# Patient Record
Sex: FEMALE | Race: WHITE | NOT HISPANIC OR LATINO | Employment: PART TIME | URBAN - METROPOLITAN AREA
[De-identification: names, ages, dates, MRNs, and addresses within clinical notes are randomized per-mention and may not be internally consistent; named-entity substitution may affect disease eponyms.]

---

## 2022-12-29 ENCOUNTER — TRANSFERRED RECORDS (OUTPATIENT)
Dept: MULTI SPECIALTY CLINIC | Facility: CLINIC | Age: 32
End: 2022-12-29

## 2022-12-29 LAB
HEP C HIM: NORMAL
HIV 1&2 EXT: NORMAL

## 2023-03-26 ENCOUNTER — TRANSFERRED RECORDS (OUTPATIENT)
Dept: MULTI SPECIALTY CLINIC | Facility: CLINIC | Age: 33
End: 2023-03-26

## 2023-03-26 LAB — PAP SMEAR - HIM PATIENT REPORTED: NORMAL

## 2023-12-21 ENCOUNTER — MEDICAL CORRESPONDENCE (OUTPATIENT)
Dept: HEALTH INFORMATION MANAGEMENT | Facility: CLINIC | Age: 33
End: 2023-12-21
Payer: COMMERCIAL

## 2023-12-31 ENCOUNTER — HEALTH MAINTENANCE LETTER (OUTPATIENT)
Age: 33
End: 2023-12-31

## 2024-01-22 ENCOUNTER — PATIENT OUTREACH (OUTPATIENT)
Dept: ONCOLOGY | Facility: CLINIC | Age: 34
End: 2024-01-22

## 2024-01-22 ENCOUNTER — OFFICE VISIT (OUTPATIENT)
Dept: FAMILY MEDICINE | Facility: CLINIC | Age: 34
End: 2024-01-22
Payer: COMMERCIAL

## 2024-01-22 VITALS
DIASTOLIC BLOOD PRESSURE: 75 MMHG | TEMPERATURE: 97.9 F | RESPIRATION RATE: 16 BRPM | WEIGHT: 237.25 LBS | OXYGEN SATURATION: 97 % | SYSTOLIC BLOOD PRESSURE: 113 MMHG | HEART RATE: 81 BPM | BODY MASS INDEX: 37.24 KG/M2 | HEIGHT: 67 IN

## 2024-01-22 DIAGNOSIS — K21.9 GASTROESOPHAGEAL REFLUX DISEASE, UNSPECIFIED WHETHER ESOPHAGITIS PRESENT: ICD-10-CM

## 2024-01-22 DIAGNOSIS — L65.9 HAIR LOSS: ICD-10-CM

## 2024-01-22 DIAGNOSIS — Z87.59 HISTORY OF PRE-ECLAMPSIA: ICD-10-CM

## 2024-01-22 DIAGNOSIS — Z86.32 HISTORY OF GESTATIONAL DIABETES MELLITUS (GDM): ICD-10-CM

## 2024-01-22 DIAGNOSIS — E66.01 CLASS 2 SEVERE OBESITY DUE TO EXCESS CALORIES WITH SERIOUS COMORBIDITY AND BODY MASS INDEX (BMI) OF 36.0 TO 36.9 IN ADULT (H): ICD-10-CM

## 2024-01-22 DIAGNOSIS — E66.812 CLASS 2 SEVERE OBESITY DUE TO EXCESS CALORIES WITH SERIOUS COMORBIDITY AND BODY MASS INDEX (BMI) OF 36.0 TO 36.9 IN ADULT (H): ICD-10-CM

## 2024-01-22 DIAGNOSIS — Z13.220 SCREENING FOR LIPID DISORDERS: ICD-10-CM

## 2024-01-22 DIAGNOSIS — N83.8 DIMINISHED OVARIAN RESERVE: ICD-10-CM

## 2024-01-22 DIAGNOSIS — Z00.00 ROUTINE GENERAL MEDICAL EXAMINATION AT A HEALTH CARE FACILITY: Primary | ICD-10-CM

## 2024-01-22 DIAGNOSIS — Z80.3 FAMILY HISTORY OF MALIGNANT NEOPLASM OF BREAST: ICD-10-CM

## 2024-01-22 DIAGNOSIS — F33.0 MAJOR DEPRESSIVE DISORDER, RECURRENT EPISODE, MILD (H): ICD-10-CM

## 2024-01-22 LAB
CHOLEST SERPL-MCNC: 212 MG/DL
ERYTHROCYTE [DISTWIDTH] IN BLOOD BY AUTOMATED COUNT: 13.9 % (ref 10–15)
FASTING STATUS PATIENT QL REPORTED: NO
HCT VFR BLD AUTO: 40.3 % (ref 35–47)
HDLC SERPL-MCNC: 44 MG/DL
HGB BLD-MCNC: 13.3 G/DL (ref 11.7–15.7)
HOLD SPECIMEN: NORMAL
LDLC SERPL CALC-MCNC: 117 MG/DL
MCH RBC QN AUTO: 28.9 PG (ref 26.5–33)
MCHC RBC AUTO-ENTMCNC: 33 G/DL (ref 31.5–36.5)
MCV RBC AUTO: 88 FL (ref 78–100)
NONHDLC SERPL-MCNC: 168 MG/DL
PLATELET # BLD AUTO: 255 10E3/UL (ref 150–450)
RBC # BLD AUTO: 4.6 10E6/UL (ref 3.8–5.2)
TRIGL SERPL-MCNC: 256 MG/DL
TSH SERPL DL<=0.005 MIU/L-ACNC: 1.81 UIU/ML (ref 0.3–4.2)
WBC # BLD AUTO: 6.3 10E3/UL (ref 4–11)

## 2024-01-22 PROCEDURE — 84443 ASSAY THYROID STIM HORMONE: CPT | Performed by: FAMILY MEDICINE

## 2024-01-22 PROCEDURE — 80061 LIPID PANEL: CPT | Performed by: FAMILY MEDICINE

## 2024-01-22 PROCEDURE — 85027 COMPLETE CBC AUTOMATED: CPT | Performed by: FAMILY MEDICINE

## 2024-01-22 PROCEDURE — 36415 COLL VENOUS BLD VENIPUNCTURE: CPT | Performed by: FAMILY MEDICINE

## 2024-01-22 PROCEDURE — 99385 PREV VISIT NEW AGE 18-39: CPT | Performed by: FAMILY MEDICINE

## 2024-01-22 PROCEDURE — 99213 OFFICE O/P EST LOW 20 MIN: CPT | Mod: 25 | Performed by: FAMILY MEDICINE

## 2024-01-22 RX ORDER — FAMOTIDINE 20 MG/1
20 TABLET, FILM COATED ORAL 2 TIMES DAILY PRN
Qty: 60 TABLET | Refills: 1 | Status: SHIPPED | OUTPATIENT
Start: 2024-01-22 | End: 2024-04-09

## 2024-01-22 RX ORDER — FLUOXETINE 40 MG/1
80 CAPSULE ORAL DAILY
Qty: 180 CAPSULE | Refills: 3 | Status: SHIPPED | OUTPATIENT
Start: 2024-01-22 | End: 2024-05-16

## 2024-01-22 ASSESSMENT — ENCOUNTER SYMPTOMS
HEMATURIA: 0
HEMATOCHEZIA: 0
SHORTNESS OF BREATH: 0
NERVOUS/ANXIOUS: 0
ABDOMINAL PAIN: 0
COUGH: 0
CONSTIPATION: 0
ARTHRALGIAS: 0
EYE PAIN: 0
PALPITATIONS: 0
SORE THROAT: 0
FEVER: 0
DIARRHEA: 0
DIZZINESS: 0
NAUSEA: 0
DYSURIA: 0
PARESTHESIAS: 0
CHILLS: 0
WEAKNESS: 0
MYALGIAS: 0
HEADACHES: 0
FREQUENCY: 0
HEARTBURN: 1
JOINT SWELLING: 0
BREAST MASS: 0

## 2024-01-22 NOTE — PROGRESS NOTES
Writer received referral to Cancer Risk Management/Genetic Counseling.    Referred for: 32yo female with maternal aunt with breast cancer and paternal grandfather with colon cancer, need for cancer syndrome testing?      Referral reviewed for appropriate plan, and sent to New Patient Scheduling ([phone]) for completion.    Veronica De Luna, RN, BSN  Oncology New Patient Nurse Navigator   [facility]  [phone]

## 2024-01-22 NOTE — Clinical Note
Please abstract the following data from this visit with this patient into the appropriate field in Epic:  Tests that can be patient reported without a hard copy:    Other Tests found in the patient's chart through Chart Review/Care Everywhere:  HIV negative 12/29/2022 [facility] HCV negatie 12/29/2022 [facility]   Note to Abstraction: If this section is blank, no results were found via Chart Review/Care Everywhere.

## 2024-03-26 ENCOUNTER — TELEPHONE (OUTPATIENT)
Dept: FAMILY MEDICINE | Facility: CLINIC | Age: 34
End: 2024-03-26
Payer: COMMERCIAL

## 2024-03-27 ENCOUNTER — MYC MEDICAL ADVICE (OUTPATIENT)
Dept: FAMILY MEDICINE | Facility: CLINIC | Age: 34
End: 2024-03-27
Payer: COMMERCIAL

## 2024-04-09 ENCOUNTER — OFFICE VISIT (OUTPATIENT)
Dept: FAMILY MEDICINE | Facility: CLINIC | Age: 34
End: 2024-04-09
Payer: COMMERCIAL

## 2024-04-09 VITALS
BODY MASS INDEX: 37.83 KG/M2 | TEMPERATURE: 98 F | DIASTOLIC BLOOD PRESSURE: 80 MMHG | HEART RATE: 85 BPM | WEIGHT: 241 LBS | OXYGEN SATURATION: 97 % | SYSTOLIC BLOOD PRESSURE: 116 MMHG | RESPIRATION RATE: 14 BRPM | HEIGHT: 67 IN

## 2024-04-09 DIAGNOSIS — L98.9 SKIN LESION: ICD-10-CM

## 2024-04-09 DIAGNOSIS — K21.9 GASTROESOPHAGEAL REFLUX DISEASE, UNSPECIFIED WHETHER ESOPHAGITIS PRESENT: ICD-10-CM

## 2024-04-09 DIAGNOSIS — F33.1 MAJOR DEPRESSIVE DISORDER, RECURRENT EPISODE, MODERATE (H): Primary | ICD-10-CM

## 2024-04-09 PROCEDURE — 99213 OFFICE O/P EST LOW 20 MIN: CPT | Performed by: FAMILY MEDICINE

## 2024-04-09 RX ORDER — FAMOTIDINE 20 MG/1
20 TABLET, FILM COATED ORAL 2 TIMES DAILY PRN
Qty: 60 TABLET | Refills: 1 | Status: SHIPPED | OUTPATIENT
Start: 2024-04-09

## 2024-04-09 RX ORDER — ARIPIPRAZOLE 5 MG/1
TABLET ORAL
Qty: 49 TABLET | Refills: 0 | Status: SHIPPED | OUTPATIENT
Start: 2024-04-09 | End: 2024-05-13

## 2024-04-09 ASSESSMENT — ANXIETY QUESTIONNAIRES
7. FEELING AFRAID AS IF SOMETHING AWFUL MIGHT HAPPEN: NOT AT ALL
4. TROUBLE RELAXING: SEVERAL DAYS
1. FEELING NERVOUS, ANXIOUS, OR ON EDGE: SEVERAL DAYS
3. WORRYING TOO MUCH ABOUT DIFFERENT THINGS: SEVERAL DAYS
GAD7 TOTAL SCORE: 7
2. NOT BEING ABLE TO STOP OR CONTROL WORRYING: SEVERAL DAYS
7. FEELING AFRAID AS IF SOMETHING AWFUL MIGHT HAPPEN: NOT AT ALL
8. IF YOU CHECKED OFF ANY PROBLEMS, HOW DIFFICULT HAVE THESE MADE IT FOR YOU TO DO YOUR WORK, TAKE CARE OF THINGS AT HOME, OR GET ALONG WITH OTHER PEOPLE?: SOMEWHAT DIFFICULT
IF YOU CHECKED OFF ANY PROBLEMS ON THIS QUESTIONNAIRE, HOW DIFFICULT HAVE THESE PROBLEMS MADE IT FOR YOU TO DO YOUR WORK, TAKE CARE OF THINGS AT HOME, OR GET ALONG WITH OTHER PEOPLE: SOMEWHAT DIFFICULT
GAD7 TOTAL SCORE: 7
GAD7 TOTAL SCORE: 7
6. BECOMING EASILY ANNOYED OR IRRITABLE: MORE THAN HALF THE DAYS
5. BEING SO RESTLESS THAT IT IS HARD TO SIT STILL: SEVERAL DAYS

## 2024-04-09 ASSESSMENT — PATIENT HEALTH QUESTIONNAIRE - PHQ9
SUM OF ALL RESPONSES TO PHQ QUESTIONS 1-9: 10
SUM OF ALL RESPONSES TO PHQ QUESTIONS 1-9: 10
10. IF YOU CHECKED OFF ANY PROBLEMS, HOW DIFFICULT HAVE THESE PROBLEMS MADE IT FOR YOU TO DO YOUR WORK, TAKE CARE OF THINGS AT HOME, OR GET ALONG WITH OTHER PEOPLE: SOMEWHAT DIFFICULT

## 2024-04-09 NOTE — PROGRESS NOTES
Assessment & Plan     Major depressive disorder, recurrent episode, moderate (H)  She is feeling symptoms are not well controlled on fluoxetine 40mg daily. No SI. She is in therapy. We discussed options including trying a different SNRI (she has been on many SSRIs) or another class of medications entirely or augmenting with aripiprazole or bupropion (not favored due to anxiety). We decided to proceed with aripiprazole. Will also refer to psychiatry. She will send a Check-Cap message in a few weeks to check in on her symptoms and determine if we need to increase dose.  - ARIPiprazole (ABILIFY) 5 MG tablet; Take 1 tablet (5 mg) by mouth daily for 7 days, THEN 2 tablets (10 mg) daily for 21 days.  - Adult mental health referral    Skin lesion  May be acquired nevus - it is bothersome to her in its location.  - Adult Dermatology  Referral; Future    Gastroesophageal reflux disease, unspecified whether esophagitis present  She did not pick this up before but would like to try.  - famotidine (PEPCID) 20 MG tablet; Take 1 tablet (20 mg) by mouth 2 times daily as needed (heartburn)                  Tila Murdock is a 33 year old, presenting for the following health issues:  Depression and Skin Tags        4/9/2024     2:41 PM   Additional Questions   Roomed by maikel lopez     History of Present Illness       Mental Health Follow-up:  Patient presents to follow-up on Depression & Anxiety.Patient's depression since last visit has been:  Worse  The patient is not having other symptoms associated with depression.  Patient's anxiety since last visit has been:  Medium  The patient is not having other symptoms associated with anxiety.  Any significant life events: grief or loss  Patient is not feeling anxious or having panic attacks.  Patient has no concerns about alcohol or drug use.      Has been taking medications for depression for 11y  Paxil - was amazing, then tried again and not so great  Escitalopram - was a bit  of a blur, was on this for the longest  Zoloft - got a lot of weight gain; short period of time before starting to try to get pregnant  Now on prozac - started on prozac before trying to get pregnant; has been on it for over two years  Doesn't feel like it is keeping her even anymore, feels like she is taking a big downturn towards depressive mode  Very tired all the time - not conducive to being a mom  Has therapy  Every once in a while gets anxiety - applied to new job which threw into tailspin; not a lot of anxiety about being a mom    PATIENT HEALTH QUESTIONNAIRE-9 (PHQ - 9)    Over the last 2 weeks, how often have you been bothered by any of the following problems?    1. Little interest or pleasure in doing things -  Several days   2. Feeling down, depressed, or hopeless -  Several days   3. Trouble falling or staying asleep, or sleeping too much - More than half the days   4. Feeling tired or having little energy -  More than half the days   5. Poor appetite or overeating -  More than half the days   6. Feeling bad about yourself - or that you are a failure or have let yourself or your family down -  Several days   7. Trouble concentrating on things, such as reading the newspaper or watching television - Several days   8. Moving or speaking so slowly that other people could have noticed? Or the opposite - being so fidgety or restless that you have been moving around a lot more than usual Not at all   9. Thoughts that you would be better off dead or of hurting  yourself in some way Not at all   Total Score: 10     If you checked off any problems, how difficult have these problems made it for you to do your work, take care of things at home, or get along with other people?      Developed by Billy Leger, Linette Bush, Albert Muñoz and colleagues, with an educational cuauhtemoc from [employer] No permission required to reproduce, translate, display or distribute. permission required to reproduce,  "translate, display or distribute.    GAD7 score: 7                Review of Systems  Constitutional, HEENT, cardiovascular, pulmonary, gi and gu systems are negative, except as otherwise noted.      Objective    /80   Pulse 85   Temp 98  F (36.7  C) (Oral)   Resp 14   Ht 1.7 m (5' 6.93\")   Wt 109.3 kg (241 lb)   LMP 04/01/2024 (Exact Date)   SpO2 97%   BMI 37.83 kg/m    Body mass index is 37.83 kg/m .  Physical Exam   General: well-appearing, no acute distress  HENT: normocephalic, atraumatic  Eyes: no conjunctival injection, no scleral icterus, EOMI  Neck: normal ROM, supple  Cardiovascular: regular rate  Pulmonary: normal effort  Abdomen: non-distended  Musculoskeletal: grossly normal ROM, no deformity  Extremities: no lower extremity edema  Psych: mood/affect normal   Skin: 2mm white smooth papule near lateral right eye            Signed Electronically by: Yesy Mays MD    "

## 2024-04-30 ENCOUNTER — PRE VISIT (OUTPATIENT)
Dept: PSYCHIATRY | Facility: CLINIC | Age: 34
End: 2024-04-30
Payer: COMMERCIAL

## 2024-04-30 NOTE — TELEPHONE ENCOUNTER
PSYCHIATRY CLINIC PHONE INTAKE     SERVICES REQUESTED / INTERESTED IN          Other:  Consult    Presenting Problem and Brief History                              What would you like to be seen for? (brief description):  Pt was diagnosed 10 years ago. She currently takes prozac 80mg. She was prescribed Abilify but she hesitant to start on it before talking to a psychiatrist. Her doctor submitted a consult because the prozac isn't working anymore and the pt would like to connect with a psychiatrist to discuss alternative medications. Hard to stay asleep. Over the past 10 years, she's been put on other medications. She was placed on prozac because it was safe while she was pregnant. She had her baby in June of 2023.     Have you received a mental health diagnosis? Yes   Which one (s): Depression and anxiety  Is there any history of developmental delay?  No   Are you currently seeing a mental health provider?  Yes            Who / month last seen:  Therapist - Nelly Romero at [facility]. Psychiatrist was in [place]  Do you have mental health records elsewhere?  Yes  Will you sign a release so we can obtain them?  Yes    Have you ever been hospitalized for psychiatric reasons?  No  Describe:  NA    Do you have current thoughts of self-harm?  No    Do you currently have thoughts of harming others?  No    Do you have any safety concerns? No   If yes to these, offer to reach out to a  for follow up.      Substance Use History     Do you have any history of alcohol  or other substance use?  Yes Describe:  Alcohol, abuse. Stopped drinking before she got pregnant.   Have you ever received treatment for this?  No    Describe:  No     Social History     Who is the patient's a guardian?  No    Name / number: NA  Have you had an ACT team in last 12 months?  No  Describe: NA   OK to leave a detailed voicemail?  Yes    Would you be interested in learning more about research opportunities for which you or your  child may qualify? We can connect you with a team member for more information.   unknown   If yes, send an inbasket message to Giselle Sarabia    Medical/ Surgical History                                   Patient Active Problem List   Diagnosis    History of gestational diabetes mellitus (GDM)    History of pre-eclampsia    Diminished ovarian reserve    Family history of malignant neoplasm of breast    Gastroesophageal reflux disease, unspecified whether esophagitis present    Major depressive disorder, recurrent episode, moderate (H)    Class 2 severe obesity due to excess calories with serious comorbidity in adult (H)          Medications             Have you taken >3 psychiatric medications in your past?  No  Do you currently take 5 or more medications, including prescriptions, supplements, and other over the counter products?  No    If YES to at least one of these questions:   As part of your evaluation in our clinic, we have specially trained pharmacists as part of your care team. Your provider would like for you to meet with one of our pharmacists to review your current and past medications, ensure your med list is up to date, and queue up any questions or concerns you have about medications. They will review all of your medications, not just for mental health, to help ensure you know what you re taking and that everything is working together.     Please schedule patient in [facility] PSY[facility]RY (Larissa Ogden or Yamile Desai) for 60m MTM in any green space as virtual (video), telephone, or in person (designated in person days per Epic templates).  -Appt notes can say  Psych eval on xx/xx   -Route telephone encounter to the pharmacist who will be seeing the patient.  If patient has questions about insurance coverage or billing, please still schedule the visit and refer them to call the [facility] coordinators at [phone].    Current Outpatient Medications   Medication Sig Dispense Refill    ARIPiprazole  (ABILIFY) 5 MG tablet Take 1 tablet (5 mg) by mouth daily for 7 days, THEN 2 tablets (10 mg) daily for 21 days. 49 tablet 0    famotidine (PEPCID) 20 MG tablet Take 1 tablet (20 mg) by mouth 2 times daily as needed (heartburn) 60 tablet 1    FLUoxetine (PROZAC) 40 MG capsule Take 2 capsules (80 mg) by mouth daily 180 capsule 3         DISPOSITION      4/30/24 Intake complete. Scheduled for MTM on 5/13/24 at 9am. Scheduled for [facility] Med Consult with  on 5/29/24 at 2:30pm    Dana Bush, Lead Complex

## 2024-05-13 ENCOUNTER — VIRTUAL VISIT (OUTPATIENT)
Dept: PHARMACY | Facility: CLINIC | Age: 34
End: 2024-05-13
Payer: COMMERCIAL

## 2024-05-13 DIAGNOSIS — F41.1 GAD (GENERALIZED ANXIETY DISORDER): ICD-10-CM

## 2024-05-13 DIAGNOSIS — F33.0 MAJOR DEPRESSIVE DISORDER, RECURRENT EPISODE, MILD (H): ICD-10-CM

## 2024-05-13 DIAGNOSIS — F33.1 MODERATE EPISODE OF RECURRENT MAJOR DEPRESSIVE DISORDER (H): Primary | ICD-10-CM

## 2024-05-13 PROCEDURE — 99207 PR NO CHARGE LOS: CPT | Mod: 95 | Performed by: PHARMACIST

## 2024-05-13 RX ORDER — CALCIUM CARBONATE 500 MG/1
1 TABLET, CHEWABLE ORAL DAILY PRN
COMMUNITY

## 2024-05-13 RX ORDER — TRIAMCINOLONE ACETONIDE 1 MG/G
CREAM TOPICAL DAILY PRN
COMMUNITY
Start: 2024-04-17

## 2024-05-13 NOTE — Clinical Note
Just an fyi that we elected to start cross titrating fluoxetine to duloxetine in advance of your visit on 5/29 due patient really needing some improvement. See note for details and let me know what questions you have. Yamile

## 2024-05-13 NOTE — PATIENT INSTRUCTIONS
"Recommendations from today's MTM visit:                                                    Today we reviewed what your medicines are for, how to know if they are working, that your medicines are safe and how to make your medicine regimen as easy as possible.      -Consider switching fluoxetine to duloxetine--I will talk to your doctor    Follow-up: MTM as needed  Psychiatry intake 5/29    It was great speaking with you today.  I value your experience and would be very thankful for your time in providing feedback in our clinic survey. In the next few days, you may receive an email or text message from [employer] with a link to a survey related to your  clinical pharmacist.\"     To schedule another MTM appointment, please call the clinic directly or you may call the MTM scheduling line at [phone] or toll-free at [phone].     My Clinical Pharmacist's contact information:                                                      Please feel free to contact me with any questions or concerns you have.      Yamile Desai, PharmD  Medication Therapy Management Pharmacist  [facility]      "

## 2024-05-13 NOTE — Clinical Note
Hello, I met with this patient today to review medications prior to her upcoming psychiatry intake.  She has not yet started the aripiprazole from your visit and I wonder what you think about switching fluoxetine to duloxetine instead.  She wanted to get something started before the psychiatry visit if possible. Please let me know your thoughts and I can follow-up with her. Thank you, Yamile Desai, PharmD Medication Therapy Management Pharmacist [facility]

## 2024-05-13 NOTE — PROGRESS NOTES
Medication Therapy Management (MTM) Encounter    ASSESSMENT:                            Medication Adherence/Access: No issues identified    Depression/anxiety:   She has been on multiple SSRIs and current med is not providing much benefit, so considered switching to a different class of medications. Would prefer to avoid medications that may contribute to weight gain or cause more sedation. Discussed switching to SNRI and reviewed potential for causing increased sweating as she is already experiencing this, though she may not have this side effect.  Reducing alcohol intake may also help with depression management and may help improve quality of sleep, though this was not discussed in detail today.    PLAN:                            -Consider switching fluoxetine to duloxetine    Follow-up: MTM as needed  Psychiatry intake 5/29    SUBJECTIVE/OBJECTIVE:                          Collette Boss is a 33 year old female called for an initial visit. She was referred to me from psychiatry intake.      Reason for visit: med review; [facility] intake.  10 months post partum    Allergies/ADRs: Reviewed in chart  Past Medical History: Reviewed in chart  Tobacco: She reports that she has never smoked. She has never been exposed to tobacco smoke. She has never used smokeless tobacco.  Alcohol: 1-2 drinks of wine/beer per night  Caffeine: 4-5 cups of coffee per day  Marijuana: none    Medication Adherence/Access: sets an alarm, which works pretty well    Has appointment to explore perimenopause    Depression/anxiety:   -fluoxetine 80mg daily    PCP recently prescribed aripiprazole to add onto fluoxetine, though patient has been unsure and hasn't yet started this med.    Was diagnosed with depression and anxiety about 10 years ago.  Baby was born 6/2023  Baby is sleeping through the night, but Collette is still not sleeping well. Tired a lot, doesn't feel like herself, mood changes, low motivation to do much. Feels that she is giving all her  "energy to making it through the day and feeling \"really depleted.\" Anxiety has been less of a factor recently.   Falls asleep ok, but having trouble staying asleep. She may wake up due to being hot/sweating, then feels wide awake. Not having nightmares. She may wake around for a while, go get a drink or snack then try going back to bed, which is sometimes helpful.     She had a year of fertility treatments, then sertraline was switched to fluoxetine when she became pregnant. She doesn't feel like fluoxetine is working well. Describes a difficult time  things during the past two years throughout fertility treatments, stressful and traumatic pregnancy, moving from [place] when baby was 3 months old (10/2023), and ongoing post partum mood changes.    Paroxetine was stopped after doing better during a 4 month period of more difficult symptoms. Was off of medication for about a year before restarting meds around 2014/2015.    Past Medication Trials    Medications Max dose Dates/Duration Discontinuation Reason   paroxetine \"Low dose\" 2013x 4 mo Felt better after 4 months         escitalopram 40mg? [id number] Lost efficacy   sertraline 50mg? [id number] Weight gain   Fluoxetine  80mg 2022-current         ----------------    I spent 40 minutes with this patient today. A copy of the visit note was provided to the patient's provider(s).    A summary of these recommendations was sent via clinic portal.    Yamile Desai, PharmD  Medication Therapy Management Pharmacist  [facility]      Telemedicine Visit Details  Type of service:  Telephone visit  Start Time:  9:00am  End Time:  9:40am     Medication Therapy Recommendations  Major depressive disorder, recurrent episode, moderate (H)    Current Medication: FLUoxetine (PROZAC) 40 MG capsule   Rationale: More effective medication available - Ineffective medication - Effectiveness   Recommendation: Change Medication - consider " switching to duloxetine   Status: Contact Provider - Awaiting Response

## 2024-05-16 RX ORDER — DULOXETIN HYDROCHLORIDE 30 MG/1
30 CAPSULE, DELAYED RELEASE ORAL DAILY
Qty: 30 CAPSULE | Refills: 1 | Status: SHIPPED | OUTPATIENT
Start: 2024-05-16 | End: 2024-06-07

## 2024-05-16 RX ORDER — FLUOXETINE 40 MG/1
40 CAPSULE ORAL DAILY
COMMUNITY
End: 2024-06-07

## 2024-05-16 NOTE — PROGRESS NOTES
Discussed with PCP, Dr. Mays, who agreed with cross titrating fluoxetine to duloxetine.   Zuora message sent to patient and Rxs sent.    Plan:  reduce fluoxetine from 80mg to 40mg x 1 week, then reduce to 20mg daily and start duloxetine 30mg daily. She will then see psych a week later, who could stop fluoxetine and increase duloxetine if appropriate.     Yamile Desai, PharmD  Medication Therapy Management Pharmacist  [facility]

## 2024-05-24 DIAGNOSIS — R23.2 HOT FLASHES: Primary | ICD-10-CM

## 2024-05-26 ENCOUNTER — LAB (OUTPATIENT)
Dept: LAB | Facility: CLINIC | Age: 34
End: 2024-05-26
Payer: COMMERCIAL

## 2024-05-26 DIAGNOSIS — R23.2 HOT FLASHES: ICD-10-CM

## 2024-05-26 LAB
ESTRADIOL SERPL-MCNC: 69 PG/ML
FSH SERPL IRP2-ACNC: 4.7 MIU/ML
HCG INTACT+B SERPL-ACNC: <1 MIU/ML
LH SERPL-ACNC: 4.9 MIU/ML
PROLACTIN SERPL 3RD IS-MCNC: 9 NG/ML (ref 5–23)
TSH SERPL DL<=0.005 MIU/L-ACNC: 2 UIU/ML (ref 0.3–4.2)

## 2024-05-26 PROCEDURE — 84146 ASSAY OF PROLACTIN: CPT

## 2024-05-26 PROCEDURE — 36415 COLL VENOUS BLD VENIPUNCTURE: CPT

## 2024-05-26 PROCEDURE — 82670 ASSAY OF TOTAL ESTRADIOL: CPT

## 2024-05-26 PROCEDURE — 84443 ASSAY THYROID STIM HORMONE: CPT

## 2024-05-26 PROCEDURE — 84702 CHORIONIC GONADOTROPIN TEST: CPT

## 2024-05-26 PROCEDURE — 83002 ASSAY OF GONADOTROPIN (LH): CPT

## 2024-05-26 PROCEDURE — 83001 ASSAY OF GONADOTROPIN (FSH): CPT

## 2024-05-28 NOTE — PROGRESS NOTES
Virtual Visit Details    Type of service:  Video Visit     Originating Location (pt. Location): Home    Distant Location (provider location):  Off-site  Platform used for Video Visit: Anuj    Consent was obtained from the patient to use an AI documentation tool in the creation of this note.         [facility]  NEW PATIENT DIAGNOSTIC ASSESSMENT     Collette Boss is a  34 year old currently 11 months  postpartum, referred by Yesy Loera-Kati for evaluation of mental health..    Partner/Support: -Conrado, (supportive)  Feeding Method: Stopped at 6 months. Currently eating formula and mixed solids.      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      Today, Collette reports sadness, low motivation, low energy, and hopelessness. In addition, she reports the following vasomotor symptoms that have been occurring for the past two weeks-hot flushes and night sweats. She has tried some medications in the past and would like a more thoughtful approach on the next steps. As a result, she held off starting Cymbalta. Due to her symptoms, having a medication with some noradrenaline action like Cymbalta or Wellbutrin might be helpful. We can also consider trying Paxil again due to its benefit for Collette in the past and its use for vasomotor symptoms. No SI, HI, or acute safety mental health concern.      Safety  "Risk-Collette did not appear to be an imminent safety risk to self or others.    Psychotropic Drug Interactions:  [PSYCHCLINICDDI]  ADDITIVE SEROTONERGIC: Prozac, Duloxetine  Management: limit med redundancy    [place] was not checked today: not using controlled substances       Plan     1) Medications:   - Continue Prozac taper  -Yet to start Cymbalta 30mg daily    2) Psychotherapy: Continue individual therapy    3) Next due:  Labs- Routine monitoring is not indicated for current psychotropic medication regimen   EKG- Routine monitoring is not indicated for current psychotropic medication regimen   Rating scales-  PHQ-9    4) Referrals: none    5) Other: none    6) Follow-up: Return to clinic in 2 weeks       Chief Concern                                                                                                    \" I would like some help with my medication regimen \"      Pertinent Background                                                   [most recent eval 05/29/24]     Collette first experienced mental health symptoms of anxiety as an undergraduate student. Had a lot of test anxiety with IBS. Started to experience depression 10 years ago. In that period, she lived abroad and drank heavily. She used Paxil which was helpful for some time. When [place] to [place], was placed on Lexapro which was helpful but got into a terrible  work situation and started drinking heavily again.She was placed on a different medication she doesn't remember.       Pertinent items include: SIB, hx of depression, alcohol use as a way to cope        Subjective     -Collette reports that she's always felt depressed with periods of relief with some past medication trials. Describes her symptoms as sadness, low motivation, hopelessness, low energy. Feels burnt out and exhausted. Like she's struggling to keep up. Her work is going down to ome day/week and she's meant to look for a new job but has been unmotivated.    -In the past two " months has been  experiencing hot flushes, extreme fatigue, night sweats, irregular periods and thinks it might be perimenopausal symptoms. Her cousin has Hashimoto's thyroid disease so she's interested in an extensive workup.    -Reports her pregnancy was so traumatic that she did not have time to process it. She is being hit with some of that recent trauma and the infertility journey that was really traumatic too. She has found motherhood to be extremely hard. She loves her daughter dearly, but she did not anticipate the loss of herself as much as has been.      Sleep: Montserrat has been sleeping through the night but Collette has trouble initiating and sustaining sleep since Montserrat was born. Thinks stress gets in the way of falling asleep and sometimes when she wakes up, she's sweaty and needs time to get back to sleep. Has been taking Unisom for sleep.    Structure: Her  works in [place] for 3days/week and she has to solo parent which has felt horrible. The first step was getting Montserrat into  which made things better. Jonathan feels like she's able to breathe and can do things she enjoys.    Bonding/Attachment/Parenting: Reports that she has found motherhood to be harder than anticipated. Loves her daughter so much and has found it hard.    Child development/Milestones: On track and ahead with her milestones.      Recent Substance Use  Alcohol: Nightly-1 wine or beer    Reproductive Plans: Not on contraception. Isn't planning to have another child.     Substance Use History                                                               Past Use- History of heavy drinking as a means of escape from depressive symptoms. This happened on and off. Thinks 10 years ago, she had a program. During her pregnancy, she was sober. She does not report any marijuana, ecstasy, or cigarette smoking.   Treatment [#, most recent]- Received treatment in therapy    Medical Consequences [withdrawal, sz etc]- None   HIV/Hepatitis- None     Legal Consequences-  None          Psychiatric History   Suicidal ideation- None   Suicide Attempt:   #- 0   Most Recent- N/A  SIB- Cutting in 6th grade- 1or 2 times   Flavia- None    Psychosis- None    Violence/Aggression-   Trauma History- Mother threatened to leave when she was younger because she felt she couldn't handle Collette. Collette had an unhappy period of time from second grade-4th grade when she was acting out. Parents  some years ago.  Psych Hosp- None   ECT- None   Outpatient Programs [ DBT, Day Treatment, Eating Disorder Tx etc]- Individual therapy for alcohol absue      Mood & Anxiety Disorder (PMAD) History   Hx of  mood or anxiety disorder: Worsened depression while pregnant. Dose of antidepressant was increased to 80mg  Hx of  psychosis: N.A  Hx premenstrual mood/anxiety problems: Experienced dips in mood leading up to her period.   Hx mood symptoms while taking hormonal birth control: On birth control in college. Doesn't remember  Hx of infertility: 5 rounds of IVF, IUI  Hx of traumatic birth: Had a traumatic pregnancy and is now just processing everything.  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 Supported by her income and 's        Children- Roxanne ChildressMontserrat)       Living situation- Lives with her , daughter and pets.      Legal- None  Early history/Education- Born and raised in [place]. An only child. Struggled to have friends because of her upbringing. Did well in school and highest education level is a masters.  Social support-   for about 11 years. Recently moved from [place] . Moved because of 's job.  In-laws that live here. She finds them supportive. Most of her friends are still in [place].  Cultural influences/Impact- None       Feels safe at home- Yes  Family History-  Depression and AUD: Mother      Psychiatric Medication Trials       Drug /  Start Date Dose (mg) Helpful Taken during a  period? Adverse Effects   DC Reason / Date   Prozac        Paxil        Lexapro  Y but wore off      Cymbalta                    Medical / Surgical History                                                                                                                     Patient Active Problem List   Diagnosis    History of gestational diabetes mellitus (GDM)    History of pre-eclampsia    Diminished ovarian reserve    Family history of malignant neoplasm of breast    Gastroesophageal reflux disease, unspecified whether esophagitis present    Major depressive disorder, recurrent episode, moderate (H)    Class 2 severe obesity due to excess calories with serious comorbidity in adult (H)       Past Surgical History:   Procedure Laterality Date     SECTION      OTHER SURGICAL HISTORY      Uterine polyp removal, hysteroscopy        Medical Review of Systems                                                                                       2,10   none in addition to that documented above  Allergy                                Patient has no known allergies.  Current Medications                                                                                                         Current Outpatient Medications   Medication Sig Dispense Refill    calcium carbonate (TUMS) 500 MG chewable tablet Take 1 chew tab by mouth daily as needed for heartburn      famotidine (PEPCID) 20 MG tablet Take 1 tablet (20 mg) by mouth 2 times daily as needed (heartburn) 60 tablet 1    FLUoxetine (PROZAC) 40 MG capsule Take 40 mg by  mouth daily X 1 week, then 20mg daily x 1 week, then stop      triamcinolone (KENALOG) 0.1 % external cream Apply topically daily as needed for irritation      DULoxetine (CYMBALTA) 30 MG capsule Take 1 capsule (30 mg) by mouth daily (Patient not taking: Reported on 5/29/2024) 30 capsule 1     Vitals                                                                                             LMP 04/01/2024 (Exact Date)    Mental Status Exam                                                                            9, 14 cog gs   Alertness: alert  and oriented  Appearance: well groomed  Behavior/Demeanor: cooperative, with good  eye contact   Speech: regular rate and rhythm  Language:  Fluent in English  Psychomotor: normal or unremarkable  Mood: depressed  Affect: restricted; was congruent to mood; was congruent to content  Thought Process/Associations:  linear and logical  Thought Content:  Reports none;  Denies suicidal ideation and violent ideation  Perception:  Reports none;  Denies auditory hallucinations and visual hallucinations  Insight: good  Judgment: good  Cognition: (6) does  appear grossly intact; formal cognitive testing was not done  Gait and Station:  N/A ([facility]     Labs and Data         4/9/2024     2:39 PM 5/29/2024     8:17 AM   PHQ   PHQ-9 Total Score 10 10   Q9: Thoughts of better off dead/self-harm past 2 weeks Not at all Not at all       Answers submitted by the patient for this visit:  Patient Health Questionnaire (Submitted on 5/29/2024)  If you checked off any problems, how difficult have these problems made it for you to do your work, take care of things at home, or get along with other people?: Somewhat difficult  PHQ9 TOTAL SCORE: 10        5/29/2024     8:26 AM   PROMIS-10 Total Score w/o Sub Scores   PROMIS TOTAL - SUBSCORES 20         5/29/2024     8:26 AM   CAGE-AID Total Score   Total Score 2   Total Score MyChart 2 (A total score of 2 or greater is considered clinically  significant)         4/9/2024     2:39 PM 5/29/2024     8:17 AM   PHQ-9 SCORE   PHQ-9 Total Score MyChart 10 (Moderate depression) 10 (Moderate depression)   PHQ-9 Total Score 10 10         4/9/2024     2:41 PM   ALLI-7 SCORE   Total Score 7 (mild anxiety)   Total Score 7       Liver/Kidney Function, TSH Metabolic Blood counts   No lab results found.  Recent Labs   Lab Test 05/26/24  1027   TSH 2.00    Recent Labs   Lab Test 01/22/24  0818   CHOL 212*   TRIG 256*   *   HDL 44*     No lab results found.  No lab results found. Recent Labs   Lab Test 01/22/24  0818   WBC 6.3   HGB 13.3   HCT 40.3   MCV 88              ECG N/A QTc = N/Ams      PROVIDER: Tolulope Oluwadamilola Odebunmi, MD    Billing statement based on time: On the date of service, I spent a total of 91 minutes reviewing the EMR, meeting with the patient, coordinating care, and documenting in the EMR.        Psychiatry Individual Psychotherapy Note   Psychotherapy start time - na PM  Psychotherapy end time - na PM  Date treatment plan last reviewed with patient - 05/29/24  Subjective: This supportive psychotherapy session addressed issues related to goals of therapy and current psychosocial stressors. Patient's reaction: Action in the context of mental status appropriate for ambulatory setting.    Interactive complexity indicated? No  Plan: RTC in timeframe noted above  Psychotherapy services during this visit included myself and the patient.   Treatment Plan      SYMPTOMS; PROBLEMS   MEASURABLE GOALS;    FUNCTIONAL IMPROVEMENT / GAINS INTERVENTIONS DISCHARGE CRITERIA   Depression: depressed mood, anhedonia, and low energy   reduce depressive symptoms Supportive / psychodynamic marked symptom improvement

## 2024-05-29 ENCOUNTER — VIRTUAL VISIT (OUTPATIENT)
Dept: PSYCHIATRY | Facility: CLINIC | Age: 34
End: 2024-05-29
Attending: STUDENT IN AN ORGANIZED HEALTH CARE EDUCATION/TRAINING PROGRAM
Payer: COMMERCIAL

## 2024-05-29 DIAGNOSIS — R23.2 HOT FLASHES: Primary | ICD-10-CM

## 2024-05-29 DIAGNOSIS — F33.1 MAJOR DEPRESSIVE DISORDER, RECURRENT EPISODE, MODERATE (H): ICD-10-CM

## 2024-05-29 PROCEDURE — 99417 PROLNG OP E/M EACH 15 MIN: CPT | Mod: 95 | Performed by: STUDENT IN AN ORGANIZED HEALTH CARE EDUCATION/TRAINING PROGRAM

## 2024-05-29 PROCEDURE — 99205 OFFICE O/P NEW HI 60 MIN: CPT | Mod: 95 | Performed by: STUDENT IN AN ORGANIZED HEALTH CARE EDUCATION/TRAINING PROGRAM

## 2024-05-29 ASSESSMENT — PAIN SCALES - GENERAL: PAINLEVEL: NO PAIN (0)

## 2024-05-29 NOTE — NURSING NOTE
Is the patient currently in the state of [place]? YES    Visit mode:VIDEO    If the visit is dropped, the patient can be reconnected by: VIDEO VISIT: Text to cell phone:   Telephone Information:   Mobile [phone]       Will anyone else be joining the visit? NO  (If patient encounters technical issues they should call [phone]    How would you like to obtain your AVS? MyChart    Are changes needed to the allergy or medication list? No-pt just hasn't started the Duloxetine yet.    Are refills needed on medications prescribed by this physician? NO    Reason for visit: Consult    Monik FRIEDMAN

## 2024-05-29 NOTE — PATIENT INSTRUCTIONS
Thank you for coming to the [facility] Women's Wellbeing Program.     Treatment Plan    Medications:  - Continue Prozac taper  -Yet to start Cymbalta 30mg daily    Therapy:  Continue individual therapy    RTC:   in 2 weeks      Lab Testing:  If you had lab testing today and your results are reassuring or normal they will be mailed to you or sent through [employer] within 7 days. If the lab tests need quick action we will call you with the results. The phone number we will call with results is # [phone]. If this is not the best number please call our clinic and change the number.     Medication Refills:  If you need any refills please call your pharmacy and they will contact us. Our fax number for refills is [phone].   Three business days of notice are needed for general medication refill requests.   Five business days of notice are needed for controlled substance refill requests.   If you need to change to a different pharmacy, please contact the new pharmacy directly. The new pharmacy will help you get your medications transferred.     Contact Us:  Please call [phone] during business hours (8-5:00 M-F).   If you have medication related questions after clinic hours, or on the weekend, please call [phone].     Financial Assistance [phone]   Medical Records [phone]       **For crisis resources, please see the information at the end of this document**     To find additional local resources, refer to Postpartum Support International (PSI). Available at: http://www.postpartum.net/get-help/locations/united-states/  -[facility]'s Mental [facility] at: www.womensmentalhealth.org is a good resource for information about psychiatric medication in pregnancy/lactation  -Mother To Baby A service of the nonprofit Organization of [employer]at[employer] (SANDY), provides evidence based information online and in printable handouts to provide patients and providers regarding  "medications and other exposures during pregnancy and lactation Available at: https://mothertobaby.org/fact-sheets-parent/.  -The 4th Trimester Project - Expert written resources and information for mothers and families. Available at: https://Jiankongbao/  -Consider using \"The Pregnancy and Postpartum Anxiety Workbook,\" by Catarina Benz PhD and Stef Spence PsyD.    Postpartum Planning Tools:  Maternal Wellbeing Plan from [facility]: https://www.health.[facility].[place]/people/womeninfants/pmad/pmadsfs.html    4th Trimester Postpartum plan: [email or web address]://[email or web address]/mypostpartumplan    Tips for partners:  http://www.postpartum.net/family/tips-for-postpartum-dads-and-partners/        MENTAL HEALTH CRISIS RESOURCES:  For a emergency help, please call 911 or go to the nearest Emergency Department.     Emergency Walk-In Options:   EmPATH Unit @ [facility]): [phone] - Specialized mental health emergency area designed to be calming  [facility] ([place]): [phone]  [facility] ([place]): [phone]  [facility]): [phone]    [facility] Crisis Information:   [place]: [phone]  [place]: [phone]  Vin (JT) - Adult: [phone]     Child: [phone]  Toni - Adult: [phone]     Child: [phone]  [place]: [phone]  List of all [facility] resources:   https://[place].gov/dhs/people-we-serve/adults/health-care/mental-health/resources/crisis-contacts.jsp    National Crisis Information:   Crisis Text Line: Text  [place]  to 719071  Suicide & Crisis Lifeline: 988  National Suicide Prevention Lifeline: 6-889-634-TALK (1-569.169.8857)       For online chat options, visit https://suicidepreventionlifeline.org/chat/  Poison Control Center: 1-960.111.8291  Trans Lifeline: 2-202-411-1891 - Hotline for transgender people of all ages  The Sherwin Project: 9-995-212-4385 - Hotline for LGBT youth     For Non-Emergency Support:   Fast " Tracker: Mental Health & Substance Use Disorder Resources -   https://www.[employer].org/

## 2024-05-30 ENCOUNTER — OFFICE VISIT (OUTPATIENT)
Dept: OBGYN | Facility: CLINIC | Age: 34
End: 2024-05-30
Attending: OBSTETRICS & GYNECOLOGY
Payer: COMMERCIAL

## 2024-05-30 VITALS
BODY MASS INDEX: 37.04 KG/M2 | DIASTOLIC BLOOD PRESSURE: 82 MMHG | SYSTOLIC BLOOD PRESSURE: 119 MMHG | WEIGHT: 236 LBS | HEART RATE: 80 BPM | HEIGHT: 67 IN

## 2024-05-30 DIAGNOSIS — N93.9 ABNORMAL UTERINE BLEEDING (AUB): Primary | ICD-10-CM

## 2024-05-30 DIAGNOSIS — N95.1 PERIMENOPAUSE: ICD-10-CM

## 2024-05-30 PROCEDURE — 99213 OFFICE O/P EST LOW 20 MIN: CPT | Performed by: OBSTETRICS & GYNECOLOGY

## 2024-05-30 PROCEDURE — 99203 OFFICE O/P NEW LOW 30 MIN: CPT | Performed by: OBSTETRICS & GYNECOLOGY

## 2024-05-30 RX ORDER — GABAPENTIN 100 MG/1
100-300 CAPSULE ORAL
Qty: 90 CAPSULE | Refills: 1 | Status: SHIPPED | OUTPATIENT
Start: 2024-05-30

## 2024-05-30 RX ORDER — DESOGESTREL AND ETHINYL ESTRADIOL 0.15-0.03
1 KIT ORAL DAILY
Qty: 90 TABLET | Refills: 3 | Status: SHIPPED | OUTPATIENT
Start: 2024-05-30 | End: 2024-07-10

## 2024-05-30 ASSESSMENT — ANXIETY QUESTIONNAIRES
GAD7 TOTAL SCORE: 4
5. BEING SO RESTLESS THAT IT IS HARD TO SIT STILL: NOT AT ALL
8. IF YOU CHECKED OFF ANY PROBLEMS, HOW DIFFICULT HAVE THESE MADE IT FOR YOU TO DO YOUR WORK, TAKE CARE OF THINGS AT HOME, OR GET ALONG WITH OTHER PEOPLE?: SOMEWHAT DIFFICULT
IF YOU CHECKED OFF ANY PROBLEMS ON THIS QUESTIONNAIRE, HOW DIFFICULT HAVE THESE PROBLEMS MADE IT FOR YOU TO DO YOUR WORK, TAKE CARE OF THINGS AT HOME, OR GET ALONG WITH OTHER PEOPLE: SOMEWHAT DIFFICULT
2. NOT BEING ABLE TO STOP OR CONTROL WORRYING: SEVERAL DAYS
7. FEELING AFRAID AS IF SOMETHING AWFUL MIGHT HAPPEN: NOT AT ALL
3. WORRYING TOO MUCH ABOUT DIFFERENT THINGS: SEVERAL DAYS
1. FEELING NERVOUS, ANXIOUS, OR ON EDGE: SEVERAL DAYS
6. BECOMING EASILY ANNOYED OR IRRITABLE: NOT AT ALL
4. TROUBLE RELAXING: SEVERAL DAYS
7. FEELING AFRAID AS IF SOMETHING AWFUL MIGHT HAPPEN: NOT AT ALL
GAD7 TOTAL SCORE: 4

## 2024-05-30 ASSESSMENT — PAIN SCALES - GENERAL: PAINLEVEL: NO PAIN (0)

## 2024-05-30 NOTE — PATIENT INSTRUCTIONS
Start with birth control pill daily- no need to have off week    Try neurontin (gabapentin) at night for sleep. Can take up to 3 tablets at night for sleep    Magnesium glycinate- take as directed on bottle at bedtime. Ok to take with gabapentin    [place] [facility]- Phone: [phone]    Consider acupuncture- kaela acupuncture        Thank you for trusting us with your care!     If you need to contact us for questions about:  Symptoms, Scheduling & Medical Questions; Non-urgent (2-3 day response) Kyte message, Urgent (needing response today) [phone] (if after 3:30pm next day response)   Prescriptions: Please call your Pharmacy   Billing: [employer] [phone] or  Physicians:[phone]

## 2024-05-30 NOTE — LETTER
5/30/2024       RE: Collette Boss  [address]  [place] [address]     Dear Colleague,    Thank you for referring your patient, Collette Boss, to the [facility] [employer] at [facility]. Please see a copy of my visit note below.    CC/HPI:   Collette Boss is a 34 year old female  here to establish care and discuss concerns of possible hormonal abnormalities. She has been struggling with hot flashes, severe fatigue, some night sweats as well as decreased libido and ongoing depression. She was recently seen by her PCP who ordered labs with a normal FSH, estradiol, TSH, prolactin, CBC. She is understandably frustrated by lack of clues into her symptoms.     She is about 1 year postpartum. 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 She had initial triplet pregnancy which was reduced to madera, had rescue cerclage and preeclampsia. Had CS at 36 wks. She  for a few months after delivery. Her baby is sleeping through the night, yet the amount of fatigue she has is debilitating. She underwent a sleep study with no evidence of ANNE MARIE. She does have night sweats, but overall they are not as prominent as her hot flashes. She has been on prozac for several years and previously did not have any night sweats.     She also has very low/absent libido for many years. She feels like even before her infertility low libido was a problem. She denies pain with intercourse.       She wonders if symptoms are due to possible thyroid disease or menopause related. She had very low AMH and carries a fragile x premutation.     HISTORIES:  Patient Active Problem List   Diagnosis    History of gestational diabetes mellitus (GDM)    History of pre-eclampsia    Diminished ovarian reserve    Family history of malignant neoplasm of breast    Gastroesophageal reflux disease, unspecified whether  esophagitis present    Major depressive disorder, recurrent episode, moderate (H)    Class 2 severe obesity due to excess calories with serious comorbidity in adult (H)     Past Medical History:   Diagnosis Date    History of gestational diabetes mellitus (GDM) 2024    History of pre-eclampsia 2024    MDD (major depressive disorder)      Past Surgical History:   Procedure Laterality Date     SECTION      OTHER SURGICAL HISTORY      Uterine polyp removal, hysteroscopy     Current Outpatient Medications   Medication Sig Dispense Refill    calcium carbonate (TUMS) 500 MG chewable tablet Take 1 chew tab by mouth daily as needed for heartburn      desogestrel-ethinyl estradiol (APRI) 0.15-30 MG-MCG tablet Take 1 tablet by mouth daily 90 tablet 3    famotidine (PEPCID) 20 MG tablet Take 1 tablet (20 mg) by mouth 2 times daily as needed (heartburn) 60 tablet 1    FLUoxetine (PROZAC) 40 MG capsule Take 40 mg by mouth daily X 1 week, then 20mg daily x 1 week, then stop      gabapentin (NEURONTIN) 100 MG capsule Take 1-3 capsules (100-300 mg) by mouth nightly as needed for other (sleep) 90 capsule 1    triamcinolone (KENALOG) 0.1 % external cream Apply topically daily as needed for irritation      DULoxetine (CYMBALTA) 30 MG capsule Take 1 capsule (30 mg) by mouth daily (Patient not taking: Reported on 2024) 30 capsule 1     No Known Allergies  Social History     Socioeconomic History    Marital status:      Spouse name: Not on file    Number of children: Not on file    Years of education: Not on file    Highest education level: Not on file   Occupational History    Not on file   Tobacco Use    Smoking status: Never     Passive exposure: Never    Smokeless tobacco: Never   Vaping Use    Vaping status: Never Used   Substance and Sexual Activity    Alcohol use: Yes     Comment: 1-2 beers per night    Drug use: Never    Sexual activity: Not Currently     Partners: Male     Birth  control/protection: Condom     Comment: not postpartum   Other Topics Concern    Not on file   Social History Narrative    Not on file     Social Determinants of Health     Financial Resource Strain: Low Risk  (1/22/2024)    Financial Resource Strain     Within the past 12 months, have you or your family members you live with been unable to get utilities (heat, electricity) when it was really needed?: No   Food Insecurity: Low Risk  (1/22/2024)    Food Insecurity     Within the past 12 months, did you worry that your food would run out before you got money to buy more?: No     Within the past 12 months, did the food you bought just not last and you didn t have money to get more?: No   Transportation Needs: Low Risk  (1/22/2024)    Transportation Needs     Within the past 12 months, has lack of transportation kept you from medical appointments, getting your medicines, non-medical meetings or appointments, work, or from getting things that you need?: No   Physical Activity: Not on file   Stress: Not on file   Social Connections: Not on file   Interpersonal Safety: Low Risk  (1/22/2024)    Interpersonal Safety     Do you feel physically and emotionally safe where you currently live?: Yes     Within the past 12 months, have you been hit, slapped, kicked or otherwise physically hurt by someone?: No     Within the past 12 months, have you been humiliated or emotionally abused in other ways by your partner or ex-partner?: No   Housing Stability: Low Risk  (1/22/2024)    Housing Stability     Do you have housing? : Yes     Are you worried about losing your housing?: No     Family History   Problem Relation Age of Onset    Depression Mother     Other - See Comments Mother         Problems after surgery    Prostate Problems Father     No Known Problems Maternal Grandmother     Coronary Artery Disease Early Onset Maternal Grandfather 40 - 49    No Known Problems Paternal Grandmother     Colon Cancer Paternal Grandfather 60 -  "70    Breast Cancer Maternal Aunt 50 - 60    No Known Problems Daughter     Cerebrovascular Disease No family hx of     Prostate Cancer No family hx of     Ovarian Cancer No family hx of     Diabetes No family hx of     Hypertension No family hx of     Hyperlipidemia No family hx of           Gyn Hx:   Patient's last menstrual period was 05/30/2024 (exact date).      Review Of Systems:  CONSTITUTIONAL:fatigue and sweats  EYES: NEGATIVE for vision changes   RESP: NEGATIVE for significant cough or SOB  CV: NEGATIVE for chest pain, palpitations   GI: NEGATIVE for nausea, abdominal pain, heartburn, or change in bowel habits  MUSCULOSKELETAL: NEGATIVE for significant arthralgias or myalgia  NEURO: NEGATIVE for weakness, dizziness or paresthesias or headache  ENDOCRINE: Hot flashes, night sweats    EXAM:  /82   Pulse 80   Ht 1.7 m (5' 6.93\")   Wt 107 kg (236 lb)   LMP 05/30/2024 (Exact Date)   BMI 37.04 kg/m    Body mass index is 37.04 kg/m .      ASSESSMENT/PLAN  34 year old here with concerns about possible perimenopause, fatigue and low libido    Reviewed previous results with Collette. Discussed perimenopause as likely contributing to her symptoms, she would be at increased risk for premature ovarian insufficiency with fragile x premutation. Reviewed limited correlation with low AMH and menopause timing, though it would fit picture with significantly low levels. Discussed symptomatic management of her symptoms as starting place to see if improves. Also discussed low libido and multifactorial aspects to libido. Reviewed relationship of selective serotonin reuptake inhibitor with decreased libido- is considering switch to wellbutrin which has not been shown to negatively impact libido. Mood and fertility treatments are often also factors with low libido- though this has been present prior to fertility treatments.     - OCP rx  - Gabapentin at night sheyla sleep.   - Info given on [facility] for " management of low libido.   - Discussed complementary management of hot flashes/night sweats/sleep including use of acupuncture and magnesium glycinate.   - Follow up in 3 months       Rizwana Srivastava MD      Answers submitted by the patient for this visit:  ALLI-7 (Submitted on 5/30/2024)  ALLI 7 TOTAL SCORE: 4

## 2024-06-03 DIAGNOSIS — Z83.49 FAMILY HISTORY OF HASHIMOTO THYROIDITIS: Primary | ICD-10-CM

## 2024-06-03 DIAGNOSIS — L68.0 HIRSUTISM: ICD-10-CM

## 2024-06-04 NOTE — PROGRESS NOTES
CC/HPI:   Collette Boss is a 34 year old female  here to establish care and discuss concerns of possible hormonal abnormalities. She has been struggling with hot flashes, severe fatigue, some night sweats as well as decreased libido and ongoing depression. She was recently seen by her PCP who ordered labs with a normal FSH, estradiol, TSH, prolactin, CBC. She is understandably frustrated by lack of clues into her symptoms.     She is about 1 year postpartum. 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 She had initial triplet pregnancy which was reduced to madera, had rescue cerclage and preeclampsia. Had CS at 36 wks. She  for a few months after delivery. Her baby is sleeping through the night, yet the amount of fatigue she has is debilitating. She underwent a sleep study with no evidence of ANNE MARIE. She does have night sweats, but overall they are not as prominent as her hot flashes. She has been on prozac for several years and previously did not have any night sweats.     She also has very low/absent libido for many years. She feels like even before her infertility low libido was a problem. She denies pain with intercourse.       She wonders if symptoms are due to possible thyroid disease or menopause related. She had very low AMH and carries a fragile x premutation.     HISTORIES:  Patient Active Problem List   Diagnosis    History of gestational diabetes mellitus (GDM)    History of pre-eclampsia    Diminished ovarian reserve    Family history of malignant neoplasm of breast    Gastroesophageal reflux disease, unspecified whether esophagitis present    Major depressive disorder, recurrent episode, moderate (H)    Class 2 severe obesity due to excess calories with serious comorbidity in adult (H)     Past Medical History:   Diagnosis Date    History of gestational diabetes mellitus (GDM) 01/22/2024    History of pre-eclampsia 01/22/2024     MDD (major depressive disorder)      Past Surgical History:   Procedure Laterality Date     SECTION      OTHER SURGICAL HISTORY      Uterine polyp removal, hysteroscopy     Current Outpatient Medications   Medication Sig Dispense Refill    calcium carbonate (TUMS) 500 MG chewable tablet Take 1 chew tab by mouth daily as needed for heartburn      desogestrel-ethinyl estradiol (APRI) 0.15-30 MG-MCG tablet Take 1 tablet by mouth daily 90 tablet 3    famotidine (PEPCID) 20 MG tablet Take 1 tablet (20 mg) by mouth 2 times daily as needed (heartburn) 60 tablet 1    FLUoxetine (PROZAC) 40 MG capsule Take 40 mg by mouth daily X 1 week, then 20mg daily x 1 week, then stop      gabapentin (NEURONTIN) 100 MG capsule Take 1-3 capsules (100-300 mg) by mouth nightly as needed for other (sleep) 90 capsule 1    triamcinolone (KENALOG) 0.1 % external cream Apply topically daily as needed for irritation      DULoxetine (CYMBALTA) 30 MG capsule Take 1 capsule (30 mg) by mouth daily (Patient not taking: Reported on 2024) 30 capsule 1     No Known Allergies  Social History     Socioeconomic History    Marital status:      Spouse name: Not on file    Number of children: Not on file    Years of education: Not on file    Highest education level: Not on file   Occupational History    Not on file   Tobacco Use    Smoking status: Never     Passive exposure: Never    Smokeless tobacco: Never   Vaping Use    Vaping status: Never Used   Substance and Sexual Activity    Alcohol use: Yes     Comment: 1-2 beers per night    Drug use: Never    Sexual activity: Not Currently     Partners: Male     Birth control/protection: Condom     Comment: not postpartum   Other Topics Concern    Not on file   Social History Narrative    Not on file     Social Determinants of Health     Financial Resource Strain: Low Risk  (2024)    Financial Resource Strain     Within the past 12 months, have you or your family members you live with  been unable to get utilities (heat, electricity) when it was really needed?: No   Food Insecurity: Low Risk  (1/22/2024)    Food Insecurity     Within the past 12 months, did you worry that your food would run out before you got money to buy more?: No     Within the past 12 months, did the food you bought just not last and you didn t have money to get more?: No   Transportation Needs: Low Risk  (1/22/2024)    Transportation Needs     Within the past 12 months, has lack of transportation kept you from medical appointments, getting your medicines, non-medical meetings or appointments, work, or from getting things that you need?: No   Physical Activity: Not on file   Stress: Not on file   Social Connections: Not on file   Interpersonal Safety: Low Risk  (1/22/2024)    Interpersonal Safety     Do you feel physically and emotionally safe where you currently live?: Yes     Within the past 12 months, have you been hit, slapped, kicked or otherwise physically hurt by someone?: No     Within the past 12 months, have you been humiliated or emotionally abused in other ways by your partner or ex-partner?: No   Housing Stability: Low Risk  (1/22/2024)    Housing Stability     Do you have housing? : Yes     Are you worried about losing your housing?: No     Family History   Problem Relation Age of Onset    Depression Mother     Other - See Comments Mother         Problems after surgery    Prostate Problems Father     No Known Problems Maternal Grandmother     Coronary Artery Disease Early Onset Maternal Grandfather 40 - 49    No Known Problems Paternal Grandmother     Colon Cancer Paternal Grandfather 60 - 70    Breast Cancer Maternal Aunt 50 - 60    No Known Problems Daughter     Cerebrovascular Disease No family hx of     Prostate Cancer No family hx of     Ovarian Cancer No family hx of     Diabetes No family hx of     Hypertension No family hx of     Hyperlipidemia No family hx of           Gyn Hx:   Patient's last menstrual  "period was 05/30/2024 (exact date).      Review Of Systems:  CONSTITUTIONAL:fatigue and sweats  EYES: NEGATIVE for vision changes   RESP: NEGATIVE for significant cough or SOB  CV: NEGATIVE for chest pain, palpitations   GI: NEGATIVE for nausea, abdominal pain, heartburn, or change in bowel habits  MUSCULOSKELETAL: NEGATIVE for significant arthralgias or myalgia  NEURO: NEGATIVE for weakness, dizziness or paresthesias or headache  ENDOCRINE: Hot flashes, night sweats    EXAM:  /82   Pulse 80   Ht 1.7 m (5' 6.93\")   Wt 107 kg (236 lb)   LMP 05/30/2024 (Exact Date)   BMI 37.04 kg/m    Body mass index is 37.04 kg/m .      ASSESSMENT/PLAN  34 year old here with concerns about possible perimenopause, fatigue and low libido    Reviewed previous results with Collette. Discussed perimenopause as likely contributing to her symptoms, she would be at increased risk for premature ovarian insufficiency with fragile x premutation. Reviewed limited correlation with low AMH and menopause timing, though it would fit picture with significantly low levels. Discussed symptomatic management of her symptoms as starting place to see if improves. Also discussed low libido and multifactorial aspects to libido. Reviewed relationship of selective serotonin reuptake inhibitor with decreased libido- is considering switch to wellbutrin which has not been shown to negatively impact libido. Mood and fertility treatments are often also factors with low libido- though this has been present prior to fertility treatments.     - OCP rx  - Gabapentin at night sheyla sleep.   - Info given on [facility] for management of low libido.   - Discussed complementary management of hot flashes/night sweats/sleep including use of acupuncture and magnesium glycinate.   - Follow up in 3 months       Rizwana Srivastava MD      Answers submitted by the patient for this visit:  ALLI-7 (Submitted on 5/30/2024)  ALLI 7 TOTAL SCORE: 4  "

## 2024-06-06 NOTE — PROGRESS NOTES
Virtual Visit Details    Type of service:  Video Visit     Originating Location (pt. Location): Home    Distant Location (provider location):  On-site  Platform used for Video Visit: [facility]  MEDICAL PROGRESS NOTE     Collette Boss is a  34 year old currently in late postpartum, referred by Yesy Loera-Kati for evaluation of mental health..    Partner/Support: -Conrado, (supportive)  Feeding Method: Stopped breastfeeding at 6 months. Currently using formula and mixed solids.      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    Today, Collette reports feeling more hopeful since her OBGYN appointment. Yet to start the Prozac taper and out of all the options dicussed today (Cymbalta, Effexor, Wellbutrin) prefers Wellbutrin. Wellbutrin's norepinephrine and dopamine activity will be helpful for the subtype of symptoms she's experiencing. We discussed some side effects to be aware of and the daytime recommended timing. No SI, HI, or acute safety mental health concern.      Safety Risk-Collette did not appear to be an imminent safety risk to self or others.    Psychotropic Drug Interactions:  [PSYCHCLINICDDI]  ADDITIVE SEROTONERGIC: Prozac, Duloxetine  FLUoxetine may enhance the neuroexcitatory and/or seizure-potentiating effect of BuPROPion. BuPROPion may increase the serum concentration of FLUoxetine.  CNS Depressants(Gabapentin) may enhance the adverse/toxic effect of Selective Serotonin  Reuptake Inhibitors. Specifically, the risk of psychomotor impairment may be enhanced.     Management: limit med redundancy    [place] was not [place] today: not using controlled substances       Plan     1) Medications:   - Start Prozac taper: take 40mg x 14days, then 20mg for 14 days, and stop  - Start Wellbutrin XR 150mg daily    Prescribed by OB  -Continue Gabapentin 100-300mg at bedtime PRN    2) Psychotherapy: Continue individual therapy    3) Next due:  Labs- Routine monitoring is not indicated for current psychotropic medication regimen   EKG- Routine monitoring is not indicated for current psychotropic medication regimen   Rating scales-  PHQ-9    4) Referrals: none    5) Other: none    6) Follow-up: Return to clinic in 4 weeks         Pertinent Background                                                   [most recent eval 05/29/24]     Collette first experienced mental health symptoms of anxiety as an undergraduate student. Had a lot of test anxiety with IBS. Started to experience depression 10 years ago. In that period, she lived abroad and drank heavily. She used Paxil which was helpful for some time. When she [place] to the [place], was placed on Lexapro which was helpful but got into a terrible  work situation and started drinking heavily again.She was placed on a different medication she doesn't remember.       Pertinent items include: SIB, hx of depression, alcohol use as a way to cope        Interim History     Since last visit:  -Saw her OBGYN provider to discuss menopause and feels more hopeful about things. Was prescribed Gabapentin to help at night with sleep.  -Spent the visit discussing medication options and choosing between SSRIs vs SNRIs.     Sleep: Montserrat has been sleeping through the night but Collette has trouble initiating and sustaining sleep since Montserrat was born. Thinks stress gets in the way of falling asleep and sometimes when she wakes up, she's sweaty and needs time to get back to sleep. Has  been taking Unisom for sleep.    Structure: Her  works in [place] for 3days/week and she has to solo parent which has felt horrible. The first step was getting Montserrat into  which made things better. Jonathan feels like she's able to breathe and can do things she enjoys.    Bonding/Attachment/Parenting: Reports that she has found motherhood to be harder than anticipated. Loves her daughter so much and has found it hard.    Child development/Milestones: On track and ahead with her milestones.      Recent Substance Use  Alcohol: Nightly-1 wine or beer    Reproductive Plans: Not on contraception. Isn't planning to have another child.           Important Summary Points & Treatment Events   24: Established care  24: Started Prozac taper. Start Wellbutrin. Continue PRN Gabapentin started by OB.       Mood & Anxiety Disorder (PMAD) History   Hx of  mood or anxiety disorder: Worsened depression while pregnant. Dose of antidepressant was increased to 80mg  Hx of  psychosis: N.A  Hx premenstrual mood/anxiety problems: Experienced dips in mood leading up to her period.   Hx mood symptoms while taking hormonal birth control: On birth control in college. Doesn't remember  Hx of infertility: 5 rounds of IVF, IUI  Hx of traumatic birth: Had a traumatic pregnancy and is now just processing everything.  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 Supported by her income and 's        Children- Roxanne (Montserrat)       Living situation- Lives with her , daughter and pets.       Legal- None  Early history/Education- Born and raised in [place]. An only child. Struggled to have friends because of her upbringing. Did well in school and highest education level is a masters.  Social support-  for about 11 years. Recently moved from [place] . Moved because of 's job.  In-laws that live here. She finds them supportive. Most of her friends are still in [place].  Cultural influences/Impact- None       Feels safe at home- Yes  Family History-  Depression and AUD: Mother      Psychiatric Medication Trials       Drug /  Start Date Dose (mg) Helpful Taken during a  period? Adverse Effects   DC Reason / Date   Prozac        Paxil        Lexapro  Y but wore off      Cymbalta                    Medical / Surgical History                                                                                                                     Patient Active Problem List   Diagnosis    History of gestational diabetes mellitus (GDM)    History of pre-eclampsia    Diminished ovarian reserve    Family history of malignant neoplasm of breast    Gastroesophageal reflux disease, unspecified whether esophagitis present    Major depressive disorder, recurrent episode, moderate (H)    Class 2 severe obesity due to excess calories with serious comorbidity in adult (H)       Past Surgical History:   Procedure Laterality Date     SECTION      OTHER SURGICAL HISTORY      Uterine polyp removal, hysteroscopy        Medical Review of Systems                                                                                       2,10   none in addition to that documented above  Allergy                                Patient has no known allergies.  Current Medications                                                                                                         Current Outpatient Medications   Medication Sig Dispense Refill    buPROPion (WELLBUTRIN XL) 150 MG 24 hr tablet Take 1 tablet  (150 mg) by mouth every morning 30 tablet 0    calcium carbonate (TUMS) 500 MG chewable tablet Take 1 chew tab by mouth daily as needed for heartburn      famotidine (PEPCID) 20 MG tablet Take 1 tablet (20 mg) by mouth 2 times daily as needed (heartburn) 60 tablet 1    FLUoxetine (PROZAC) 20 MG capsule Take 1 capsule (20 mg) by mouth daily 14 capsule 0    FLUoxetine (PROZAC) 40 MG capsule Take 1 capsule (40 mg) by mouth daily for 14 days 14 capsule 0    gabapentin (NEURONTIN) 100 MG capsule Take 1-3 capsules (100-300 mg) by mouth nightly as needed for other (sleep) 90 capsule 1    triamcinolone (KENALOG) 0.1 % external cream Apply topically daily as needed for irritation      desogestrel-ethinyl estradiol (APRI) 0.15-30 MG-MCG tablet Take 1 tablet by mouth daily 90 tablet 3     Vitals                                                                                             LMP 05/30/2024 (Exact Date)    Mental Status Exam                                                                            9, 14 cog gs   Alertness: alert  and oriented  Appearance: well groomed  Behavior/Demeanor: cooperative, with good  eye contact   Speech: regular rate and rhythm  Language:  Fluent in English  Psychomotor: normal or unremarkable  Mood: depressed  Affect: restricted; was congruent to mood; was congruent to content  Thought Process/Associations:  linear and logical  Thought Content:  Reports none;  Denies suicidal ideation and violent ideation  Perception:  Reports none;  Denies auditory hallucinations and visual hallucinations  Insight: good  Judgment: good  Cognition: (6) does  appear grossly intact; formal cognitive testing was not done  Gait and Station:  N/A ([facility])     Labs and Data         4/9/2024     2:39 PM 5/29/2024     8:17 AM   PHQ   PHQ-9 Total Score 10 10   Q9: Thoughts of better off dead/self-harm past 2 weeks Not at all Not at all             5/29/2024     8:26 AM   PROMIS-10 Total Score w/o Sub Scores    PROMIS TOTAL - SUBSCORES 20         5/29/2024     8:26 AM   CAGE-AID Total Score   Total Score 2   Total Score MyChart 2 (A total score of 2 or greater is considered clinically significant)         4/9/2024     2:39 PM 5/29/2024     8:17 AM   PHQ-9 SCORE   PHQ-9 Total Score MyChart 10 (Moderate depression) 10 (Moderate depression)   PHQ-9 Total Score 10 10         4/9/2024     2:41 PM 5/30/2024     1:27 PM   ALLI-7 SCORE   Total Score 7 (mild anxiety) 4 (minimal anxiety)   Total Score 7 4       Liver/Kidney Function, TSH Metabolic Blood counts   No lab results found.  Recent Labs   Lab Test 05/26/24  1027   TSH 2.00    Recent Labs   Lab Test 01/22/24  0818   CHOL 212*   TRIG 256*   *   HDL 44*     No lab results found.  No lab results found. Recent Labs   Lab Test 01/22/24  0818   WBC 6.3   HGB 13.3   HCT 40.3   MCV 88              ECG N/A QTc = N/Ams      PROVIDER: Tolulope Oluwadamilola Odebunmi, MD        Psychiatry Individual Psychotherapy Note   Psychotherapy start time - na AM  Psychotherapy end time - na AM  Date treatment plan last reviewed with patient - 05/29/24  Subjective: This supportive psychotherapy session addressed issues related to goals of therapy and current psychosocial stressors. Patient's reaction: Action in the context of mental status appropriate for ambulatory setting.    Interactive complexity indicated? No  Plan: RTC in timeframe noted above  Psychotherapy services during this visit included myself and the patient.   Treatment Plan      SYMPTOMS; PROBLEMS   MEASURABLE GOALS;    FUNCTIONAL IMPROVEMENT / GAINS INTERVENTIONS DISCHARGE CRITERIA   Depression: depressed mood, anhedonia, and low energy   reduce depressive symptoms Supportive / psychodynamic marked symptom improvement

## 2024-06-07 ENCOUNTER — LAB (OUTPATIENT)
Dept: LAB | Facility: CLINIC | Age: 34
End: 2024-06-07
Payer: COMMERCIAL

## 2024-06-07 ENCOUNTER — VIRTUAL VISIT (OUTPATIENT)
Dept: PSYCHIATRY | Facility: CLINIC | Age: 34
End: 2024-06-07
Attending: STUDENT IN AN ORGANIZED HEALTH CARE EDUCATION/TRAINING PROGRAM
Payer: COMMERCIAL

## 2024-06-07 DIAGNOSIS — F33.1 MAJOR DEPRESSIVE DISORDER, RECURRENT EPISODE, MODERATE (H): Primary | ICD-10-CM

## 2024-06-07 DIAGNOSIS — L68.0 HIRSUTISM: ICD-10-CM

## 2024-06-07 DIAGNOSIS — Z83.49 FAMILY HISTORY OF HASHIMOTO THYROIDITIS: ICD-10-CM

## 2024-06-07 PROCEDURE — 99214 OFFICE O/P EST MOD 30 MIN: CPT | Mod: 95 | Performed by: STUDENT IN AN ORGANIZED HEALTH CARE EDUCATION/TRAINING PROGRAM

## 2024-06-07 PROCEDURE — 84403 ASSAY OF TOTAL TESTOSTERONE: CPT

## 2024-06-07 PROCEDURE — 86376 MICROSOMAL ANTIBODY EACH: CPT

## 2024-06-07 PROCEDURE — 36415 COLL VENOUS BLD VENIPUNCTURE: CPT

## 2024-06-07 RX ORDER — BUPROPION HYDROCHLORIDE 150 MG/1
150 TABLET ORAL EVERY MORNING
Qty: 30 TABLET | Refills: 0 | Status: SHIPPED | OUTPATIENT
Start: 2024-06-07 | End: 2024-07-02

## 2024-06-07 RX ORDER — FLUOXETINE 40 MG/1
40 CAPSULE ORAL DAILY
Qty: 14 CAPSULE | Refills: 0 | Status: SHIPPED | OUTPATIENT
Start: 2024-06-07 | End: 2024-06-12

## 2024-06-07 ASSESSMENT — PAIN SCALES - GENERAL: PAINLEVEL: NO PAIN (0)

## 2024-06-07 NOTE — NURSING NOTE
Is the patient currently in the state of [place]? YES    Visit mode:VIDEO    If the visit is dropped, the patient can be reconnected by: VIDEO VISIT: Text to cell phone:   Telephone Information:   Mobile [phone]       Will anyone else be joining the visit? NO  (If patient encounters technical issues they should call [phone]    How would you like to obtain your AVS? MyChart    Are changes needed to the allergy or medication list? No    Are refills needed on medications prescribed by this physician? NO    Reason for visit: RECHECK    Hollie FRIEDMAN

## 2024-06-07 NOTE — PATIENT INSTRUCTIONS
Thank you for coming to the [facility].     Treatment Plan    Medications:  - Start Prozac taper: take 40mg x 14days, then 20mg for 14 days, and stop  - Start Wellbutrin XR 150mg daily    Therapy:  Continue individual therapy    RTC:  In 4 weeks      Lab Testing:  If you had lab testing today and your results are reassuring or normal they will be mailed to you or sent through pr2go.com within 7 days. If the lab tests need quick action we will call you with the results. The phone number we will call with results is # [phone]. If this is not the best number please call our clinic and change the number.     Medication Refills:  If you need any refills please call your pharmacy and they will contact us. Our fax number for refills is [phone].   Three business days of notice are needed for general medication refill requests.   Five business days of notice are needed for controlled substance refill requests.   If you need to change to a different pharmacy, please contact the new pharmacy directly. The new pharmacy will help you get your medications transferred.     Contact Us:  Please call [phone] during business hours (8-5:00 M-F).   If you have medication related questions after clinic hours, or on the weekend, please call [phone].     Financial Assistance [phone]   Medical Records [phone]       **For crisis resources, please see the information at the end of this document**     To find additional local resources, refer to Postpartum Support International (PSI). Available at: http://www.postpartum.net/get-help/locations/united-[facility]/  -[facility] at: www.womensmentalhealth.org is a good resource for information about psychiatric medication in pregnancy/lactation  -Mother To Baby A service of the nonprofit Organization of [employer] (SANDY), provides evidence based information online and in printable handouts  "to provide patients and providers regarding medications and other exposures during pregnancy and lactation Available at: https://mothertobaby.org/fact-sheets-parent/.  -The 4th Trimester Project - Expert written resources and information for mothers and families. Available at: https://Rollbar/  -Consider using \"The Pregnancy and Postpartum Anxiety Workbook,\" by Catarina Benz PhD and Stef Spence PsyD.    Postpartum Planning Tools:  Maternal Wellbeing Plan from [facility]: https://www.health.[facility].[place]/people/womeninfants/pmad/pmadsfs.html    4th Trimester Postpartum plan: https://Rollbar/mypostpartumplan    Tips for partners:  http://www.postpartum.net/family/tips-for-postpartum-dads-and-partners/        MENTAL HEALTH CRISIS RESOURCES:  For a emergency help, please call 911 or go to the nearest Emergency Department.     Emergency Walk-In Options:   EmPATH Unit @ [facility]): [phone] - Specialized mental health emergency area designed to be [facility]  [facility] ([place]): [phone]  [facility] Acute Psychiatry Services ([place]): [phone]  [facility]): [phone]    [facility] Crisis Information:   [place]: [phone]  [place]: [phone]  [place]) - Adult: [phone]     Child: [phone]  [place] - Adult: [phone]     Child: [phone]  [place]: [phone]  List of all [facility] resources:   https://[place].gov/dhs/people-we-serve/adults/health-care/mental-health/resources/crisis-contacts.jsp    National Crisis Information:   Crisis Text Line: Text  [place]  to 553080  Suicide & Crisis Lifeline: 988  National Suicide Prevention Lifeline: 6-202-552-TALK (1-356.317.5213)       For online chat options, visit https://suicidepreventionlifeline.org/chat/  Poison Control Center: 1-306.443.9486  Trans Lifeline: 1-316-524-2794 - Hotline for transgender people of all ages  The [employer] Project: 1-143.710.2519 - Hotline for LGBT " youth     For Non-Emergency Support:   Fast Tracker: Mental Health & Substance Use Disorder Resources -   https://www.[place].org/

## 2024-06-11 ENCOUNTER — MYC MEDICAL ADVICE (OUTPATIENT)
Dept: PSYCHIATRY | Facility: CLINIC | Age: 34
End: 2024-06-11
Payer: COMMERCIAL

## 2024-06-11 LAB — THYROPEROXIDASE AB SERPL-ACNC: <10 IU/ML

## 2024-06-11 NOTE — TELEPHONE ENCOUNTER
Odebunmi, Tolulope Oluwadamilola, MD  You5 minutes ago (12:20 PM)     TO  Balaji Ferris,  Thank you for passing this on. Please could you ask Collette to discontinue the Wellbutrin and increase Prozac back to 60mg? I would like to see her before our next appointment in July. I'll ask the team to help us with scheduling.    Best,  Higinio

## 2024-06-11 NOTE — PROGRESS NOTES
Virtual Visit Details    Type of service:  Video Visit     Originating Location (pt. Location): Other Car    Distant Location (provider location):  Off-site  Platform used for Video Visit: [facility]  MEDICAL PROGRESS NOTE     Collette Boss is a  34 year old currently in late postpartum, referred by Yesy Simeon for evaluation of mental health..    Partner/Support: -Conrado, (supportive)  Feeding Method: Stopped breastfeeding at 6 months. Currently using formula and mixed solids.      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    Today, Collette reports very low mood, dizziness, and headaches two days after she started the Prozac taper to 40 mg. She thinks this might have been due to the stressors of having to parent alone that week/weekend. Prozac has a long half-life so withdrawal effects are more unlikely. She hadn't yet started Wellbutrin when she started to experience these symptoms. Collette is feeling more stable currently after seeing her therapist and will have her  as a support this week. She would liek to give Wellbutrin a fair try. We discussed keeping the Prozac taper at 60mg and restarting Wellbutrin tomorrow AM.We discussed keeping the   No SI, HI, or acute safety mental health concern.      Safety Risk-Collette did not appear to be an imminent safety risk to self or others.    Psychotropic Drug Interactions:  [PSYCHCLINICDDI]  ADDITIVE SEROTONERGIC: Prozac,  Duloxetine  FLUoxetine may enhance the neuroexcitatory and/or seizure-potentiating effect of BuPROPion. BuPROPion may increase the serum concentration of FLUoxetine.  CNS Depressants(Gabapentin) may enhance the adverse/toxic effect of Selective Serotonin Reuptake Inhibitors. Specifically, the risk of psychomotor impairment may be enhanced.     Management: limit med redundancy    [place]MP was not checked today: not using controlled substances       Plan     1) Medications:   - Continue Prozac 60mg daily  - Continue Wellbutrin XR 150mg daily    Prescribed by OB  -Continue Gabapentin 100-300mg at bedtime PRN    2) Psychotherapy: Continue individual therapy    3) Next due:  Labs- Routine monitoring is not indicated for current psychotropic medication regimen   EKG- Routine monitoring is not indicated for current psychotropic medication regimen   Rating scales-  PHQ-9    4) Referrals: none    5) Other: none    6) Follow-up: Return to clinic in 4 weeks         Pertinent Background                                                   [most recent eval 05/29/24]     Collette first experienced mental health symptoms of anxiety as an undergraduate student. Had a lot of test anxiety with IBS. Started to experience depression 10 years ago. In that period, she lived abroad and drank heavily. She used Paxil which was helpful for some time. When she [place] to [place], was placed on Lexapro which was helpful but got into a terrible  work situation and started drinking heavily again.She was placed on a different medication she doesn't remember.       Pertinent items include: SIB, hx of depression, alcohol use as a way to cope        Interim History     Since last visit:  -Reports that she experienced low mood, headache, and dizziness two days after she started the Prozac taper. She spent the last week solo parenting and last weekend during the taper was parenting alone. Expresses that Sunday was the lowest she had been. Immediately she  woke up, she was tired and the day felt really dark. Did not have any suicidal or homicidal or infanticde thoughts She hadn't yet started Wellbutrin when these symptoms occurred.     Sleep: Montserrat has been sleeping through the night but Collette has trouble initiating and sustaining sleep since Montserrat was born. Thinks stress gets in the way of falling asleep and sometimes when she wakes up, she's sweaty and needs time to get back to sleep. Has been taking Unisom for sleep.    Structure: Her  works in [place] for 3days/week and she has to solo parent which has felt horrible. The first step was getting Montserrat into  which made things better. Collette feels like she's able to breathe and can do things she enjoys.    Bonding/Attachment/Parenting: Reports that she has found motherhood to be harder than anticipated. Loves her daughter so much and has found it hard.    Child development/Milestones: On track and ahead with her milestones.      Recent Substance Use  Alcohol: Nightly-1 wine or beer    Reproductive Plans: Not on contraception. Isn't planning to have another child.           Important Summary Points & Treatment Events   24: Established care  24: Started Prozac taper. Start Wellbutrin. Continue PRN Gabapentin started by OB.  24: Pause Prozac taper. Continue dose as 60mg.       Mood & Anxiety Disorder (PMAD) History   Hx of  mood or anxiety disorder: Worsened depression while pregnant. Dose of antidepressant was increased to 80mg  Hx of  psychosis: N.A  Hx premenstrual mood/anxiety problems: Experienced dips in mood leading up to her period.   Hx mood symptoms while taking hormonal birth control: On birth control in college. Doesn't remember  Hx of infertility: 5 rounds of IVF, IUI  Hx of traumatic birth: Had a traumatic pregnancy and is now just processing everything.  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 Supported by her income and 's        Children- Roxanne Sutton)       Living situation- Lives with her , daughter and pets.      Legal- None  Early history/Education- Born and raised in [place]. An only child. Struggled to have friends because of her upbringing. Did well in school and highest education level is a masters.  Social support-  for about 11 years. Recently moved from [place] . Moved because of 's job.  In-laws that live here. She finds them supportive. Most of her friends are still in [place].  Cultural influences/Impact- None       Feels safe at home- Yes  Family History-  Depression and AUD: Mother      Psychiatric Medication Trials       Drug /  Start Date Dose (mg) Helpful Taken during a  period? Adverse Effects   DC Reason / Date   Prozac        Paxil        Lexapro  Y but wore off      Cymbalta                    Medical / Surgical History                                                                                                                     Patient Active Problem List   Diagnosis    History of gestational diabetes mellitus (GDM)    History of pre-eclampsia    Diminished ovarian reserve    Family history of malignant neoplasm of breast    Gastroesophageal reflux disease, unspecified whether esophagitis present    Major depressive disorder, recurrent episode, moderate (H)    Class 2 severe obesity due to excess calories with serious comorbidity in adult (H)       Past Surgical History:   Procedure Laterality Date     SECTION      OTHER SURGICAL HISTORY      Uterine polyp removal,  hysteroscopy        Medical Review of Systems                                                                                       2,10   none in addition to that documented above  Allergy                                Patient has no known allergies.  Current Medications                                                                                                         Current Outpatient Medications   Medication Sig Dispense Refill    buPROPion (WELLBUTRIN XL) 150 MG 24 hr tablet Take 1 tablet (150 mg) by mouth every morning 30 tablet 0    calcium carbonate (TUMS) 500 MG chewable tablet Take 1 chew tab by mouth daily as needed for heartburn      famotidine (PEPCID) 20 MG tablet Take 1 tablet (20 mg) by mouth 2 times daily as needed (heartburn) 60 tablet 1    FLUoxetine (PROZAC) 20 MG capsule Take 1 capsule (20 mg) by mouth daily 14 capsule 0    FLUoxetine (PROZAC) 40 MG capsule Take 1 capsule (40 mg) by mouth daily for 14 days 14 capsule 0    gabapentin (NEURONTIN) 100 MG capsule Take 1-3 capsules (100-300 mg) by mouth nightly as needed for other (sleep) 90 capsule 1    triamcinolone (KENALOG) 0.1 % external cream Apply topically daily as needed for irritation      desogestrel-ethinyl estradiol (APRI) 0.15-30 MG-MCG tablet Take 1 tablet by mouth daily 90 tablet 3     Vitals                                                                                             LMP 05/30/2024 (Exact Date)    Mental Status Exam                                                                            9, 14 cog gs   Alertness: alert  and oriented  Appearance: well groomed  Behavior/Demeanor: cooperative, with good  eye contact   Speech: regular rate and rhythm  Language:  Fluent in English  Psychomotor: normal or unremarkable  Mood: depressed  Affect: restricted; was congruent to mood; was congruent to content  Thought Process/Associations:  linear and logical  Thought Content:  Reports none;  Denies suicidal ideation and  violent ideation  Perception:  Reports none;  Denies auditory hallucinations and visual hallucinations  Insight: good  Judgment: good  Cognition: (6) does  appear grossly intact; formal cognitive testing was not done  Gait and Station:  N/A (Telehealth)     Labs and Data         4/9/2024     2:39 PM 5/29/2024     8:17 AM 6/12/2024     2:25 PM   PHQ   PHQ-9 Total Score 10 10 12   Q9: Thoughts of better off dead/self-harm past 2 weeks Not at all Not at all Not at all             5/29/2024     8:26 AM   PROMIS-10 Total Score w/o Sub Scores   PROMIS TOTAL - SUBSCORES 20         5/29/2024     8:26 AM   CAGE-AID Total Score   Total Score 2   Total Score MyChart 2 (A total score of 2 or greater is considered clinically significant)         4/9/2024     2:39 PM 5/29/2024     8:17 AM 6/12/2024     2:25 PM   PHQ-9 SCORE   PHQ-9 Total Score MyChart 10 (Moderate depression) 10 (Moderate depression)    PHQ-9 Total Score 10 10 12         4/9/2024     2:41 PM 5/30/2024     1:27 PM   ALLI-7 SCORE   Total Score 7 (mild anxiety) 4 (minimal anxiety)   Total Score 7 4       Liver/Kidney Function, TSH Metabolic Blood counts   No lab results found.  Recent Labs   Lab Test 05/26/24  1027   TSH 2.00    Recent Labs   Lab Test 01/22/24  0818   CHOL 212*   TRIG 256*   *   HDL 44*     No lab results found.  No lab results found. Recent Labs   Lab Test 01/22/24  0818   WBC 6.3   HGB 13.3   HCT 40.3   MCV 88              ECG N/A QTc = N/Ams      PROVIDER: Tolulope Oluwadamilola Odebunmi, MD    Level of Medical Decision Making:   - At least 1 chronic problem that is not stable  - Engaged in prescription drug management during visit (discussed any medication benefits, side effects, alternatives, etc.)           The longitudinal plan of care for MDD was addressed during this visit. Due to the added complexity in care, I will continue to support Collette in the subsequent management of this condition(s) and with the ongoing continuity of  care of this condition(s).        Psychiatry Individual Psychotherapy Note   Psychotherapy start time - na PM  Psychotherapy end time - na PM  Date treatment plan last reviewed with patient - 05/29/24  Subjective: This supportive psychotherapy session addressed issues related to goals of therapy and current psychosocial stressors. Patient's reaction: Action in the context of mental status appropriate for ambulatory setting.    Interactive complexity indicated? No  Plan: RTC in timeframe noted above  Psychotherapy services during this visit included myself and the patient.   Treatment Plan      SYMPTOMS; PROBLEMS   MEASURABLE GOALS;    FUNCTIONAL IMPROVEMENT / GAINS INTERVENTIONS DISCHARGE CRITERIA   Depression: depressed mood, anhedonia, and low energy   reduce depressive symptoms Supportive / psychodynamic marked symptom improvement

## 2024-06-12 ENCOUNTER — VIRTUAL VISIT (OUTPATIENT)
Dept: PSYCHIATRY | Facility: CLINIC | Age: 34
End: 2024-06-12
Attending: STUDENT IN AN ORGANIZED HEALTH CARE EDUCATION/TRAINING PROGRAM
Payer: COMMERCIAL

## 2024-06-12 DIAGNOSIS — F33.1 MAJOR DEPRESSIVE DISORDER, RECURRENT EPISODE, MODERATE (H): Primary | ICD-10-CM

## 2024-06-12 LAB — TESTOST SERPL-MCNC: 17 NG/DL (ref 8–60)

## 2024-06-12 PROCEDURE — 99214 OFFICE O/P EST MOD 30 MIN: CPT | Mod: 95 | Performed by: STUDENT IN AN ORGANIZED HEALTH CARE EDUCATION/TRAINING PROGRAM

## 2024-06-12 PROCEDURE — G2211 COMPLEX E/M VISIT ADD ON: HCPCS | Mod: 95 | Performed by: STUDENT IN AN ORGANIZED HEALTH CARE EDUCATION/TRAINING PROGRAM

## 2024-06-12 RX ORDER — FLUOXETINE 40 MG/1
40 CAPSULE ORAL DAILY
Qty: 30 CAPSULE | Refills: 1 | Status: SHIPPED | OUTPATIENT
Start: 2024-06-12 | End: 2024-07-23

## 2024-06-12 ASSESSMENT — PAIN SCALES - GENERAL: PAINLEVEL: NO PAIN (0)

## 2024-06-12 ASSESSMENT — PATIENT HEALTH QUESTIONNAIRE - PHQ9: SUM OF ALL RESPONSES TO PHQ QUESTIONS 1-9: 12

## 2024-06-12 NOTE — ADDENDUM NOTE
Addended by: CARLIE JENSEN on: 6/12/2024 03:11 PM     Modules accepted: Orders    
Attending Attestation (For Attendings USE Only)...

## 2024-06-12 NOTE — PATIENT INSTRUCTIONS
Thank you for coming to the [facility] Women's Wellbeing Program.     Treatment Plan    Medications:  - Continue Prozac 60mg daily  - Continue Wellbutrin XR 150mg daily    Therapy:  Continue individual therapy    RTC:  In 4 weeks      Lab Testing:  If you had lab testing today and your results are reassuring or normal they will be mailed to you or sent through [employer] within 7 days. If the lab tests need quick action we will call you with the results. The phone number we will call with results is # [phone]. If this is not the best number please call our clinic and change the number.     Medication Refills:  If you need any refills please call your pharmacy and they will contact us. Our fax number for refills is [phone].   Three business days of notice are needed for general medication refill requests.   Five business days of notice are needed for controlled substance refill requests.   If you need to change to a different pharmacy, please contact the new pharmacy directly. The new pharmacy will help you get your medications transferred.     Contact Us:  Please call [phone] during business hours (8-5:00 M-F).   If you have medication related questions after clinic hours, or on the weekend, please call [phone].     Financial Assistance [phone]   Medical Records [phone]       **For crisis resources, please see the information at the end of this document**     To find additional local resources, refer to Postpartum Support International (PSI). Available at: http://www.postpartum.net/get-help/locations/united-states/  -[facility] for Women's Mental Health at: www.womensmentalhealth.org is a good resource for information about psychiatric medication in pregnancy/lactation  -Mother To Baby A service of the nonprofit Organization of [employer] (SANDY), provides evidence based information online and in printable handouts to provide patients and providers regarding  "medications and other exposures during pregnancy and lactation Available at: https://mothertobaby.org/fact-sheets-parent/.  -The 4th Trimester Project - Expert written resources and information for mothers and families. Available at: https://Resilience/  -Consider using \"The Pregnancy and Postpartum Anxiety Workbook,\" by Catarina Benz PhD and Stef Spence PsyD.    Postpartum Planning Tools:  Maternal Wellbeing Plan from [facility]: https://www.health.[facility].[place].us/people/womeninfants/pmad/pmadsfs.html    4th Trimester Postpartum plan: https://Resilience/mypostpartumplan    Tips for partners:  http://www.postpartum.net/family/tips-for-postpartum-dads-and-partners/        MENTAL HEALTH CRISIS RESOURCES:  For a emergency help, please call 911 or go to the nearest Emergency Department.     Emergency Walk-In Options:   EmPATH Unit @ [facility]): [phone] - Specialized mental health emergency area designed to be calming  [facility] ([place]): [phone]  [facility] ([place]): [phone]  [facility]): [phone]    [facility] Crisis Information:   [place]: [phone]  [place]: [phone]  [place] (JT) - Adult: [phone]     Child: [phone]  [place] - Adult: [phone]     Child: [phone]  [place]: [phone]  List of all [facility] resources:   https://[place].gov/dhs/people-we-serve/adults/health-care/mental-health/resources/crisis-contacts.jsp    National Crisis Information:   Crisis Text Line: Text  [place]  to [address]  Suicide & Crisis Lifeline: 988  National Suicide Prevention Lifeline: 3-012-091-TALK (1-578.166.4732)       For online chat options, visit https://suicidepreventionlifeline.org/chat/  Poison Control Center: 1-778.341.4925  Trans Lifeline: 5-674-028-2835 - Hotline for transgender people of all ages  The [employer]win Project: 2-033-292-8656 - Hotline for LGBT youth     For Non-Emergency Support:   Fast " Tracker: Mental Health & Substance Use Disorder Resources -   https://www.[employer].org/

## 2024-06-12 NOTE — NURSING NOTE
Is the patient currently in the state of [place]? YES    Visit mode:VIDEO    If the visit is dropped, the patient can be reconnected by: VIDEO VISIT: Text to cell phone:   Telephone Information:   Mobile [phone]       Will anyone else be joining the visit? NO  (If patient encounters technical issues they should call [phone])    How would you like to obtain your AVS? MyChart    Are changes needed to the allergy or medication list? No    Are refills needed on medications prescribed by this physician? NO    Reason for visit: RECHECK    Hollie FRIEDMAN

## 2024-06-27 ENCOUNTER — OFFICE VISIT (OUTPATIENT)
Dept: OPTOMETRY | Facility: CLINIC | Age: 34
End: 2024-06-27
Payer: COMMERCIAL

## 2024-06-27 DIAGNOSIS — H52.13 MYOPIA OF BOTH EYES WITH ASTIGMATISM: Primary | ICD-10-CM

## 2024-06-27 DIAGNOSIS — H52.203 MYOPIA OF BOTH EYES WITH ASTIGMATISM: Primary | ICD-10-CM

## 2024-06-27 PROCEDURE — 92004 COMPRE OPH EXAM NEW PT 1/>: CPT | Performed by: OPTOMETRIST

## 2024-06-27 PROCEDURE — 92015 DETERMINE REFRACTIVE STATE: CPT | Performed by: OPTOMETRIST

## 2024-06-27 ASSESSMENT — REFRACTION_WEARINGRX
OS_CYLINDER: +2.00
OD_CYLINDER: +2.00
OD_SPHERE: -4.75
OS_SPHERE: -5.75
OS_AXIS: 176
OD_AXIS: 013
SPECS_TYPE: SVL

## 2024-06-27 ASSESSMENT — REFRACTION_MANIFEST
OS_CYLINDER: +2.75
OS_SPHERE: -6.00
OD_AXIS: 175
OD_CYLINDER: +2.25
OS_AXIS: 170
OD_SPHERE: -6.25
OD_CYLINDER: +2.00
METHOD_AUTOREFRACTION: 1
OD_AXIS: 002
OS_AXIS: 168
OD_SPHERE: -4.75
OS_SPHERE: -5.00
OS_CYLINDER: +2.00

## 2024-06-27 ASSESSMENT — CONF VISUAL FIELD
OS_SUPERIOR_NASAL_RESTRICTION: 0
OS_NORMAL: 1
OD_SUPERIOR_NASAL_RESTRICTION: 0
OD_INFERIOR_NASAL_RESTRICTION: 0
OS_INFERIOR_NASAL_RESTRICTION: 0
OD_SUPERIOR_TEMPORAL_RESTRICTION: 0
OD_INFERIOR_TEMPORAL_RESTRICTION: 0
METHOD: COUNTING FINGERS
OS_SUPERIOR_TEMPORAL_RESTRICTION: 0
OD_NORMAL: 1
OS_INFERIOR_TEMPORAL_RESTRICTION: 0

## 2024-06-27 ASSESSMENT — KERATOMETRY
OS_AXISANGLE_DEGREES: 4
OD_K2POWER_DIOPTERS: 46.37
OD_AXISANGLE2_DEGREES: 101
OD_AXISANGLE_DEGREES: 11
OS_K1POWER_DIOPTERS: 45.37
OD_K1POWER_DIOPTERS: 45.25
OS_K2POWER_DIOPTERS: 46.87
OS_AXISANGLE2_DEGREES: 94

## 2024-06-27 ASSESSMENT — SLIT LAMP EXAM - LIDS
COMMENTS: NORMAL
COMMENTS: NORMAL

## 2024-06-27 ASSESSMENT — VISUAL ACUITY
CORRECTION_TYPE: GLASSES
METHOD: SNELLEN - LINEAR
OD_CC: 20/20
OD_CC: 20/20
OS_CC: 20/20
OS_CC: 20/20

## 2024-06-27 ASSESSMENT — REFRACTION: OD_SPHERE: FINAL

## 2024-06-27 ASSESSMENT — TONOMETRY
OS_IOP_MMHG: 20
IOP_METHOD: APPLANATION
OD_IOP_MMHG: 22

## 2024-06-27 ASSESSMENT — EXTERNAL EXAM - LEFT EYE: OS_EXAM: NORMAL

## 2024-06-27 ASSESSMENT — CUP TO DISC RATIO
OD_RATIO: 0.2
OS_RATIO: 0.2

## 2024-06-27 ASSESSMENT — EXTERNAL EXAM - RIGHT EYE: OD_EXAM: NORMAL

## 2024-06-27 NOTE — LETTER
6/27/2024      Collette Boss  [address]  [place] [address]      Dear Colleague,    Thank you for referring your patient, Collette Boss, to the [facility]. Please see a copy of my visit note below.    Chief Complaint   Patient presents with     Annual Eye Exam        Last Eye Exam: 1 year ago   Dilated Previously: Yes, side effects of dilation explained today    What are you currently using to see?  Glasses and rarely contacts - does not want a contact lens eval today       Distance Vision Acuity: Noticed gradual change in both eyes with glasses     Near Vision Acuity: Satisfied with vision while reading and using computer with glasses    Eye Comfort: good  Do you use eye drops? : No      Katharine Pressleyor - Optometric Assistant       Fohx glaucoma both parents     Medical, surgical and family histories reviewed and updated 6/27/2024.       OBJECTIVE: See Ophthalmology exam    ASSESSMENT:    ICD-10-CM    1. Myopia of both eyes with astigmatism  H52.13     H52.203       Borderline IOP with significant fohx glaucoma normal ON    PLAN:   Updated prescription     Yesenia Nevarez OD     Again, thank you for allowing me to participate in the care of your patient.        Sincerely,        Yesenia Nevarez, OD

## 2024-06-27 NOTE — PROGRESS NOTES
Chief Complaint   Patient presents with    Annual Eye Exam        Last Eye Exam: 1 year ago   Dilated Previously: Yes, side effects of dilation explained today    What are you currently using to see?  Glasses and rarely contacts - does not want a contact lens eval today       Distance Vision Acuity: Noticed gradual change in both eyes with glasses     Near Vision Acuity: Satisfied with vision while reading and using computer with glasses    Eye Comfort: good  Do you use eye drops? : No      Katharine Nicolas - Optometric Assistant       Fohx glaucoma both parents     Medical, surgical and family histories reviewed and updated 6/27/2024.       OBJECTIVE: See Ophthalmology exam    ASSESSMENT:    ICD-10-CM    1. Myopia of both eyes with astigmatism  H52.13     H52.203       Borderline IOP with significant fohx glaucoma normal ON    PLAN:   Updated prescription     Yesenia Nevarez OD

## 2024-07-01 ENCOUNTER — OFFICE VISIT (OUTPATIENT)
Dept: FAMILY MEDICINE | Facility: CLINIC | Age: 34
End: 2024-07-01
Attending: FAMILY MEDICINE
Payer: COMMERCIAL

## 2024-07-01 VITALS
BODY MASS INDEX: 36.96 KG/M2 | OXYGEN SATURATION: 99 % | HEART RATE: 84 BPM | RESPIRATION RATE: 16 BRPM | WEIGHT: 235.5 LBS | SYSTOLIC BLOOD PRESSURE: 122 MMHG | HEIGHT: 67 IN | TEMPERATURE: 98.2 F | DIASTOLIC BLOOD PRESSURE: 77 MMHG

## 2024-07-01 DIAGNOSIS — R23.2 HOT FLASHES: ICD-10-CM

## 2024-07-01 DIAGNOSIS — E78.2 MIXED HYPERLIPIDEMIA: ICD-10-CM

## 2024-07-01 DIAGNOSIS — F33.1 MAJOR DEPRESSIVE DISORDER, RECURRENT EPISODE, MODERATE (H): Primary | ICD-10-CM

## 2024-07-01 DIAGNOSIS — Z86.32 HISTORY OF GESTATIONAL DIABETES MELLITUS (GDM): ICD-10-CM

## 2024-07-01 PROCEDURE — 99213 OFFICE O/P EST LOW 20 MIN: CPT | Performed by: FAMILY MEDICINE

## 2024-07-01 ASSESSMENT — PATIENT HEALTH QUESTIONNAIRE - PHQ9
SUM OF ALL RESPONSES TO PHQ QUESTIONS 1-9: 12
10. IF YOU CHECKED OFF ANY PROBLEMS, HOW DIFFICULT HAVE THESE PROBLEMS MADE IT FOR YOU TO DO YOUR WORK, TAKE CARE OF THINGS AT HOME, OR GET ALONG WITH OTHER PEOPLE: SOMEWHAT DIFFICULT
SUM OF ALL RESPONSES TO PHQ QUESTIONS 1-9: 12

## 2024-07-01 NOTE — PROGRESS NOTES
Assessment & Plan     Major depressive disorder, recurrent episode, moderate (H)  Postpartum depression/MDD. Established with psychiatry, continue medication changes per psychiatry. Continue therapy.    Hot flashes  She feels these are improved with gabapentin and does plan to start OCPs. She is following with OB.    History of gestational diabetes mellitus (GDM)  Due for diabetes testing - she would like to do this at the time of her appointment in January. Relieved to hear she does not have to do the GTT!    Mixed hyperlipidemia  Discussed lipid results - mild mixed dyslipidemia. We discussed that at her annual in January, we should check lipids while fasting. No need for medication at this time. Encouraged lifestyle changes as able - she is very busy with 8mo at home and her  works in [place] 50% of the time so she is often solo parenting.                Tila Murdock is a 34 year old, presenting for the following health issues:  Follow Up (Depression medication change , Tsh lab test,lipid and Perimenopausal)      7/1/2024    11:57 AM   Additional Questions   Roomed by MIKEY Recinos   Accompanied by self     HPI       Depression - on wellbutrin, thinks it is helping, she is following up with psychiatry as scheduled    Lots of stressors at home - 8mo,  is working 50% in [place], she has to solo parent, they are trying to change job situation, daughter now in     Perimenopause - gabapentin for sleep and night sweats, did 4 visits for accupuncture. Meds sleep to help, acupuncture calmed her at the very least.  Suggested birth control as well, but emotionally difficult to start given history of infertility    Wondering about lipid results              Review of Systems  Constitutional, HEENT, cardiovascular, pulmonary, gi and gu systems are negative, except as otherwise noted.      Objective    /77 (BP Location: Left arm, Patient Position: Sitting, Cuff Size: Adult Large)   Pulse 84  "  Temp 98.2  F (36.8  C) (Oral)   Resp 16   Ht 1.7 m (5' 6.93\")   Wt 106.8 kg (235 lb 8 oz)   LMP 06/26/2024 (Exact Date)   SpO2 99%   BMI 36.96 kg/m    Body mass index is 36.96 kg/m .  Physical Exam   General: well-appearing, no acute distress  HENT: normocephalic, atraumatic  Eyes: no conjunctival injection, no scleral icterus, EOMI  Neck: normal ROM, supple  Cardiovascular: regular rate  Pulmonary: normal effort  Abdomen: non-distended  Musculoskeletal: grossly normal ROM, no deformity  Extremities: no lower extremity edema  Psych: mood/affect normal             Signed Electronically by: Yesy Mays MD    "

## 2024-07-02 DIAGNOSIS — F33.1 MAJOR DEPRESSIVE DISORDER, RECURRENT EPISODE, MODERATE (H): ICD-10-CM

## 2024-07-02 PROBLEM — E78.2 MIXED HYPERLIPIDEMIA: Status: ACTIVE | Noted: 2024-07-02

## 2024-07-02 RX ORDER — BUPROPION HYDROCHLORIDE 150 MG/1
150 TABLET ORAL EVERY MORNING
Qty: 30 TABLET | Refills: 0 | Status: SHIPPED | OUTPATIENT
Start: 2024-07-02 | End: 2024-07-10

## 2024-07-02 NOTE — TELEPHONE ENCOUNTER
Last Seen: 6/12/2024  RTC: 4 weeks  Cancel: None  No-Show: None  Next Appt: 7/26/2024    Incoming Refill From Pharmacy via RightFax    Medication Requested: buPROPion (WELLBUTRIN XL) 150 MG 24 hr tablet   Directions: Take 1 tablet (150 mg) by mouth every morning - Oral   Qty: 30  Last Refill: 6/7/2024    Medication Refill pended Per Refill Protocol

## 2024-07-10 ENCOUNTER — VIRTUAL VISIT (OUTPATIENT)
Dept: PSYCHIATRY | Facility: CLINIC | Age: 34
End: 2024-07-10
Attending: STUDENT IN AN ORGANIZED HEALTH CARE EDUCATION/TRAINING PROGRAM
Payer: COMMERCIAL

## 2024-07-10 DIAGNOSIS — F33.1 MAJOR DEPRESSIVE DISORDER, RECURRENT EPISODE, MODERATE (H): ICD-10-CM

## 2024-07-10 PROCEDURE — G2211 COMPLEX E/M VISIT ADD ON: HCPCS | Mod: 95 | Performed by: STUDENT IN AN ORGANIZED HEALTH CARE EDUCATION/TRAINING PROGRAM

## 2024-07-10 PROCEDURE — 99214 OFFICE O/P EST MOD 30 MIN: CPT | Mod: 95 | Performed by: STUDENT IN AN ORGANIZED HEALTH CARE EDUCATION/TRAINING PROGRAM

## 2024-07-10 PROCEDURE — 90833 PSYTX W PT W E/M 30 MIN: CPT | Mod: 95 | Performed by: STUDENT IN AN ORGANIZED HEALTH CARE EDUCATION/TRAINING PROGRAM

## 2024-07-10 RX ORDER — BUPROPION HYDROCHLORIDE 150 MG/1
300 TABLET ORAL EVERY MORNING
Qty: 60 TABLET | Refills: 1 | Status: SHIPPED | OUTPATIENT
Start: 2024-07-10 | End: 2024-07-26

## 2024-07-10 ASSESSMENT — PAIN SCALES - GENERAL: PAINLEVEL: NO PAIN (0)

## 2024-07-10 ASSESSMENT — PATIENT HEALTH QUESTIONNAIRE - PHQ9: SUM OF ALL RESPONSES TO PHQ QUESTIONS 1-9: 6

## 2024-07-10 NOTE — PATIENT INSTRUCTIONS
Treatment Plan    Medications:  -Reduce Prozac to 40mg for the next two weeks. Will reassess next step of taper at the next appointment.  - Increase Wellbutrin XR to 300mg daily            **For crisis resources, please see the information at the end of this document**   Patient Education    Thank you for coming to the [facility].     Lab Testing:  If you had lab testing today and your results are reassuring or normal they will be mailed to you or sent through [employer] within 7 days. If the lab tests need quick action we will call you with the results. The phone number we will call with results is # [phone]. If this is not the best number please call our clinic and change the number.     Medication Refills:  If you need any refills please call your pharmacy and they will contact us. Our fax number for refills is [phone].   Three business days of notice are needed for general medication refill requests.   Five business days of notice are needed for controlled substance refill requests.   If you need to change to a different pharmacy, please contact the new pharmacy directly. The new pharmacy will help you get your medications transferred.     Contact Us:  Please call [phone] during business hours (8-5:00 M-F).   If you have medication related questions after clinic hours, or on the weekend, please call [phone].     Financial Assistance [phone]   Medical Records [phone]       To find additional local resources, refer to Postpartum Support International (PSI). Available at: http://www.postpartum.net/get-help/locations/united-[facility]/    -[facility]'s Mental Health at: www.womensmentalhealth.org is a good resource for information about psychiatric medication in pregnancy/lactation    -Mother To Baby A service of the nonprofit [employer] of [employer] (SANDY), provides evidence based information online and in printable handouts  "to provide patients and providers regarding medications and other exposures during pregnancy and lactation Available at: https://mothertobaby.org/fact-sheets-parent/.    -The 4th Trimester Project - Expert written resources and information for mothers and families. Available at: https://VISUALPLANT/    -Consider using \"The Pregnancy and Postpartum Anxiety Workbook,\" by Catarina Benz PhD and Stef Spence PsyD.    Postpartum Planning Tools:  Maternal Wellbeing Plan from [facility]: https://www.health.[facility].[place]/people/womeninfants/pmad/pmadsfs.html    4th Trimester Postpartum plan: https://VISUALPLANT/mypostpartumplan    Tips for partners:  http://www.postpartum.net/family/tips-for-postpartum-dads-and-partners/        MENTAL HEALTH CRISIS RESOURCES:  For a emergency help, please call 911 or go to the nearest Emergency Department.     Emergency Walk-In Options:   EmPATH Unit @ [facility]): [phone] - Specialized mental health emergency area designed to be [facility]  [facility] ([place]): [phone]  [facility] ([place]): [phone]  [facility]): [phone]    [facility] Crisis Information:   [place]: [phone]  [place]: [phone]  [place] ([place]) - Adult: [phone]     Child: [phone]  [place] - Adult: [phone]     Child: [phone]  [place]: [phone]  List of all [facility] resources:   https://[place].gov/dhs/people-we-serve/adults/health-care/mental-health/resources/crisis-contacts.jsp    National Crisis Information:   Crisis Text Line: Text  [place]  to 778520  Suicide & Crisis Lifeline: 988  National Suicide Prevention Lifeline: 9-729-244-TALK (1-506.809.2697)       For online chat options, visit https://suicidepreventionlifeline.org/chat/  Poison Control Center: 1-648.690.1666  Trans Lifeline: 6-783-540-8777 - Hotline for transgender people of all ages  The [employer] Project: 1-933.623.4012 - Hotline for LGBT " youth     For Non-Emergency Support:   Fast Tracker: Mental Health & Substance Use Disorder Resources -   https://www.FORVMn.org/

## 2024-07-10 NOTE — PROGRESS NOTES
Virtual Visit Details    Type of service:  Video Visit     Originating Location (pt. Location): Other Home    Distant Location (provider location):  On-site  Platform used for Video Visit: [facility]  MEDICAL PROGRESS NOTE     Collette Boss is a  34 year old currently in late postpartum, referred by Yesy Simeon for evaluation of mental health..    Partner/Support: -Conrado, (supportive)  Feeding Method: Stopped breastfeeding at 6 months. Currently using formula and mixed solids.      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    Today, Collette reports improvement in her energy level since starting Wellbutrin but she still feels exhausted, drained, and overwhelmed with caring for her daughter and day to day activities. In addition to navigating motherhood, she's still processing all that happened with her infertility and pregnancy experience. Feels ready to continue Prozac/Wellbutrin cross taper so we discussed reducing the Prozac dose to 40mg and increasing Wellbutrin to 300mg.  We will have close monitoring for No SI, HI, or acute safety mental health concern.      Safety Risk-Collette did not appear to be an imminent safety risk to self or others.    Psychotropic Drug Interactions:  [PSYCHCLINICDDI]  ADDITIVE SEROTONERGIC: Prozac, Duloxetine  FLUoxetine may enhance the neuroexcitatory and/or seizure-potentiating effect of BuPROPion. BuPROPion may increase the serum  concentration of FLUoxetine.  CNS Depressants(Gabapentin) may enhance the adverse/toxic effect of Selective Serotonin Reuptake Inhibitors. Specifically, the risk of psychomotor impairment may be enhanced.     Management: limit med redundancy    [place] was not checked today: not using controlled substances       Plan     1) Medications:   - Reduce Prozac to 40mg for the next two weeks. Will reassess next step of taper at the next appointment.  - Increase Wellbutrin XR to 300mg daily    Prescribed by OB  -Continue Gabapentin 100-300mg at bedtime PRN    2) Psychotherapy: Continue individual therapy    3) Next due:  Labs- Routine monitoring is not indicated for current psychotropic medication regimen   EKG- Routine monitoring is not indicated for current psychotropic medication regimen   Rating scales-  PHQ-9    4) Referrals: none    5) Other: none    6) Follow-up: Return to clinic in 2 weeks         Pertinent Background                                                   [most recent eval 05/29/24]     Collette first experienced mental health symptoms of anxiety as an undergraduate student. Had a lot of test anxiety with IBS. Started to experience depression 10 years ago. In that period, she lived abroad and drank heavily. She used Paxil which was helpful for some time. When she [place] to the [place], was placed on Lexapro which was helpful but got into a terrible  work situation and started drinking heavily again.She was placed on a different medication she doesn't remember.       Pertinent items include: SIB, hx of depression, alcohol use as a way to cope        Interim History     Since last visit:  - Reports that energy feels better with starting Wellbutrin but still experiencing a lot of depression. Reports that life feels very stressful and has been struggling with motherhood and parenting. Her  has been able to stay home to support her.  Her job is one-day/week and not stressful.  -Her day to day feels the  hardest in the mornings and between the hours of 4-7pm. She reports that she constantly feels depleted, exhausted, and burnt out. Having to care for a child that needs attention makes things really hard. It feels really hard for her to get replete.   -Has an upcoming trip away from home to help her  -Feels like her struggle with motherhood is multi-layered with her fertility journey, her experience through pregnancy, moving, solo parenting, and other major changes in her life.  -Doesn't feel ready to start the oral contraception prescribed by OB yet.    Sleep: Gabapentin has been helpful for sleep and night sweats.    Structure: Her  works in [place] for 3days/week and she has to solo parent which has felt horrible. The first step was getting Montserrat into  which made things better. Collette feels like she's able to breathe and can do things she enjoys.    Bonding/Attachment/Parenting: Reports that she has found motherhood to be harder than anticipated. Loves her daughter so much and has found it hard.    Child development/Milestones: On track and ahead with her milestones.      Recent Substance Use  Alcohol: Nightly-1 wine or beer    Reproductive Plans: Not on contraception. Isn't planning to have another child.    Important Summary Points & Treatment Events   24: Established care  24: Started Prozac taper. Start Wellbutrin. Continue PRN Gabapentin started by OB.  24: Pause Prozac taper. Continue dose as 60mg.  7/10/24: Continue Prozac taper. Reduce dose to 40mg and increase Wellbutrin to 300mg.       Mood & Anxiety Disorder (PMAD) History   Hx of  mood or anxiety disorder: Worsened depression while pregnant. Dose of antidepressant was increased to 80mg  Hx of  psychosis: N.A  Hx premenstrual mood/anxiety problems: Experienced dips in mood leading up to her period.   Hx mood symptoms while taking hormonal birth control: On birth control in college. Doesn't remember  Hx  of infertility: 5 rounds of IVF, IUI  Hx of traumatic birth: Had a traumatic pregnancy and is now just processing everything.  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 Supported by her income and 's        Children- Roxanne Sutton)       Living situation- Lives with her , daughter and pets.      Legal- None  Early history/Education- Born and raised in [place]. An only child. Struggled to have friends because of her upbringing. Did well in school and highest education level is a masters.  Social support-  for about 11 years. Recently moved from [place] . Moved because of 's job.  In-laws that live here. She finds them supportive. Most of her friends are still in [place].  Cultural influences/Impact- None       Feels safe at home- Yes  Family History-  Depression and AUD: Mother      Psychiatric Medication Trials       Drug /  Start Date Dose (mg) Helpful Taken during a  period? Adverse Effects   DC Reason / Date   Prozac        Paxil        Lexapro  Y but wore off      Cymbalta                    Medical / Surgical History                                                                                                                     Patient Active Problem List   Diagnosis    History of gestational diabetes mellitus (GDM)    History of pre-eclampsia    Diminished ovarian reserve    Family history of malignant neoplasm of breast    Gastroesophageal reflux disease, unspecified whether esophagitis present    Major depressive disorder, recurrent episode, moderate (H)     Class 2 severe obesity due to excess calories with serious comorbidity in adult (H)    Mixed hyperlipidemia       Past Surgical History:   Procedure Laterality Date     SECTION      OTHER SURGICAL HISTORY      Uterine polyp removal, hysteroscopy        Medical Review of Systems                                                                                       2,10   none in addition to that documented above  Allergy                                Patient has no known allergies.  Current Medications                                                                                                         Current Outpatient Medications   Medication Sig Dispense Refill    buPROPion (WELLBUTRIN XL) 150 MG 24 hr tablet Take 2 tablets (300 mg) by mouth every morning 60 tablet 1    calcium carbonate (TUMS) 500 MG chewable tablet Take 1 chew tab by mouth daily as needed for heartburn      famotidine (PEPCID) 20 MG tablet Take 1 tablet (20 mg) by mouth 2 times daily as needed (heartburn) 60 tablet 1    FLUoxetine (PROZAC) 40 MG capsule Take 1 capsule (40 mg) by mouth daily With 20mg for a total daily dose of 60mg. 30 capsule 1    gabapentin (NEURONTIN) 100 MG capsule Take 1-3 capsules (100-300 mg) by mouth nightly as needed for other (sleep) 90 capsule 1    triamcinolone (KENALOG) 0.1 % external cream Apply topically daily as needed for irritation       Vitals                                                                                             LMP 2024 (Exact Date)    Mental Status Exam                                                                            9, 14 cog gs   Alertness: alert  and oriented  Appearance: well groomed  Behavior/Demeanor: cooperative, with good  eye contact   Speech: regular rate and rhythm  Language:  Fluent in English  Psychomotor: normal or unremarkable  Mood: depressed  Affect: restricted; was congruent to mood; was congruent to content  Thought  Process/Associations:  linear and logical  Thought Content:  Reports none;  Denies suicidal ideation and violent ideation  Perception:  Reports none;  Denies auditory hallucinations and visual hallucinations  Insight: good  Judgment: good  Cognition: (6) does  appear grossly intact; formal cognitive testing was not done  Gait and Station:  N/A (Telehealth)     Labs and Data         6/12/2024     2:25 PM 7/1/2024    11:51 AM 7/10/2024     2:29 PM   PHQ   PHQ-9 Total Score 12 12 6   Q9: Thoughts of better off dead/self-harm past 2 weeks Not at all Not at all Not at all             5/29/2024     8:26 AM   PROMIS-10 Total Score w/o Sub Scores   PROMIS TOTAL - SUBSCORES 20         5/29/2024     8:26 AM   CAGE-AID Total Score   Total Score 2   Total Score MyChart 2 (A total score of 2 or greater is considered clinically significant)         6/12/2024     2:25 PM 7/1/2024    11:51 AM 7/10/2024     2:29 PM   PHQ-9 SCORE   PHQ-9 Total Score MyChart  12 (Moderate depression)    PHQ-9 Total Score 12 12 6         4/9/2024     2:41 PM 5/30/2024     1:27 PM   ALLI-7 SCORE   Total Score 7 (mild anxiety) 4 (minimal anxiety)   Total Score 7 4       Liver/Kidney Function, TSH Metabolic Blood counts   No lab results found.  Recent Labs   Lab Test 05/26/24  1027   TSH 2.00    Recent Labs   Lab Test 01/22/24  0818   CHOL 212*   TRIG 256*   *   HDL 44*     No lab results found.  No lab results found. Recent Labs   Lab Test 01/22/24  0818   WBC 6.3   HGB 13.3   HCT 40.3   MCV 88              ECG N/A QTc = N/Ams      PROVIDER: Tolulope Oluwadamilola Odebunmi, MD    Level of Medical Decision Making:   - At least 1 chronic problem that is not stable  - Engaged in prescription drug management during visit (discussed any medication benefits, side effects, alternatives, etc.)           The longitudinal plan of care for MDD was addressed during this visit. Due to the added complexity in care, I will continue to support Collette in the  subsequent management of this condition(s) and with the ongoing continuity of care of this condition(s).        Psychiatry Individual Psychotherapy Note   Psychotherapy start time - 2:40 PM  Psychotherapy end time - 3:00 PM  Date treatment plan last reviewed with patient - 05/29/24  Subjective: This supportive psychotherapy session addressed issues related to goals of therapy and current psychosocial stressors. Patient's reaction: Action in the context of mental status appropriate for ambulatory setting.    Interactive complexity indicated? No  Plan: RTC in timeframe noted above  Psychotherapy services during this visit included myself and the patient.   Treatment Plan      SYMPTOMS; PROBLEMS   MEASURABLE GOALS;    FUNCTIONAL IMPROVEMENT / GAINS INTERVENTIONS DISCHARGE CRITERIA   Depression: depressed mood, anhedonia, and low energy   reduce depressive symptoms Supportive / psychodynamic marked symptom improvement

## 2024-07-10 NOTE — NURSING NOTE
Current patient location: [address] [place] [address]    Is the patient currently in the state of [place]? YES    Visit mode:VIDEO    If the visit is dropped, the patient can be reconnected by: VIDEO VISIT: Text to cell phone:   Telephone Information:   Mobile [phone]       Will anyone else be joining the visit? NO  (If patient encounters technical issues they should call [phone]    How would you like to obtain your AVS? MyChart    Are changes needed to the allergy or medication list? No    Are refills needed on medications prescribed by this physician? NO    Reason for visit: MANINDER FRIEDMAN

## 2024-07-23 DIAGNOSIS — F33.1 MAJOR DEPRESSIVE DISORDER, RECURRENT EPISODE, MODERATE (H): ICD-10-CM

## 2024-07-23 RX ORDER — FLUOXETINE 40 MG/1
40 CAPSULE ORAL DAILY
Qty: 30 CAPSULE | Refills: 0 | Status: SHIPPED | OUTPATIENT
Start: 2024-07-23 | End: 2024-07-26

## 2024-07-23 NOTE — TELEPHONE ENCOUNTER
Last Seen: 7/10/2024  RTC: 2 weeks  Cancel: None  No-Show: None  Next Appt: 7/26/2024    Incoming Refill From Pharmacy via RightFax    Medication Requested: FLUoxetine (PROZAC) 40 MG capsule   Directions: Take 1 capsule (40 mg) by mouth daily   Qty: 30  Last Refill: 6/20/2024    Medication Refill pended Per Refill Protocol

## 2024-07-25 NOTE — PROGRESS NOTES
Virtual Visit Details    Type of service:  Phone Visit     Originating Location (pt. Location): Other Car    Distant Location (provider location):  Off-site  Platform [facility] for [facility] Visit: [facility]       [facility]  MEDICAL PROGRESS NOTE     Collette Boss is a  34 year old currently in late postpartum, referred by Yesy Simeon for evaluation of mental health..    Partner/Support: -Conrado, (supportive)  Feeding Method: Stopped breastfeeding at 6 months. Currently using formula and mixed solids.      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    Today, Collette reports improvement in her mood and energy level. Feels hesitant about continuing Prozac taper because she's starting to feel hopeful and can feel the lift in her mood. We opted to keep her medication regimen at the current dose.Will revisit continuing Prozac taper in 6 weeks. No SI, HI, or acute mental safety concerns.    Safety Risk-Collette did not appear to be an imminent safety risk to self or others.    Psychotropic Drug Interactions:  [PSYCHCLINICDDI]  ADDITIVE SEROTONERGIC: Prozac, Duloxetine  FLUoxetine may enhance the neuroexcitatory and/or seizure-potentiating effect of BuPROPion. BuPROPion may increase the serum concentration of FLUoxetine.  CNS Depressants(Gabapentin) may enhance the adverse/toxic effect of Selective Serotonin Reuptake Inhibitors. Specifically, the risk of psychomotor impairment may be enhanced.      Management: limit med redundancy    MNPMP was not checked today: not using controlled substances       Plan     1) Medications:   - Continue Prozac 40mg daily  - Continue Wellbutrin XR 300mg daily    Prescribed by OB  -Continue Gabapentin 100-300mg at bedtime PRN    2) Psychotherapy: Continue individual therapy    3) Next due:  Labs- Routine monitoring is not indicated for current psychotropic medication regimen   EKG- Routine monitoring is not indicated for current psychotropic medication regimen   Rating scales-  PHQ-9    4) Referrals: none    5) Other: none    6) Follow-up: Return to clinic in 6 weeks         Pertinent Background                                                   [most recent eval 05/29/24]     Collette first experienced mental health symptoms of anxiety as an undergraduate student. Had a lot of test anxiety with IBS. Started to experience depression 10 years ago. In that period, she lived abroad and drank heavily. She used Paxil which was helpful for some time. [place] the [place], was placed on Lexapro which was helpful but got into a terrible  work situation and started drinking heavily again.She was placed on a different medication she doesn't remember.       Pertinent items include: SIB, hx of depression, alcohol use as a way to cope        Interim History     Since last visit:  - Reports the increased dose of Wellbutrin has been starting to help with mood and energy. Starting to feel hopeful.  - Feels like there's hope at the end of the tunnel and this has been a relief for her.  - Things feel more manageable and her relationship with Montserrat is better.  - Hesitant about continuing the Prozac taper and would like to keep things at the current doses    Sleep: Gabapentin has been helpful for sleep and night sweats.    Structure: Her  works in [employer] for 3days/week and she has to solo parent which has felt hard. The first step was getting Montserrat into  which made things  better. Collette feels like she's able to breathe and can do things she enjoys when Montserrat is at .    Bonding/Attachment/Parenting: Reports that she has found motherhood to be harder than anticipated. Loves her daughter so much and has found it hard.    Child development/Milestones: On track and ahead with her milestones.      Recent Substance Use  Alcohol: Nightly-1 wine or beer    Reproductive Plans: Not on contraception. Isn't planning to have another child.    Important Summary Points & Treatment Events   24: Established care  24: Started Prozac taper. Start Wellbutrin. Continue PRN Gabapentin started by OB.  24: Pause Prozac taper. Continue dose as 60mg.  7/10/24: Continue Prozac taper. Reduce dose to 40mg and increase Wellbutrin to 300mg.  24: Hold off on Prozac taper.       Mood & Anxiety Disorder (PMAD) History   Hx of  mood or anxiety disorder: Worsened depression while pregnant. Dose of antidepressant was increased to 80mg  Hx of  psychosis: N.A  Hx premenstrual mood/anxiety problems: Experienced dips in mood leading up to her period.   Hx mood symptoms while taking hormonal birth control: On birth control in college. Doesn't remember  Hx of infertility: 5 rounds of IVF, IUI  Hx of traumatic birth: Had a traumatic pregnancy and is now just processing everything.  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 Supported by her income and 's        Children- Roxanne Sutton)       Living situation- Lives with her , daughter and pets.       Legal- None  Early history/Education- Born and raised in [place]. An only child. Struggled to have friends because of her upbringing. Did well in school and highest education level is a masters.  Social support-  for about 11 years. Recently moved from [place] . Moved because of 's job.  In-laws that live here. She finds them supportive. Most of her friends are still in [place].  Cultural influences/Impact- None       Feels safe at home- Yes  Family History-  Depression and AUD: Mother      Psychiatric Medication Trials       Drug /  Start Date Dose (mg) Helpful Taken during a  period? Adverse Effects   DC Reason / Date   Prozac        Paxil        Lexapro  Y but wore off      Cymbalta                    Medical / Surgical History                                                                                                                     Patient Active Problem List   Diagnosis    History of gestational diabetes mellitus (GDM)    History of pre-eclampsia    Diminished ovarian reserve    Family history of malignant neoplasm of breast    Gastroesophageal reflux disease, unspecified whether esophagitis present    Major depressive disorder, recurrent episode, moderate (H)    Class 2 severe obesity due to excess calories with serious comorbidity in adult (H)    Mixed hyperlipidemia       Past Surgical History:   Procedure Laterality Date     SECTION      OTHER SURGICAL HISTORY      Uterine polyp removal, hysteroscopy        Medical Review of Systems                                                                                       2,10   none in addition to that documented above  Allergy                                Patient has no known allergies.  Current Medications                                                                                                         Current Outpatient Medications   Medication Sig Dispense Refill    buPROPion (WELLBUTRIN XL) 150 MG 24  "hr tablet Take 2 tablets (300 mg) by mouth every morning 60 tablet 1    calcium carbonate (TUMS) 500 MG chewable tablet Take 1 chew tab by mouth daily as needed for heartburn      famotidine (PEPCID) 20 MG tablet Take 1 tablet (20 mg) by mouth 2 times daily as needed (heartburn) 60 tablet 1    FLUoxetine (PROZAC) 40 MG capsule Take 1 capsule (40 mg) by mouth daily 30 capsule 0    gabapentin (NEURONTIN) 100 MG capsule Take 1-3 capsules (100-300 mg) by mouth nightly as needed for other (sleep) 90 capsule 1    triamcinolone (KENALOG) 0.1 % external cream Apply topically daily as needed for irritation       Vitals                                                                                             LMP 06/26/2024 (Exact Date)    Mental Status Exam                                                                            9, 14 cog gs   Alertness: alert  and oriented  Appearance:  N/A (Telephone)  Behavior/Demeanor: cooperative, with  N/A(Telephone)  eye contact   Speech: regular rate and rhythm  Language:  Fluent in English  Psychomotor: normal or unremarkable  Mood:  \"Less depressed\"  Affect:  N/A (Telephone) ; was congruent to mood; was congruent to content  Thought Process/Associations:  linear and logical  Thought Content:  Reports none;  Denies suicidal ideation and violent ideation  Perception:  Reports none;  Denies auditory hallucinations and visual hallucinations  Insight: good  Judgment: good  Cognition: (6) does  appear grossly intact; formal cognitive testing was not done  Gait and Station:  N/A ([facility])     Labs and Data         7/1/2024    11:51 AM 7/10/2024     2:29 PM 7/26/2024    11:10 AM   PHQ   PHQ-9 Total Score 12 6 4   Q9: Thoughts of better off dead/self-harm past 2 weeks Not at all Not at all Not at all     Answers submitted by the patient for this visit:  Patient Health Questionnaire (Submitted on 7/26/2024)  If you checked off any problems, how difficult have these problems made it for " you to do your work, take care of things at home, or get along with other people?: Somewhat difficult  PHQ9 TOTAL SCORE: 4          5/29/2024     8:26 AM   PROMIS-10 Total Score w/o Sub Scores   PROMIS TOTAL - SUBSCORES 20         5/29/2024     8:26 AM   CAGE-AID Total Score   Total Score 2   Total Score MyChart 2 (A total score of 2 or greater is considered clinically significant)         7/1/2024    11:51 AM 7/10/2024     2:29 PM 7/26/2024    11:10 AM   PHQ-9 SCORE   PHQ-9 Total Score MyChart 12 (Moderate depression)  4 (Minimal depression)   PHQ-9 Total Score 12 6 4         4/9/2024     2:41 PM 5/30/2024     1:27 PM   ALLI-7 SCORE   Total Score 7 (mild anxiety) 4 (minimal anxiety)   Total Score 7 4       Liver/Kidney Function, TSH Metabolic Blood counts   No lab results found.  Recent Labs   Lab Test 05/26/24  1027   TSH 2.00    Recent Labs   Lab Test 01/22/24  0818   CHOL 212*   TRIG 256*   *   HDL 44*     No lab results found.  No lab results found. Recent Labs   Lab Test 01/22/24  0818   WBC 6.3   HGB 13.3   HCT 40.3   MCV 88              ECG N/A QTc = N/Ams      PROVIDER: Tolulope Oluwadamilola Odebunmi, MD    Level of Medical Decision Making:   - At least 1 chronic problem that is not stable  - Engaged in prescription drug management during visit (discussed any medication benefits, side effects, alternatives, etc.)           The longitudinal plan of care for MDD was addressed during this visit. Due to the added complexity in care, I will continue to support Collette in the subsequent management of this condition(s) and with the ongoing continuity of care of this condition(s).        Psychiatry Individual Psychotherapy Note   Psychotherapy start time - n/a AM  Psychotherapy end time - n/a AM  Date treatment plan last reviewed with patient - 05/29/24  Subjective: This supportive psychotherapy session addressed issues related to goals of therapy and current psychosocial stressors. Patient's reaction:  Action in the context of mental status appropriate for ambulatory setting.    Interactive complexity indicated? No  Plan: RTC in timeframe noted above  Psychotherapy services during this visit included myself and the patient.   Treatment Plan      SYMPTOMS; PROBLEMS   MEASURABLE GOALS;    FUNCTIONAL IMPROVEMENT / GAINS INTERVENTIONS DISCHARGE CRITERIA   Depression: depressed mood, anhedonia, and low energy   reduce depressive symptoms Supportive / psychodynamic marked symptom improvement

## 2024-07-26 ENCOUNTER — VIRTUAL VISIT (OUTPATIENT)
Dept: PSYCHIATRY | Facility: CLINIC | Age: 34
End: 2024-07-26
Attending: STUDENT IN AN ORGANIZED HEALTH CARE EDUCATION/TRAINING PROGRAM
Payer: COMMERCIAL

## 2024-07-26 ENCOUNTER — MYC MEDICAL ADVICE (OUTPATIENT)
Dept: PSYCHIATRY | Facility: CLINIC | Age: 34
End: 2024-07-26
Payer: COMMERCIAL

## 2024-07-26 DIAGNOSIS — F33.1 MAJOR DEPRESSIVE DISORDER, RECURRENT EPISODE, MODERATE (H): Primary | ICD-10-CM

## 2024-07-26 DIAGNOSIS — F33.1 MAJOR DEPRESSIVE DISORDER, RECURRENT EPISODE, MODERATE (H): ICD-10-CM

## 2024-07-26 PROCEDURE — G2211 COMPLEX E/M VISIT ADD ON: HCPCS | Mod: 95 | Performed by: STUDENT IN AN ORGANIZED HEALTH CARE EDUCATION/TRAINING PROGRAM

## 2024-07-26 PROCEDURE — 99214 OFFICE O/P EST MOD 30 MIN: CPT | Mod: 95 | Performed by: STUDENT IN AN ORGANIZED HEALTH CARE EDUCATION/TRAINING PROGRAM

## 2024-07-26 RX ORDER — BUPROPION HYDROCHLORIDE 300 MG/1
300 TABLET ORAL EVERY MORNING
Qty: 30 TABLET | Refills: 0 | Status: SHIPPED | OUTPATIENT
Start: 2024-07-26 | End: 2024-08-26

## 2024-07-26 RX ORDER — FLUOXETINE 40 MG/1
40 CAPSULE ORAL DAILY
Qty: 30 CAPSULE | Refills: 1 | Status: SHIPPED | OUTPATIENT
Start: 2024-07-26 | End: 2024-09-04

## 2024-07-26 RX ORDER — BUPROPION HYDROCHLORIDE 150 MG/1
300 TABLET ORAL EVERY MORNING
Qty: 60 TABLET | Refills: 1 | Status: SHIPPED | OUTPATIENT
Start: 2024-07-26 | End: 2024-07-26

## 2024-07-26 ASSESSMENT — PATIENT HEALTH QUESTIONNAIRE - PHQ9
10. IF YOU CHECKED OFF ANY PROBLEMS, HOW DIFFICULT HAVE THESE PROBLEMS MADE IT FOR YOU TO DO YOUR WORK, TAKE CARE OF THINGS AT HOME, OR GET ALONG WITH OTHER PEOPLE: SOMEWHAT DIFFICULT
SUM OF ALL RESPONSES TO PHQ QUESTIONS 1-9: 4
SUM OF ALL RESPONSES TO PHQ QUESTIONS 1-9: 4

## 2024-07-26 NOTE — PATIENT INSTRUCTIONS
Treatment Plan    Medications:  - Continue Prozac 40mg daily  - Continue Wellbutrin XR 300mg daily    Therapy:  Continue individual therapy       **For crisis resources, please see the information at the end of this document**   Patient Education    Thank you for coming to the [facility].     Lab Testing:  If you had lab testing today and your results are reassuring or normal they will be mailed to you or sent through ice within 7 days. If the lab tests need quick action we will call you with the results. The phone number we will call with results is # [phone]. If this is not the best number please call our clinic and change the number.     Medication Refills:  If you need any refills please call your pharmacy and they will contact us. Our fax number for refills is [phone].   Three business days of notice are needed for general medication refill requests.   Five business days of notice are needed for controlled substance refill requests.   If you need to change to a different pharmacy, please contact the new pharmacy directly. The new pharmacy will help you get your medications transferred.     Contact Us:  Please call [phone] during business hours (8-5:00 M-F).   If you have medication related questions after clinic hours, or on the weekend, please call [phone].     Financial Assistance [phone]   Medical Records [phone]       To find additional local resources, refer to Postpartum Support International (PSI). Available at: http://www.postpartum.net/get-help/locations/united-states/    -[facility] for Women's Mental Health at: www.womensmentalhealth.org is a good resource for information about psychiatric medication in pregnancy/lactation    -Mother To Baby A service of the nonprofit Organization of Teratology Information Specialists (SANDY), provides evidence based information online and in printable handouts to provide patients and providers  "regarding medications and other exposures during pregnancy and lactation Available at: https://mothertobaby.org/fact-sheets-parent/.    -The 4th Trimester Project - Expert written resources and information for mothers and families. Available at: https://R[employer]/    -Consider using \"The Pregnancy and Postpartum Anxiety Workbook,\" by Catarina Benz PhD and Stef Spence PsyD.    Postpartum Planning Tools:  Maternal Wellbeing Plan from [facility]: https://www.health.[facility].mn.[place]/people/womeninfants/pmad/pmadsfs.html    4th Trimester Postpartum plan: https://[employer] Internet/mypostpartumplan    Tips for partners:  http://www.postpartum.net/family/tips-for-postpartum-dads-and-partners/        MENTAL HEALTH CRISIS RESOURCES:  For a emergency help, please call 911 or go to the nearest Emergency Department.     Emergency Walk-In Options:   EmPATH Unit @ [facility]): [phone] - Specialized mental health emergency area designed to be [facility]  [facility] ([place]): [phone]  [facility] ([place]): [phone]  [facility]): [phone]    [facility] Crisis Information:   [place]: [phone]  [place]: [phone]  [place] (JT) - Adult: [phone]     Child: [phone]  [place] - Adult: [phone]     Child: [phone]  [place]: [phone]  List of all [facility] resources:   https://[place].gov/dhs/people-we-serve/adults/health-care/mental-health/resources/crisis-contacts.jsp    National Crisis Information:   Crisis Text Line: Text  [place]  to 045518  Suicide & Crisis Lifeline: 988  National Suicide Prevention Lifeline: 9-018-328-TALK (1-843.152.1699)       For online chat options, visit https://suicidepreventionlifeline.org/chat/  Poison Control Center: 1-347.499.8236  Trans Lifeline: 1-977-447-6454 - Hotline for transgender people of all ages  The Sherwin Project: 1-181.373.4817 - Hotline for LGBT youth     For Non-Emergency " Support:   Fast Tracker: Mental Health & Substance Use Disorder Resources -   https://www.[employer].org/

## 2024-07-26 NOTE — TELEPHONE ENCOUNTER
Last Seen: 7/26/2024  RTC: 6 weeks  Cancel: None  No-Show: None  Next Appt: None    Incoming Refill From Pharmacy via RightFax    Medication Requested: buPROPion (WELLBUTRIN XL) 300 MG 24 hr tablet   Directions: Take 1 tablet (300 mg) by mouth every morning   Qty: 30  Last Refill: 150 mg was filled on 7/23    Medication Refill pended Per Refill Protocol

## 2024-08-20 ENCOUNTER — MYC MEDICAL ADVICE (OUTPATIENT)
Dept: PSYCHIATRY | Facility: CLINIC | Age: 34
End: 2024-08-20
Payer: COMMERCIAL

## 2024-08-21 NOTE — TELEPHONE ENCOUNTER
Odebunmi, Tolulope Oluwadamilola, MD  You9 minutes ago (9:41 AM)       Jeanna Ferris,    Thank you for going ahead to schedule Collette.    I am thinking of changing up her medications at the next appointment, if that's something she would be open to.    In the meantime, please could you confirm if she's still taking the Gabapentin prescribed by OB? That could be a medication to also help with the anxiety she's experiencing. If she could use that until our next appointment.    She's currently prescribed 100-300mg at bedtime PRN by OB. She can take it as 100-300mg BID or TID PRN with additional 100mg at night to help with sleep, if needed.    Thank you,  Olivia

## 2024-08-26 DIAGNOSIS — F33.1 MAJOR DEPRESSIVE DISORDER, RECURRENT EPISODE, MODERATE (H): ICD-10-CM

## 2024-08-27 ENCOUNTER — TELEPHONE (OUTPATIENT)
Dept: PSYCHIATRY | Facility: CLINIC | Age: 34
End: 2024-08-27
Payer: COMMERCIAL

## 2024-08-27 RX ORDER — BUPROPION HYDROCHLORIDE 300 MG/1
300 TABLET ORAL EVERY MORNING
Qty: 30 TABLET | Refills: 0 | Status: SHIPPED | OUTPATIENT
Start: 2024-08-27 | End: 2024-09-04

## 2024-08-27 NOTE — TELEPHONE ENCOUNTER
"Date of Last Office Visit: Virtual Visit  7/26/2024  [facility]    RTC: 6wks  No shows: 0  Cancellations: 0  Date of Next Office Visit:    9/4/2024 1:00 PM (30 min)  Rolanda   Arrive by: 12:45 PM   [facility] RETURN    URPSY ([facility])   Odebunmi, Tolulope Oluwadamilola, MD     ------------------------------    Medication requested:     Disp Refills Start End DANNY   buPROPion (WELLBUTRIN XL) 300 MG 24 hr tablet 30 tablet 0 7/26/2024 -- No   Sig - Route: Take 1 tablet (300 mg) by mouth every morning - Oral     ------------------------------  From last visit note:   \"- Continue Prozac 40mg daily  - Continue Wellbutrin XR 300mg daily\"       Refill decision: Medication refilled per protocol.                         "

## 2024-08-27 NOTE — TELEPHONE ENCOUNTER
Ingrid Sorensen, Ingrid Branham, RN; Starla Braswell, RN; Michelle Lyman, RN  Hello Team,  Please could you call Collette for a check in this week?    I tried calling her this morning but she didn't . The check in is to see how she's doing and to confirm that 9/1 works for her. We had sent her some recs via [employer] but I noticed that it hasn't been read.    Thank you,  [place]    Follow up:     Reached out to patient to connect regarding symptoms. No answer at number provided. LVM, requesting a call back. Clinic number provided.

## 2024-08-28 NOTE — TELEPHONE ENCOUNTER
Second attempt made to reach out to patient. No answer at number provided. LVM, requesting a call back. Clinic number provided.

## 2024-09-04 ENCOUNTER — VIRTUAL VISIT (OUTPATIENT)
Dept: PSYCHIATRY | Facility: CLINIC | Age: 34
End: 2024-09-04
Attending: STUDENT IN AN ORGANIZED HEALTH CARE EDUCATION/TRAINING PROGRAM
Payer: COMMERCIAL

## 2024-09-04 DIAGNOSIS — F33.1 MAJOR DEPRESSIVE DISORDER, RECURRENT EPISODE, MODERATE (H): ICD-10-CM

## 2024-09-04 DIAGNOSIS — F41.1 GAD (GENERALIZED ANXIETY DISORDER): Primary | ICD-10-CM

## 2024-09-04 PROCEDURE — 99214 OFFICE O/P EST MOD 30 MIN: CPT | Mod: 95 | Performed by: STUDENT IN AN ORGANIZED HEALTH CARE EDUCATION/TRAINING PROGRAM

## 2024-09-04 PROCEDURE — G2211 COMPLEX E/M VISIT ADD ON: HCPCS | Mod: 95 | Performed by: STUDENT IN AN ORGANIZED HEALTH CARE EDUCATION/TRAINING PROGRAM

## 2024-09-04 RX ORDER — DESOGESTREL AND ETHINYL ESTRADIOL 0.15-0.03
1 KIT ORAL
COMMUNITY
Start: 2024-08-29

## 2024-09-04 RX ORDER — BUPROPION HYDROCHLORIDE 300 MG/1
300 TABLET ORAL EVERY MORNING
Qty: 30 TABLET | Refills: 1 | Status: SHIPPED | OUTPATIENT
Start: 2024-09-04 | End: 2024-09-25

## 2024-09-04 RX ORDER — VILAZODONE HYDROCHLORIDE 10 MG/1
TABLET ORAL
Qty: 74 TABLET | Refills: 0 | Status: SHIPPED | OUTPATIENT
Start: 2024-09-04 | End: 2024-09-25

## 2024-09-04 ASSESSMENT — ANXIETY QUESTIONNAIRES
GAD7 TOTAL SCORE: 9
8. IF YOU CHECKED OFF ANY PROBLEMS, HOW DIFFICULT HAVE THESE MADE IT FOR YOU TO DO YOUR WORK, TAKE CARE OF THINGS AT HOME, OR GET ALONG WITH OTHER PEOPLE?: SOMEWHAT DIFFICULT
GAD7 TOTAL SCORE: 9
7. FEELING AFRAID AS IF SOMETHING AWFUL MIGHT HAPPEN: SEVERAL DAYS
GAD7 TOTAL SCORE: 9

## 2024-09-04 ASSESSMENT — PATIENT HEALTH QUESTIONNAIRE - PHQ9
SUM OF ALL RESPONSES TO PHQ QUESTIONS 1-9: 6
10. IF YOU CHECKED OFF ANY PROBLEMS, HOW DIFFICULT HAVE THESE PROBLEMS MADE IT FOR YOU TO DO YOUR WORK, TAKE CARE OF THINGS AT HOME, OR GET ALONG WITH OTHER PEOPLE: VERY DIFFICULT
SUM OF ALL RESPONSES TO PHQ QUESTIONS 1-9: 6

## 2024-09-04 NOTE — PATIENT INSTRUCTIONS
Treatment Plan    Medications:  - Reduce Prozac to 20mg daily for 2 weeks, then stop  - Start Viibryd 10mg for 2 weeks, then increase to 20mg  - Continue Wellbutrin XR 300mg daily    Therapy:  Continue individual therapy          **For crisis resources, please see the information at the end of this document**   Patient Education    Thank you for coming to the [facility].     Lab Testing:  If you had lab testing today and your results are reassuring or normal they will be mailed to you or sent through theRightAPI within 7 days. If the lab tests need quick action we will call you with the results. The phone number we will call with results is # [phone]. If this is not the best number please call our clinic and change the number.     Medication Refills:  If you need any refills please call your pharmacy and they will contact us. Our fax number for refills is [phone].   Three business days of notice are needed for general medication refill requests.   Five business days of notice are needed for controlled substance refill requests.   If you need to change to a different pharmacy, please contact the new pharmacy directly. The new pharmacy will help you get your medications transferred.     Contact Us:  Please call [phone] during business hours (8-5:00 M-F).   If you have medication related questions after clinic hours, or on the weekend, please call [phone].     Financial Assistance [phone]   Medical Records [phone]       To find additional local resources, refer to Postpartum Support International (PSI). Available at: http://www.postpartum.net/get-help/locations/united-[facility]/    -[facility] for Women's Mental Health at: www.womensmentalhealth.org is a good resource for information about psychiatric medication in pregnancy/lactation    -Mother To Baby A service of the nonprofit Organization of Teratology Information Specialists (SANDY), provides evidence based  "information online and in printable handouts to provide patients and providers regarding medications and other exposures during pregnancy and lactation Available at: https://mothertobaby.org/fact-sheets-parent/.    -The 4th Trimester Project - Expert written resources and information for mothers and families. Available at: https://FlowMedica/    -Consider using \"The Pregnancy and Postpartum Anxiety Workbook,\" by Catarina Benz PhD and Stef Spence PsyD.    Postpartum Planning Tools:  Maternal Wellbeing Plan from [facility]: https://www.health.[facility].mn.us/people/womeninfants/pmad/pmadsfs.html    4th Trimester Postpartum plan: https://FlowMedica/mypostpartumplan    Tips for partners:  http://www.postpartum.net/family/tips-for-postpartum-dads-and-partners/        MENTAL HEALTH CRISIS RESOURCES:  For a emergency help, please call 911 or go to the nearest Emergency Department.     Emergency Walk-In Options:   EmPATH Unit @ [facility]): [phone] - Specialized mental health emergency area designed to be [facility]  [facility] ([place]): [phone]  [facility] ([place]): [phone]  [facility]): [phone]    [facility] Crisis Information:   [place]: [phone]  [place]: [phone]  [place] (JT) - Adult: [phone]     Child: [phone]  [place] - Adult: [phone]     Child: [phone]  [place]: [phone]  List of all [facility] resources:   https://[place].gov/dhs/people-we-serve/adults/health-care/mental-health/resources/crisis-contacts.jsp    National Crisis Information:   Crisis Text Line: Text  [place]  to 514608  Suicide & Crisis Lifeline: 988  National Suicide Prevention Lifeline: 9-003-026-TALK (1-677.126.9903)       For online chat options, visit https://suicidepreventionlifeline.org/chat/  Poison Control Center: 7-263-280-9295  Trans Lifeline: 1-114.737.1992 - Hotline for transgender people of all ages  The " Sherwin Project: 2-839-217-0530 - Hotline for LGBT youth     For Non-Emergency Support:   Fast Tracker: Mental Health & Substance Use Disorder Resources -   https://www."[employer]n.org/

## 2024-09-04 NOTE — PROGRESS NOTES
Virtual Visit Details    Type of service:  Video Visit     Originating Location (pt. Location): Home    Distant Location (provider location):  Off-site  Platform used for Video Visit: [facility]  MEDICAL PROGRESS NOTE     Collette Boss is a  34 year old currently in late postpartum, referred by Yesy Loera-Kati for evaluation of mental health..    Partner/Support: -Conrado, (supportive)  Feeding Method: Stopped breastfeeding at 6 months. Currently using formula and mixed solids.      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    Today, Collette continues to report improvement in energy and fatigue level though reports that she hasn't noticed any other mood changes.In the past 3 weeks, experienced increased anxiety characterized by waking up from sleep in a panic with to-do lists racing through her mind and feeling unable to fall back asleep. This also lingers on during the day and she's unable to relax during the day. We discussed some non-pharmacologic approaches for that and using low dose Gabapentin during the day. We will do across taper of sertraline to viibryd to target underling depression and anxiety.  In the past 3 weeks, the two changes Collette noted were the increase in Wellbutrin that occurred some weeks prior and starting a new OCP that has been helpful for night sweats and hot flashes. Has found Wellbutrin so helpful that she's hesitant  to reduce or the dose or discontinue. We will go ahead with medication plan described below. No SI, HI, or acute mental safety concerns.    Safety Risk-Collette did not appear to be an imminent safety risk to self or others.    Psychotropic Drug Interactions:  [PSYCHCLINICDDI]  ADDITIVE SEROTONERGIC: Prozac, Duloxetine  FLUoxetine may enhance the neuroexcitatory and/or seizure-potentiating effect of BuPROPion. BuPROPion may increase the serum concentration of FLUoxetine.  CNS Depressants(Gabapentin) may enhance the adverse/toxic effect of Selective Serotonin Reuptake Inhibitors. Specifically, the risk of psychomotor impairment may be enhanced.     Management: limit med redundancy    [place] was not [place] today: not using controlled substances       Plan     1) Medications:   - Reduce Prozac to 20mg daily for 2 weeks, then stop  - Start Viibryd 10mg for 2 weeks, then increase to 20mg  - Continue Wellbutrin XR 300mg daily    Prescribed by OB  -Continue Gabapentin 100-300mg at bedtime PRN (can take 100mg during the day as needed)    2) Psychotherapy: Continue individual therapy    3) Next due:  Labs- Routine monitoring is not indicated for current psychotropic medication regimen   EKG- Routine monitoring is not indicated for current psychotropic medication regimen   Rating scales-  PHQ-9    4) Referrals: none    5) Other: none    6) Follow-up: Return to clinic in 3 weeks         Pertinent Background                                                   [most recent eval 05/29/24]     Collette first experienced mental health symptoms of anxiety as an undergraduate student. Had a lot of test anxiety with IBS. Started to experience depression 10 years ago. In that period, she lived [place] and drank heavily. She used Paxil which was helpful for some time. When she moved back to the [place], was placed on Lexapro which was helpful but got into a terrible  work situation and started drinking heavily again.She was placed on a different  medication she doesn't remember.       Pertinent items include: SIB, hx of depression, alcohol use as a way to cope        Interim History     Since last visit:  - Reports that she's still feeling energized and doesn't have to nap during the day.  - Still struggling with depression and feels like anxiety has been worse over the past several weeks. Has been unable to relax at home and has been waking up early in the mornings in a panic. This has occurred 1-2x/week.   -Started Apri, a contraceptive about a month ago that has been helpful for night sweats and hot flashes.    Sleep: Gabapentin has been helpful for sleep. Occasionally she would wake up in a panic and be unable to get back to sleep. When she wakes up in a panic, the thoughts are usually around her to-do list and how she's a bad mother.    Structure: Her  works in [place] for 3days/week and she has to solo parent which has felt hard. The first step was getting Montserrat into  which made things better. Collette feels like she's able to breathe and can do things she enjoys when Montserrat is at .    Bonding/Attachment/Parenting: Reports that she has found motherhood to be harder than anticipated. Loves her daughter so much and has found it hard.    Child development/Milestones: On track and ahead with her milestones.      Recent Substance Use  Alcohol: Nightly-1 wine or beer    Reproductive Plans: Not on contraception. Isn't planning to have another child.    Important Summary Points & Treatment Events   5/29/24: Established care  6/7/24: Started Prozac taper. Start Wellbutrin. Continue PRN Gabapentin started by OB.  6/12/24: Pause Prozac taper. Continue dose as 60mg.  7/10/24: Continue Prozac taper. Reduce dose to 40mg and increase Wellbutrin to 300mg.  7/25/24: Hold off on Prozac taper.  9/4/24: Reduce Prozac to 20mg for 2weeks, then stop. Start Viibryd for depression and anxiety. Encouraged to use PRN Gabapentin prescribed by OB during the day for  daytime anxiety.       Mood & Anxiety Disorder (PMAD) History   Hx of  mood or anxiety disorder: Worsened depression while pregnant. Dose of antidepressant was increased to 80mg  Hx of  psychosis: N.A  Hx premenstrual mood/anxiety problems: Experienced dips in mood leading up to her period.   Hx mood symptoms while taking hormonal birth control: On birth control in college. Doesn't remember  Hx of infertility: 5 rounds of IVF, IUI  Hx of traumatic birth: Had a traumatic pregnancy and is now just processing everything.  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 Supported by her income and 's        Children- Roxanne Sutton)       Living situation- Lives with her , daughter and pets.      Legal- None  Early history/Education- Born and raised in [place]. An only child. Struggled to have friends because of her upbringing. Did well in school and highest education level is a masters.  Social support-  for about 11 years. Recently moved from [place] . Moved because of 's job.  In-laws that live here. She finds them supportive. Most of her friends are still in [place].  Cultural influences/Impact- None       Feels safe at home- Yes  Family History-  Depression and AUD: Mother      Psychiatric Medication Trials       Drug /  Start Date Dose (mg) Helpful Taken during a  period? Adverse Effects   DC Reason / Date   Prozac        Paxil        Lexapro  Y but wore off      Cymbalta                    Medical / Surgical History                                                                                                                      Patient Active Problem List   Diagnosis    History of gestational diabetes mellitus (GDM)    History of pre-eclampsia    Diminished ovarian reserve    Family history of malignant neoplasm of breast    Gastroesophageal reflux disease, unspecified whether esophagitis present    Major depressive disorder, recurrent episode, moderate (H)    Class 2 severe obesity due to excess calories with serious comorbidity in adult (H)    Mixed hyperlipidemia       Past Surgical History:   Procedure Laterality Date     SECTION      OTHER SURGICAL HISTORY      Uterine polyp removal, hysteroscopy        Medical Review of Systems                                                                                       2,10   none in addition to that documented above  Allergy                                Patient has no known allergies.  Current Medications                                                                                                         Current Outpatient Medications   Medication Sig Dispense Refill    buPROPion (WELLBUTRIN XL) 300 MG 24 hr tablet Take 1 tablet (300 mg) by mouth every morning. 30 tablet 0    calcium carbonate (TUMS) 500 MG chewable tablet Take 1 chew tab by mouth daily as needed for heartburn      famotidine (PEPCID) 20 MG tablet Take 1 tablet (20 mg) by mouth 2 times daily as needed (heartburn) 60 tablet 1    FLUoxetine (PROZAC) 40 MG capsule Take 1 capsule (40 mg) by mouth daily 30 capsule 1    gabapentin (NEURONTIN) 100 MG capsule Take 1-3 capsules (100-300 mg) by mouth nightly as needed for other (sleep) 90 capsule 1    triamcinolone (KENALOG) 0.1 % external cream Apply topically daily as needed for irritation       Vitals                                                                                             There were no vitals taken for this visit.   Mental Status Exam                          "                                                   9, 14 cog gs   Alertness: alert  and oriented  Appearance: well groomed  Behavior/Demeanor: cooperative, with good  eye contact   Speech: regular rate and rhythm  Language:  Fluent in English  Psychomotor: normal or unremarkable  Mood:  \"anxious\"  Affect: appropriate; was congruent to mood; was congruent to content  Thought Process/Associations:  linear and logical  Thought Content:  Reports none;  Denies suicidal ideation and violent ideation  Perception:  Reports none;  Denies auditory hallucinations and visual hallucinations  Insight: good  Judgment: good  Cognition: (6) does  appear grossly intact; formal cognitive testing was not done  Gait and Station:  N/A ([facility])     Labs and Data         7/10/2024     2:29 PM 7/26/2024    11:10 AM 9/4/2024    12:27 PM   PHQ   PHQ-9 Total Score 6 4 6   Q9: Thoughts of better off dead/self-harm past 2 weeks Not at all Not at all Not at all     Answers submitted by the patient for this visit:  Patient Health Questionnaire (Submitted on 9/4/2024)  If you checked off any problems, how difficult have these problems made it for you to do your work, take care of things at home, or get along with other people?: Very difficult  PHQ9 TOTAL SCORE: 6  Patient Health Questionnaire (G7) (Submitted on 9/4/2024)  ALLI 7 TOTAL SCORE: 9          5/29/2024     8:26 AM 9/4/2024    12:30 PM   PROMIS-10 Total Score w/o Sub Scores   PROMIS TOTAL - SUBSCORES 20 24         5/29/2024     8:26 AM   CAGE-AID Total Score   Total Score 2   Total Score MyChart 2 (A total score of 2 or greater is considered clinically significant)         7/10/2024     2:29 PM 7/26/2024    11:10 AM 9/4/2024    12:27 PM   PHQ-9 SCORE   PHQ-9 Total Score MyChart  4 (Minimal depression) 6 (Mild depression)   PHQ-9 Total Score 6 4 6         4/9/2024     2:41 PM 5/30/2024     1:27 PM 9/4/2024    12:28 PM   ALLI-7 SCORE   Total Score 7 (mild anxiety) 4 (minimal anxiety) 9 " (mild anxiety)   Total Score 7 4 9       Liver/Kidney Function, TSH Metabolic Blood counts   No lab results found.  Recent Labs   Lab Test 05/26/24  1027   TSH 2.00    Recent Labs   Lab Test 01/22/24  0818   CHOL 212*   TRIG 256*   *   HDL 44*     No lab results found.  No lab results found. Recent Labs   Lab Test 01/22/24  0818   WBC 6.3   HGB 13.3   HCT 40.3   MCV 88              ECG N/A QTc = N/Ams      PROVIDER: Tolulope Oluwadamilola Odebunmi, MD    Level of Medical Decision Making:   - At least 1 chronic problem that is not stable  - Engaged in prescription drug management during visit (discussed any medication benefits, side effects, alternatives, etc.)           The longitudinal plan of care for MDD was addressed during this visit. Due to the added complexity in care, I will continue to support Collette in the subsequent management of this condition(s) and with the ongoing continuity of care of this condition(s).        Psychiatry Individual Psychotherapy Note   Psychotherapy start time - 1: 10 PM  Psychotherapy end time - 1: 17 PM  Date treatment plan last reviewed with patient - 05/29/24  Subjective: This supportive psychotherapy session addressed issues related to goals of therapy and current psychosocial stressors. Patient's reaction: Action in the context of mental status appropriate for ambulatory setting.    Interactive complexity indicated? No  Plan: RTC in timeframe noted above  Psychotherapy services during this visit included myself and the patient.   Treatment Plan      SYMPTOMS; PROBLEMS   MEASURABLE GOALS;    FUNCTIONAL IMPROVEMENT / GAINS INTERVENTIONS DISCHARGE CRITERIA   Depression: depressed mood, anhedonia, and low energy   reduce depressive symptoms Supportive / psychodynamic marked symptom improvement

## 2024-09-04 NOTE — NURSING NOTE
Current patient location:  [place]     Is the patient currently in the state of [place]? YES    Visit mode:VIDEO    If the visit is dropped, the patient can be reconnected by: VIDEO VISIT: Text to cell phone:   Telephone Information:   Mobile [phone]       Will anyone else be joining the visit? NO  (If patient encounters technical issues they should call [phone])    How would you like to obtain your AVS? MyChart    Are changes needed to the allergy or medication list? Pt stated no changes to allergies and Pt stated no med changes    Are refills needed on medications prescribed by this physician? NO    Rooming Documentation:  Questionnaire(s) completed      Reason for visit: MANINDER QUINNF

## 2024-09-24 NOTE — PROGRESS NOTES
Virtual Visit Details    Type of service:  Video Visit     Originating Location (pt. Location): Home    Distant Location (provider location):  Off-site  Platform used for Video Visit: [facility]  MEDICAL PROGRESS NOTE     Collette Boss is a  34 year old  referred by Yesy Loera-Kati for evaluation of mental health..    Partner/Support: -Conrado, (supportive)  Feeding Method: Stopped breastfeeding at 6 months. Currently using formula and mixed solids.      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    Today, Collette completed Prozac taper and is on the 4th day of 20mg of Viibryd. Has started to notice that she feels hopeful, enjoys being a mother, able to better cope with stressors, increased flexibility in her thinking, less anxiety, and improved nightime awakening. Headache she initially had at the start of the crosstaper has subsided and she feels like things are on the right track. She's also able to identify additional contributors to improvements in mood she has noticed like additional support with her  working from home, her daughter getting older, and having time for individual interests. She feels concerned about having a relapse ind epressive symptoms and would like to achieve full remission of symptoms. As a result,s he would liek to explore TMS. Referral has been sent to the SLP clinc. There's room to increase Viibryd and increase can be  done in between appointments. No SI, HI, or acute mental safety concerns.    Safety Risk-Collette did not appear to be an imminent safety risk to self or others.    Psychotropic Drug Interactions:  [PSYCHCLINICDDI]  ADDITIVE SEROTONERGIC: Prozac, Duloxetine  FLUoxetine may enhance the neuroexcitatory and/or seizure-potentiating effect of BuPROPion. BuPROPion may increase the serum concentration of FLUoxetine.  CNS Depressants(Gabapentin) may enhance the adverse/toxic effect of Selective Serotonin Reuptake Inhibitors. Specifically, the risk of psychomotor impairment may be enhanced.     Management: limit med redundancy    [place] was not checked today: not using controlled substances       Plan     1) Medications:   - Continue Viibryd 20mg daily  - Continue Wellbutrin XR 300mg daily    Prescribed by OB  -Continue Gabapentin 100-300mg at bedtime PRN (can take 100mg during the day as needed)    2) Psychotherapy: Continue individual therapy    3) Next due:  Labs- Routine monitoring is not indicated for current psychotropic medication regimen   EKG- Routine monitoring is not indicated for current psychotropic medication regimen   Rating scales-  PHQ-9, ALLI-7    4) Referrals: Specialty Program- SLP/TRD clinic    5) Other: none    6) Follow-up: Return to clinic in 4 weeks         Pertinent Background                                                   [most recent eval 05/29/24]     Collette first experienced mental health symptoms of anxiety as an undergraduate student. Had a lot of test anxiety with IBS. Started to experience depression 10 years ago. In that period, she lived [place] and drank heavily. She used Paxil which was helpful for some time. When [place] to the [place], was placed on Lexapro which was helpful but got into a terrible work situation and started drinking heavily again.She was placed on a different medication she doesn't remember.       Pertinent items include: SIB, hx of depression, alcohol use as a way to  cope        Interim History     Since last visit:  - Initially experienced headaches with the cross taper which improved. Stopped Prozac.  -Has started to experience a weight has lifted and is starting to enjoy motherhood more.Feels more flexible in her thinking and able to cope better.  -Thinks that with her daughter getting older and her  working from home has helped her to do her own things  -Though she's starting to feel better, interested in exploring TMS as an additional means to achieve remission. Feels very concerned about a relapse occurring in the future.    Sleep: Gabapentin and Unisom have been helpful for sleep. Still takes sometime to sleep but has noticed reduced nighttime awakening with less ruminations and anxiety.  .    Bonding/Attachment/Parenting: Enjoying motherhood more.    Child development/Milestones: On track and ahead with her milestones.      Recent Substance Use  Alcohol: Nightly-1 wine or beer    Reproductive Plans: Not on contraception. Isn't planning to have another child.    Important Summary Points & Treatment Events   24: Established care  24: Started Prozac taper. Start Wellbutrin. Continue PRN Gabapentin started by OB.  24: Pause Prozac taper. Continue dose as 60mg.  7/10/24: Continue Prozac taper. Reduce dose to 40mg and increase Wellbutrin to 300mg.  24: Hold off on Prozac taper.  24: Reduce Prozac to 20mg for 2weeks, then stop. Start Viibryd for depression and anxiety. Encouraged to use PRN Gabapentin prescribed by OB during the day for daytime anxiety.  24: completed Prozac taper. Starting to notice efficacy on Viibryd.     Mood & Anxiety Disorder (PMAD) History   Hx of  mood or anxiety disorder: Worsened depression while pregnant. Dose of antidepressant was increased to 80mg  Hx of  psychosis: N.A  Hx premenstrual mood/anxiety problems: Experienced dips in mood leading up to her period.   Hx mood symptoms while  taking hormonal birth control: On birth control in college. Doesn't remember  Hx of infertility: 5 rounds of IVF, IUI  Hx of traumatic birth: Had a traumatic pregnancy and is now just processing everything.  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 Supported by her income and 's        Children- Roxanne Sutton)       Living situation- Lives with her , daughter and pets.      Legal- None  Early history/Education- Born and raised in [place]. An only child. Struggled to have friends because of her upbringing. Did well in school and highest education level is a masters.  Social support-  for about 11 years. Recently moved from [place] . Moved because of 's job.  In-laws that live here. She finds them supportive. Most of her friends are still in [place].  Cultural influences/Impact- None       Feels safe at home- Yes  Family History-  Depression and AUD: Mother      Psychiatric Medication Trials       Drug /  Start Date Dose (mg) Helpful Taken during a  period? Adverse Effects   DC Reason / Date   Prozac  Y   Discontinued due to lack of efficacy   Paxil/ Started many years ago  Was helpful but discontinued when she moved back to the .      Lexapro  Y but wore off      Cymbalta                    Medical / Surgical History                                                                                                                     Patient Active Problem List   Diagnosis    History of gestational diabetes mellitus (GDM)    History of pre-eclampsia    Diminished  ovarian reserve    Family history of malignant neoplasm of breast    Gastroesophageal reflux disease, unspecified whether esophagitis present    Major depressive disorder, recurrent episode, moderate (H)    Class 2 severe obesity due to excess calories with serious comorbidity in adult (H)    Mixed hyperlipidemia       Past Surgical History:   Procedure Laterality Date     SECTION      OTHER SURGICAL HISTORY      Uterine polyp removal, hysteroscopy        Medical Review of Systems                                                                                       2,10   none in addition to that documented above  Allergy                                Patient has no known allergies.  Current Medications                                                                                                         Current Outpatient Medications   Medication Sig Dispense Refill    buPROPion (WELLBUTRIN XL) 300 MG 24 hr tablet Take 1 tablet (300 mg) by mouth every morning. 30 tablet 1    vilazodone (VIIBRYD) 10 MG TABS tablet Take 2 tablets (20 mg) by mouth daily. 60 tablet 0    APRI 0.15-30 MG-MCG tablet Take 1 tablet by mouth daily at 2 pm.      calcium carbonate (TUMS) 500 MG chewable tablet Take 1 chew tab by mouth daily as needed for heartburn      famotidine (PEPCID) 20 MG tablet Take 1 tablet (20 mg) by mouth 2 times daily as needed (heartburn) 60 tablet 1    gabapentin (NEURONTIN) 100 MG capsule Take 1-3 capsules (100-300 mg) by mouth nightly as needed for other (sleep) 90 capsule 1    triamcinolone (KENALOG) 0.1 % external cream Apply topically daily as needed for irritation       Vitals                                                                                             There were no vitals taken for this visit.   Mental Status Exam                                                                            9, 14 cog gs   Alertness: alert  and oriented  Appearance: well groomed  Behavior/Demeanor:  "cooperative, with good  eye contact   Speech: regular rate and rhythm  Language:  Fluent in English  Psychomotor: normal or unremarkable  Mood:  \"anxious\"  Affect: appropriate; was congruent to mood; was congruent to content  Thought Process/Associations:  linear and logical  Thought Content:  Reports none;  Denies suicidal ideation and violent ideation  Perception:  Reports none;  Denies auditory hallucinations and visual hallucinations  Insight: good  Judgment: good  Cognition: (6) does  appear grossly intact; formal cognitive testing was not done  Gait and Station:  N/A (Telehealth)     Labs and Data         7/26/2024    11:10 AM 9/4/2024    12:27 PM 9/25/2024    11:49 AM   PHQ   PHQ-9 Total Score 4 6 4   Q9: Thoughts of better off dead/self-harm past 2 weeks Not at all Not at all Not at all     Answers submitted by the patient for this visit:  Patient Health Questionnaire (Submitted on 9/25/2024)  If you checked off any problems, how difficult have these problems made it for you to do your work, take care of things at home, or get along with other people?: Somewhat difficult  PHQ9 TOTAL SCORE: 4            5/29/2024     8:26 AM 9/4/2024    12:30 PM 9/25/2024    11:50 AM   PROMIS-10 Total Score w/o Sub Scores   PROMIS TOTAL - SUBSCORES 20 24 32         5/29/2024     8:26 AM   CAGE-AID Total Score   Total Score 2   Total Score MyChart 2 (A total score of 2 or greater is considered clinically significant)         7/26/2024    11:10 AM 9/4/2024    12:27 PM 9/25/2024    11:49 AM   PHQ-9 SCORE   PHQ-9 Total Score MyChart 4 (Minimal depression) 6 (Mild depression) 4 (Minimal depression)   PHQ-9 Total Score 4 6 4         4/9/2024     2:41 PM 5/30/2024     1:27 PM 9/4/2024    12:28 PM   ALLI-7 SCORE   Total Score 7 (mild anxiety) 4 (minimal anxiety) 9 (mild anxiety)   Total Score 7 4 9       Liver/Kidney Function, TSH Metabolic Blood counts   No lab results found.  Recent Labs   Lab Test 05/26/24  1027   TSH 2.00    " Recent Labs   Lab Test 01/22/24  0818   CHOL 212*   TRIG 256*   *   HDL 44*     No lab results found.  No lab results found. Recent Labs   Lab Test 01/22/24  0818   WBC 6.3   HGB 13.3   HCT 40.3   MCV 88              ECG N/A QTc = N/Ams      PROVIDER: Tolulope Oluwadamilola Odebunmi, MD    Level of Medical Decision Making:   - At least 2 stable chronic problems  - Engaged in prescription drug management during visit (discussed any medication benefits, side effects, alternatives, etc.)           The longitudinal plan of care for MDD was addressed during this visit. Due to the added complexity in care, I will continue to support Collette in the subsequent management of this condition(s) and with the ongoing continuity of care of this condition(s).        Psychiatry Individual Psychotherapy Note   Psychotherapy start time - na PM  Psychotherapy end time - na PM  Date treatment plan last reviewed with patient - 05/29/24  Subjective: This supportive psychotherapy session addressed issues related to goals of therapy and current psychosocial stressors. Patient's reaction: Action in the context of mental status appropriate for ambulatory setting.    Interactive complexity indicated? No  Plan: RTC in timeframe noted above  Psychotherapy services during this visit included myself and the patient.   Treatment Plan      SYMPTOMS; PROBLEMS   MEASURABLE GOALS;    FUNCTIONAL IMPROVEMENT / GAINS INTERVENTIONS DISCHARGE CRITERIA   Depression: depressed mood, anhedonia, and low energy   reduce depressive symptoms Supportive / psychodynamic marked symptom improvement

## 2024-09-25 ENCOUNTER — VIRTUAL VISIT (OUTPATIENT)
Dept: PSYCHIATRY | Facility: CLINIC | Age: 34
End: 2024-09-25
Attending: STUDENT IN AN ORGANIZED HEALTH CARE EDUCATION/TRAINING PROGRAM
Payer: COMMERCIAL

## 2024-09-25 DIAGNOSIS — F33.1 MAJOR DEPRESSIVE DISORDER, RECURRENT EPISODE, MODERATE (H): ICD-10-CM

## 2024-09-25 DIAGNOSIS — F41.1 GAD (GENERALIZED ANXIETY DISORDER): ICD-10-CM

## 2024-09-25 PROCEDURE — G2211 COMPLEX E/M VISIT ADD ON: HCPCS | Mod: 95 | Performed by: STUDENT IN AN ORGANIZED HEALTH CARE EDUCATION/TRAINING PROGRAM

## 2024-09-25 PROCEDURE — 99214 OFFICE O/P EST MOD 30 MIN: CPT | Mod: 95 | Performed by: STUDENT IN AN ORGANIZED HEALTH CARE EDUCATION/TRAINING PROGRAM

## 2024-09-25 RX ORDER — BUPROPION HYDROCHLORIDE 300 MG/1
300 TABLET ORAL EVERY MORNING
Qty: 30 TABLET | Refills: 1 | Status: SHIPPED | OUTPATIENT
Start: 2024-09-25

## 2024-09-25 RX ORDER — VILAZODONE HYDROCHLORIDE 10 MG/1
20 TABLET ORAL DAILY
Qty: 60 TABLET | Refills: 0 | Status: SHIPPED | OUTPATIENT
Start: 2024-09-25

## 2024-09-25 ASSESSMENT — PATIENT HEALTH QUESTIONNAIRE - PHQ9
SUM OF ALL RESPONSES TO PHQ QUESTIONS 1-9: 4
SUM OF ALL RESPONSES TO PHQ QUESTIONS 1-9: 4
10. IF YOU CHECKED OFF ANY PROBLEMS, HOW DIFFICULT HAVE THESE PROBLEMS MADE IT FOR YOU TO DO YOUR WORK, TAKE CARE OF THINGS AT HOME, OR GET ALONG WITH OTHER PEOPLE: SOMEWHAT DIFFICULT

## 2024-09-25 ASSESSMENT — PAIN SCALES - GENERAL: PAINLEVEL: NO PAIN (0)

## 2024-09-25 NOTE — NURSING NOTE
Current patient location: [address] [place] [address]    Is the patient currently in the state of [place]? YES    Visit mode:VIDEO    If the visit is dropped, the patient can be reconnected by: VIDEO VISIT: Text to cell phone:   Telephone Information:   Mobile [phone]       Will anyone else be joining the visit? NO  (If patient encounters technical issues they should call [phone])    How would you like to obtain your AVS? MyChart    Are changes needed to the allergy or medication list? NO    Are refills needed on medications prescribed by this physician? NO    Rooming Documentation:  Questionnaire(s) completed    Reason for visit: RECHECK    Hollie QUINNF

## 2024-10-02 DIAGNOSIS — F41.1 GAD (GENERALIZED ANXIETY DISORDER): ICD-10-CM

## 2024-10-02 DIAGNOSIS — F33.1 MAJOR DEPRESSIVE DISORDER, RECURRENT EPISODE, MODERATE (H): ICD-10-CM

## 2024-10-02 RX ORDER — VILAZODONE HYDROCHLORIDE 20 MG/1
20 TABLET ORAL DAILY
Qty: 30 TABLET | OUTPATIENT
Start: 2024-10-02

## 2024-10-02 NOTE — TELEPHONE ENCOUNTER
Addressed in a different encounter and refilled 9/25/24 for 30 days.[id number]  vilazodone (VIIBRYD) 10 MG TABS tablet 60 tablet 0 9/25/2024

## 2024-10-04 ENCOUNTER — TELEPHONE (OUTPATIENT)
Dept: PSYCHIATRY | Facility: CLINIC | Age: 34
End: 2024-10-04
Payer: COMMERCIAL

## 2024-10-04 NOTE — TELEPHONE ENCOUNTER
1 page of incoming records from [facility] stating pt received the Covid-19 booster vaccine on 09/27/2024.  Sent to scanning 10/04/2024.    - Frederic Dominguez, Visit Facilitator

## 2024-10-11 ENCOUNTER — MYC REFILL (OUTPATIENT)
Dept: PSYCHIATRY | Facility: CLINIC | Age: 34
End: 2024-10-11
Payer: COMMERCIAL

## 2024-10-11 DIAGNOSIS — F41.1 GAD (GENERALIZED ANXIETY DISORDER): ICD-10-CM

## 2024-10-11 DIAGNOSIS — F33.1 MAJOR DEPRESSIVE DISORDER, RECURRENT EPISODE, MODERATE (H): ICD-10-CM

## 2024-10-14 ENCOUNTER — MYC MEDICAL ADVICE (OUTPATIENT)
Dept: PSYCHIATRY | Facility: CLINIC | Age: 34
End: 2024-10-14
Payer: COMMERCIAL

## 2024-10-14 DIAGNOSIS — F41.1 GAD (GENERALIZED ANXIETY DISORDER): ICD-10-CM

## 2024-10-14 DIAGNOSIS — F33.1 MAJOR DEPRESSIVE DISORDER, RECURRENT EPISODE, MODERATE (H): ICD-10-CM

## 2024-10-14 RX ORDER — VILAZODONE HYDROCHLORIDE 10 MG/1
20 TABLET ORAL DAILY
Qty: 60 TABLET | Refills: 0 | Status: SHIPPED | OUTPATIENT
Start: 2024-10-14 | End: 2024-10-15

## 2024-10-14 NOTE — TELEPHONE ENCOUNTER
Last seen: 9/25  RTC: 4 weeks  Cancel: None  No-show: None  Next appt: 10/25       Medication requested:   vilazodone (VIIBRYD) 10 MG TABS tablet     From chart note:   1) Medications:   - Continue Viibryd 20mg daily  - Continue Wellbutrin XR 300mg daily       Refills sent to RN for final review

## 2024-10-15 RX ORDER — VILAZODONE HYDROCHLORIDE 10 MG/1
20 TABLET ORAL DAILY
Qty: 60 TABLET | Refills: 0 | Status: SHIPPED | OUTPATIENT
Start: 2024-10-15 | End: 2024-10-25

## 2024-10-15 NOTE — TELEPHONE ENCOUNTER
From chart note:   1) Medications:   - Continue Viibryd 20mg daily  - Continue Wellbutrin XR 300mg daily       Medication unable to be refilled by RN due to criteria not met as indicated.                 []Eligibility - not seen in the last year              []Supervision - no future appointment              []Compliance - no shows, cancellations or lapse in therapy              []Verification - order discrepancy              []Controlled medication              []Medication not included in policy              []90-day supply request              []Other:

## 2024-10-25 ENCOUNTER — VIRTUAL VISIT (OUTPATIENT)
Dept: PSYCHIATRY | Facility: CLINIC | Age: 34
End: 2024-10-25
Attending: STUDENT IN AN ORGANIZED HEALTH CARE EDUCATION/TRAINING PROGRAM
Payer: COMMERCIAL

## 2024-10-25 DIAGNOSIS — F41.1 GAD (GENERALIZED ANXIETY DISORDER): ICD-10-CM

## 2024-10-25 DIAGNOSIS — F33.1 MAJOR DEPRESSIVE DISORDER, RECURRENT EPISODE, MODERATE (H): ICD-10-CM

## 2024-10-25 PROCEDURE — 99214 OFFICE O/P EST MOD 30 MIN: CPT | Mod: 95 | Performed by: STUDENT IN AN ORGANIZED HEALTH CARE EDUCATION/TRAINING PROGRAM

## 2024-10-25 PROCEDURE — G2211 COMPLEX E/M VISIT ADD ON: HCPCS | Mod: 95 | Performed by: STUDENT IN AN ORGANIZED HEALTH CARE EDUCATION/TRAINING PROGRAM

## 2024-10-25 RX ORDER — VILAZODONE HYDROCHLORIDE 10 MG/1
20 TABLET ORAL DAILY
Qty: 60 TABLET | Refills: 0 | Status: CANCELLED | OUTPATIENT
Start: 2024-10-25

## 2024-10-25 RX ORDER — VILAZODONE HYDROCHLORIDE 20 MG/1
20 TABLET ORAL DAILY
Qty: 30 TABLET | Refills: 1 | Status: SHIPPED | OUTPATIENT
Start: 2024-10-25

## 2024-10-25 RX ORDER — BUPROPION HYDROCHLORIDE 300 MG/1
300 TABLET ORAL EVERY MORNING
Qty: 30 TABLET | Refills: 1 | Status: SHIPPED | OUTPATIENT
Start: 2024-10-25

## 2024-10-25 NOTE — PATIENT INSTRUCTIONS
**For crisis resources, please see the information at the end of this document**   Patient Education    Thank you for coming to the [facility].     Lab Testing:  If you had lab testing today and your results are reassuring or normal they will be mailed to you or sent through [employer] within 7 days. If the lab tests need quick action we will call you with the results. The phone number we will call with results is # [phone]. If this is not the best number please call our clinic and change the number.     Medication Refills:  If you need any refills please call your pharmacy and they will contact us. Our fax number for refills is [phone].   Three business days of notice are needed for general medication refill requests.   Five business days of notice are needed for controlled substance refill requests.   If you need to change to a different pharmacy, please contact the new pharmacy directly. The new pharmacy will help you get your medications transferred.     Contact Us:  Please call [phone] during business hours (8-5:00 M-F).   If you have medication related questions after clinic hours, or on the weekend, please call [phone].     Financial Assistance [phone]   Medical Records [phone]       To find additional local resources, refer to Postpartum Support International (PSI). Available at: http://www.postpartum.net/get-help/locations/united-states/    -[facility] Women's Mental Health at: www.womensmentalhealth.org is a good resource for information about psychiatric medication in pregnancy/lactation    -Mother To Baby A service of the nonprofit Organization of Terat[employer] Information [employer] (SANDY), provides evidence based information online and in printable handouts to provide patients and providers regarding medications and other exposures during pregnancy and lactation Available at: https://mothertobaby.org/fact-sheets-parent/.    -The 4th  "Trimester Project - Expert written resources and information for mothers and families. Available at: https://Bildero/    -Consider using \"The Pregnancy and Postpartum Anxiety Workbook,\" by Catarina Benz PhD and Stef Spence PsyD.    Postpartum Planning Tools:  Maternal Wellbeing Plan from [facility]: https://www.health.[facility].[place]/people/womeninfants/pmad/pmadsfs.html    4th Trimester Postpartum plan: https://Bildero/mypostpartumplan    Tips for partners:  http://www.postpartum.net/family/tips-for-postpartum-dads-and-partners/        MENTAL HEALTH CRISIS RESOURCES:  For a emergency help, please call 911 or go to the nearest Emergency Department.     Emergency Walk-In Options:   EmPATH Unit @ [facility] ([place]): [phone] - Specialized mental health emergency area designed to be calming  [facility] ([place]): [phone]  [facility] ([place]): [phone]  [facility]): [phone]    [facility] Crisis Information:   [place]: [phone]  [place]: [phone]  [place] ([place]) - Adult: [phone]     Child: [phone]  [place] - Adult: [phone]     Child: [phone]  [place]: [phone]  List of all [facility] resources:   https://[place].gov/dhs/people-we-serve/adults/health-care/mental-health/resources/crisis-contacts.jsp    National Crisis Information:   Crisis Text Line: Text  [place]  to 352243  Suicide & Crisis Lifeline: 988  National Suicide Prevention Lifeline: 3-831-264-AGYI (1-914.169.8796)       For online chat options, visit https://suicidepreventionlifeline.org/chat/  Poison Control Center: 1-618.591.9309  Trans Lifeline: 1-283.264.5417 - Hotline for transgender people of all ages  The [employer] Project: 1-450.743.3715 - Hotline for LGBT youth     For Non-Emergency Support:   Fast Tracker: Mental Health & Substance Use Disorder Resources -   https://www.[employer] Systemsn.org/        "

## 2024-10-25 NOTE — PROGRESS NOTES
Virtual Visit Details    Type of service:  Video Visit     Originating Location (pt. Location): Home    Distant Location (provider location):  Off-site  Platform used for Video Visit: [facility]  MEDICAL PROGRESS NOTE     Collette Boss is a  34 year old  referred by Yesy Simeon for evaluation of mental health..    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    Today, Collette reports improvement in mood with her current medication regimen. She still experiences some off days but things are better than they used to be. Uses Gabapentin PRN for anxiety and sleep. Still interested in TMS but things don't feel as acute as they did so she's considering it as more long term. We discussed utilizing light therapy if she starts to notice mood changes with the darker hours setting in earlier. No SI, HI, or acute mental safety concerns.    Safety Risk-Collette did not appear to be an imminent safety risk to self or others.    Psychotropic Drug Interactions:  [PSYCHCLINICDDI]  FLUoxetine may enhance the neuroexcitatory and/or seizure-potentiating effect of BuPROPion. BuPROPion may increase the serum concentration of FLUoxetine.   BuPROPion may enhance the adverse/toxic effect of Vilazodone   CNS Depressants(Gabapentin) may enhance the adverse/toxic effect of Selective Serotonin Reuptake Inhibitors. Specifically, the risk  of psychomotor impairment may be enhanced.     Management: limit med redundancy    [place] was [place] today: not using controlled substances       Plan     1) Medications:   - Continue Viibryd 20mg daily  - Continue Wellbutrin XR 300mg daily  -Continue Gabapentin 100-300mg at bedtime PRN (can take 100mg during the day as needed)    2) Psychotherapy: Continue individual therapy    3) Next due:  Labs- Routine monitoring is not indicated for current psychotropic medication regimen   EKG- Routine monitoring is not indicated for current psychotropic medication regimen   Rating scales-  PHQ-9, ALLI-7    4) Referrals: Specialty Program- SLP/TRD clinic    5) Other: none    6) Follow-up: Return to clinic in 8 weeks         Pertinent Background                                                   [most recent eval 05/29/24]     Collette first experienced mental health symptoms of anxiety as an undergraduate student. Had a lot of test anxiety with IBS. Started to experience depression 10 years ago. In that period, she lived [place] and drank heavily. She used Paxil which was helpful for some time. When she [place] to the [place], was placed on Lexapro which was helpful but got into a terrible work situation and started drinking heavily again.She was placed on a different medication she doesn't remember.       Pertinent items include: SIB, hx of depression, alcohol use as a way to cope        Interim History     Since last visit:  - Reports that she's doing better with the medication regimen.  -Still having some anxiety at night but gabapentin has been helpful for sleep.  - Feels more stable and things aren't so hard anymore.  - Still taking the time she needs for her mental health  - Has been using Apri for night sweats and has been helpful.      Sleep: Gabapentin has been helpful for this. Taking it as needed.        Recent Substance Use  Alcohol: Nightly-1 wine or beer    Reproductive Plans: Not planning to get pregnant and feels  relief about her decision. It took some time for and her and her  to reach the decision and she has been able to process it in therapy.    Important Summary Points & Treatment Events   24: Established care  24: Started Prozac taper. Start Wellbutrin. Continue PRN Gabapentin started by OB.  24: Pause Prozac taper. Continue dose as 60mg.  7/10/24: Continue Prozac taper. Reduce dose to 40mg and increase Wellbutrin to 300mg.  24: Hold off on Prozac taper.  24: Reduce Prozac to 20mg for 2weeks, then stop. Start Viibryd for depression and anxiety. Encouraged to use PRN Gabapentin prescribed by OB during the day for daytime anxiety.  24: completed Prozac taper. Starting to notice efficacy on Viibryd. TMS referral sent.     Mood & Anxiety Disorder (PMAD) History   Hx of  mood or anxiety disorder: Worsened depression while pregnant. Dose of antidepressant was increased to 80mg  Hx of  psychosis: N.A  Hx premenstrual mood/anxiety problems: Experienced dips in mood leading up to her period.   Hx mood symptoms while taking hormonal birth control: On birth control in college. Doesn't remember  Hx of infertility: 5 rounds of IVF, IUI  Hx of traumatic birth: Had a traumatic pregnancy and is now just processing everything.  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 Supported by her income and 's        Children- Roxanne (Montserrat)       Living situation- Lives with her , daughter and pets.      Legal- None  Early history/Education- Born and raised in  [place]. An only child. Struggled to have friends because of her upbringing. Did well in school and highest education level is a masters.  Social support-  for about 11 years. Recently moved from [place] . Moved because of 's job.  In-laws that live here. She finds them supportive. Most of her friends are still in [place].  Cultural influences/Impact- None       Feels safe at home- Yes  Family History-  Depression and AUD: Mother      Psychiatric Medication Trials       Drug /  Start Date Dose (mg) Helpful Taken during a  period? Adverse Effects   DC Reason / Date   Prozac  Y   Discontinued due to lack of efficacy   Paxil/ Started many years ago  Was helpful but discontinued when she [place].      Lexapro  Y but wore off      Cymbalta                    Medical / Surgical History                                                                                                                     Patient Active Problem List   Diagnosis    History of gestational diabetes mellitus (GDM)    History of pre-eclampsia    Diminished ovarian reserve    Family history of malignant neoplasm of breast    Gastroesophageal reflux disease, unspecified whether esophagitis present    Major depressive disorder, recurrent episode, moderate (H)    Class 2 severe obesity due to excess calories with serious comorbidity in adult (H)    Mixed hyperlipidemia       Past Surgical History:   Procedure Laterality Date     SECTION      OTHER SURGICAL HISTORY      Uterine polyp removal, hysteroscopy        Medical Review of Systems                                                                                       2,10   none in addition to that documented above  Allergy                                Patient has no known allergies.  Current Medications                                                                                                         Current Outpatient Medications  "  Medication Sig Dispense Refill    APRI 0.15-30 MG-MCG tablet Take 1 tablet by mouth daily at 2 pm.      buPROPion (WELLBUTRIN XL) 300 MG 24 hr tablet Take 1 tablet (300 mg) by mouth every morning. 30 tablet 1    calcium carbonate (TUMS) 500 MG chewable tablet Take 1 chew tab by mouth daily as needed for heartburn      famotidine (PEPCID) 20 MG tablet Take 1 tablet (20 mg) by mouth 2 times daily as needed (heartburn) 60 tablet 1    gabapentin (NEURONTIN) 100 MG capsule Take 1-3 capsules (100-300 mg) by mouth nightly as needed for other (sleep) 90 capsule 1    triamcinolone (KENALOG) 0.1 % external cream Apply topically daily as needed for irritation       Vitals                                                                                             There were no vitals taken for this visit.   Mental Status Exam                                                                            9, 14 cog gs   Alertness: alert  and oriented  Appearance: well groomed  Behavior/Demeanor: cooperative, with good  eye contact   Speech: regular rate and rhythm  Language:  Fluent in English  Psychomotor: normal or unremarkable  Mood:  \"better\"  Affect: appropriate; was congruent to mood; was congruent to content  Thought Process/Associations:  linear and logical  Thought Content:  Reports none;  Denies suicidal ideation and violent ideation  Perception:  Reports none;  Denies auditory hallucinations and visual hallucinations  Insight: good  Judgment: good  Cognition: (6) does  appear grossly intact; formal cognitive testing was not done  Gait and Station:  N/A ([facility])     Labs and Data         7/26/2024    11:10 AM 9/4/2024    12:27 PM 9/25/2024    11:49 AM   PHQ   PHQ-9 Total Score 4 6 4   Q9: Thoughts of better off dead/self-harm past 2 weeks Not at all  Not at all  Not at all        Patient-reported             5/29/2024     8:26 AM 9/4/2024    12:30 PM 9/25/2024    11:50 AM   PROMIS-10 Total Score w/o Sub Scores "   PROMIS TOTAL - SUBSCORES 20 24 32         5/29/2024     8:26 AM   CAGE-AID Total Score   Total Score 2   Total Score MyChart 2 (A total score of 2 or greater is considered clinically significant)         7/26/2024    11:10 AM 9/4/2024    12:27 PM 9/25/2024    11:49 AM   PHQ-9 SCORE   PHQ-9 Total Score MyChart 4 (Minimal depression) 6 (Mild depression) 4 (Minimal depression)   PHQ-9 Total Score 4 6 4         4/9/2024     2:41 PM 5/30/2024     1:27 PM 9/4/2024    12:28 PM   ALLI-7 SCORE   Total Score 7 (mild anxiety) 4 (minimal anxiety) 9 (mild anxiety)   Total Score 7 4 9       Liver/Kidney Function, TSH Metabolic Blood counts   No lab results found.  Recent Labs   Lab Test 05/26/24  1027   TSH 2.00    Recent Labs   Lab Test 01/22/24  0818   CHOL 212*   TRIG 256*   *   HDL 44*     No lab results found.  No lab results found. Recent Labs   Lab Test 01/22/24  0818   WBC 6.3   HGB 13.3   HCT 40.3   MCV 88              ECG N/A QTc = N/Ams      PROVIDER: Tolulope Oluwadamilola Odebunmi, MD    Level of Medical Decision Making:   - At least 2 stable chronic problems  - Engaged in prescription drug management during visit (discussed any medication benefits, side effects, alternatives, etc.)         The longitudinal plan of care for MDD was addressed during this visit. Due to the added complexity in care, I will continue to support Collette in the subsequent management of this condition(s) and with the ongoing continuity of care of this condition(s).        Psychiatry Individual Psychotherapy Note   Psychotherapy start time - na AM  Psychotherapy end time - na AM  Date treatment plan last reviewed with patient - 05/29/24  Subjective: This supportive psychotherapy session addressed issues related to goals of therapy and current psychosocial stressors. Patient's reaction: Action in the context of mental status appropriate for ambulatory setting.    Interactive complexity indicated? No  Plan: RTC in timeframe noted  above  Psychotherapy services during this visit included myself and the patient.   Treatment Plan      SYMPTOMS; PROBLEMS   MEASURABLE GOALS;    FUNCTIONAL IMPROVEMENT / GAINS INTERVENTIONS DISCHARGE CRITERIA   Depression: depressed mood, anhedonia, and low energy   reduce depressive symptoms Supportive / psychodynamic marked symptom improvement

## 2024-10-25 NOTE — NURSING NOTE
Current patient location: [address] [place] [address]    Is the patient currently in the state of [place]? YES    Visit mode:VIDEO    If the visit is dropped, the patient can be reconnected by: VIDEO VISIT: Text to cell phone:   Telephone Information:   Mobile [phone]       Will anyone else be joining the visit? NO  (If patient encounters technical issues they should call [phone]    Are changes needed to the allergy or medication list? No    Are refills needed on medications prescribed by this physician? Discuss with provider    Rooming Documentation:  Questionnaire(s) completed    Reason for visit: RECHECK    Jil FRIEDMAN

## 2024-12-23 ENCOUNTER — TELEPHONE (OUTPATIENT)
Dept: PSYCHIATRY | Facility: CLINIC | Age: 34
End: 2024-12-23
Payer: COMMERCIAL

## 2024-12-23 NOTE — TELEPHONE ENCOUNTER
Received immunization record from [facility]. Patient received Influenza (MDCK, Trivalent) vaccine on 12/19/2024    Site: IM / LD   Dose: .50 mL   Lot #: [id number]   : [employer]   Expiration: 6/30/2025    Form was sent to HIM for scanning and will be held in nurse triage office until scanning is confirmed.     Yasmin Del Valle, EMT

## 2025-01-03 ENCOUNTER — MYC REFILL (OUTPATIENT)
Dept: PSYCHIATRY | Facility: CLINIC | Age: 35
End: 2025-01-03
Payer: COMMERCIAL

## 2025-01-03 DIAGNOSIS — F33.1 MAJOR DEPRESSIVE DISORDER, RECURRENT EPISODE, MODERATE (H): ICD-10-CM

## 2025-01-03 DIAGNOSIS — F41.1 GAD (GENERALIZED ANXIETY DISORDER): ICD-10-CM

## 2025-01-04 ENCOUNTER — MYC REFILL (OUTPATIENT)
Dept: OBGYN | Facility: CLINIC | Age: 35
End: 2025-01-04
Payer: COMMERCIAL

## 2025-01-04 DIAGNOSIS — N95.1 PERIMENOPAUSE: ICD-10-CM

## 2025-01-05 ENCOUNTER — MYC MEDICAL ADVICE (OUTPATIENT)
Dept: PSYCHIATRY | Facility: CLINIC | Age: 35
End: 2025-01-05
Payer: COMMERCIAL

## 2025-01-05 DIAGNOSIS — F33.1 MAJOR DEPRESSIVE DISORDER, RECURRENT EPISODE, MODERATE (H): ICD-10-CM

## 2025-01-05 DIAGNOSIS — F41.1 GAD (GENERALIZED ANXIETY DISORDER): ICD-10-CM

## 2025-01-06 ENCOUNTER — TELEPHONE (OUTPATIENT)
Dept: PSYCHIATRY | Facility: CLINIC | Age: 35
End: 2025-01-06
Payer: COMMERCIAL

## 2025-01-06 RX ORDER — GABAPENTIN 100 MG/1
100-300 CAPSULE ORAL
Qty: 90 CAPSULE | Refills: 1 | Status: SHIPPED | OUTPATIENT
Start: 2025-01-06

## 2025-01-06 RX ORDER — VILAZODONE HYDROCHLORIDE 20 MG/1
20 TABLET ORAL DAILY
Qty: 30 TABLET | Refills: 1 | Status: SHIPPED | OUTPATIENT
Start: 2025-01-06 | End: 2025-01-08

## 2025-01-06 NOTE — TELEPHONE ENCOUNTER
[facility] Call Center    Phone Message    May a detailed message be left on voicemail: yes     Reason for Call: Medication Refill Request    Has the patient contacted the pharmacy for the refill? Yes   Name of medication being requested: Vilazodone 20mg   Provider who prescribed the medication: Dr. Woodard   Pharmacy:   [facility] [id number] [facility] - [place], [place] - [address]   Date medication is needed: Patient left a voicemail over the weekend saying that she's been completely out of her meds for the last two days. She wants a refill before she has to go into work today.    Action Taken: Other: [facility] Psychiatry Nurse Pool     Travel Screening: Not Applicable     Date of Service: 1/6/2025

## 2025-01-06 NOTE — TELEPHONE ENCOUNTER
Last seen: 10/25/24  RTC: 8 weeks  Cancel: 1/3/25  No-show: none  Next appt: 2/5/25      Medication requested:   Pending Prescriptions:                       Disp   Refills    vilazodone (VIIBRYD) 20 MG TABS tablet    30 tab*1            Sig: Take 1 tablet (20 mg) by mouth daily.      From chart note:   - Continue Viibryd 20mg daily        Medication unable to be refilled by RN due to criteria not met as indicated.                 []Eligibility - not seen in the last year              []Supervision - no future appointment              []Compliance - no shows, cancellations or lapse in therapy              []Verification - order discrepancy              []Controlled medication              []Medication not included in policy              []90-day supply request              [x]Other:  patient off medication for two days

## 2025-01-08 RX ORDER — BUPROPION HYDROCHLORIDE 300 MG/1
300 TABLET ORAL EVERY MORNING
Qty: 90 TABLET | Refills: 0 | Status: SHIPPED | OUTPATIENT
Start: 2025-01-08

## 2025-01-08 RX ORDER — VILAZODONE HYDROCHLORIDE 20 MG/1
20 TABLET ORAL DAILY
Qty: 90 TABLET | Refills: 0 | Status: SHIPPED | OUTPATIENT
Start: 2025-01-08

## 2025-01-08 NOTE — TELEPHONE ENCOUNTER
Last seen: 10/25/24  RTC: 8 weeks  Cancel: 1/3/25  No-show: none  Next appt: 2/5/25      Medication requested:   Pending Prescriptions:                       Disp   Refills    buPROPion (WELLBUTRIN XL) 300 MG 24 hr ta*90 tab*0            Sig: Take 1 tablet (300 mg) by mouth every morning.    vilazodone (VIIBRYD) 20 MG TABS tablet    90 tab*0            Sig: Take 1 tablet (20 mg) by mouth daily.        From chart note:   - Continue Viibryd 20mg daily  - Continue Wellbutrin XR 300mg daily  -Continue Gabapentin 100-300mg at bedtime PRN (can take 100mg during the day as needed)      Medication unable to be refilled by RN due to criteria not met as indicated.                 []Eligibility - not seen in the last year              []Supervision - no future appointment              []Compliance - no shows, cancellations or lapse in therapy              []Verification - order discrepancy              []Controlled medication              []Medication not included in policy              [x]90-day supply request              []Other:

## 2025-02-04 NOTE — PROGRESS NOTES
Virtual Visit Details    Type of service:  Video Visit     Originating Location (pt. Location): Home    Distant Location (provider location):  Off-site  Platform used for Video Visit: [facility]  MEDICAL PROGRESS NOTE     Collette Boss is a  34 year old  referred by Yesy Simeon for evaluation of mental health..    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    Today, Collette continues to reports improvement in mood with her current medication regimen. Started a part-time job which she likes and has been able to build community. Has noticed some increased anxiety that's mostly situation but is able to apply effective coping techniques. We will keep medications at current doses and follow up again in 2 months. No SI, HI, or acute mental safety concerns.    Safety Risk-Collette did not appear to be an imminent safety risk to self or others.    Psychotropic Drug Interactions:  [PSYCHCLINICDDI]  FLUoxetine may enhance the neuroexcitatory and/or seizure-potentiating effect of BuPROPion. BuPROPion may increase the serum concentration of FLUoxetine.   BuPROPion may enhance the adverse/toxic effect of Vilazodone   CNS Depressants(Gabapentin) may enhance the adverse/toxic effect of Selective Serotonin Reuptake Inhibitors. Specifically, the risk of psychomotor impairment may be enhanced.      Management: limit med redundancy    [place] was checked today: not using controlled substances       Plan     1) Medications:   - Continue Viibryd 20mg daily  - Continue Wellbutrin XR 300mg daily  -Continue Gabapentin 100-300mg at bedtime PRN (can take 100mg during the day as needed)    2) Psychotherapy: Continue individual therapy    3) Next due:  Labs- Routine monitoring is not indicated for current psychotropic medication regimen   EKG- Routine monitoring is not indicated for current psychotropic medication regimen   Rating scales-  PHQ-9, ALLI-7    4) Referrals: Specialty Program- SLP/TRD clinic    5) Other: none    6) Follow-up: Return to clinic in 8 weeks         Pertinent Background                                                   [most recent eval 05/29/24]     Collette first experienced mental health symptoms of anxiety as an undergraduate student. Had a lot of test anxiety with IBS. Started to experience depression 10 years ago. In that period, she lived [place] and drank heavily. She used Paxil which was helpful for some time. When [place] to the [place], was placed on Lexapro which was helpful but got into a terrible work situation and started drinking heavily again.She was placed on a different medication she doesn't remember.       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 Has found community that she finds supportive.  -  is transitioning to a new job which has been stressful and at the same time entails more money. Collette has been considering using the extra income to help with things like meal planning and creating more structure.  is onboard.  -Bond with daughter is getting better and feels like all that happened in the postpartum is starting to fell better.    Mood  -Less sad and depressed. Thinks Wellbutrin has  helped with energy which has enabled her to do more things she likes to do.  -Anxiety is higher but thinks its situational based on all that's going on.  -Feels like the medications are doing what they are supposed to do.  - Sometimes diving into past traumatic experiences in therapy feels like a lot but she's interested in doing the work.  -Still interested in SLP/TRD referral so number was provided.    Sleep: Gabapentin has been helpful for this. Taking it as needed.        Recent Substance Use  Alcohol: Hasn't had any for the past few months    Reproductive Plans: Not planning to get pregnant and feels relief about her decision. It took some time for and her and her  to reach the decision and she has been able to process it in therapy.    Important Summary Points & Treatment Events   24: Established care  24: Started Prozac taper. Start Wellbutrin. Continue PRN Gabapentin started by OB.  24: Pause Prozac taper. Continue dose as 60mg.  7/10/24: Continue Prozac taper. Reduce dose to 40mg and increase Wellbutrin to 300mg.  24: Hold off on Prozac taper.  24: Reduce Prozac to 20mg for 2weeks, then stop. Start Viibryd for depression and anxiety. Encouraged to use PRN Gabapentin prescribed by OB during the day for daytime anxiety.  24: completed Prozac taper. Starting to notice efficacy on Viibryd. TMS referral sent.     Mood & Anxiety Disorder (PMAD) History   Hx of  mood or anxiety disorder: Worsened depression while pregnant. Dose of antidepressant was increased to 80mg  Hx of  psychosis: N.A  Hx premenstrual mood/anxiety problems: Experienced dips in mood leading up to her period.   Hx mood symptoms while taking hormonal birth control: On birth control in college. Doesn't remember  Hx of infertility: 5 rounds of IVF, IUI  Hx of traumatic birth: Had a traumatic pregnancy and is now just processing everything.  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 Supported by her income and 's        Children- Roxanne Sutton)       Living situation- Lives with her , daughter and pets.      Legal- None  Early history/Education- Born and raised in [place]. An only child. Struggled to have friends because of her upbringing. Did well in school and highest education level is a masters.  Social support-  for about 11 years. Recently moved from [place] . Moved because of 's job.  In-laws that live here. She finds them supportive. Most of her friends are still in [place].  Cultural influences/Impact- None       Feels safe at home- Yes  Family History-  Depression and AUD: Mother      Psychiatric Medication Trials       Drug /  Start Date Dose (mg) Helpful Taken during a  period? Adverse Effects   DC Reason / Date   Prozac  Y   Discontinued due to lack of efficacy   Paxil/ Started many years ago  Was helpful but discontinued when she moved back to the .      Lexapro  Y but wore off      Cymbalta                    Medical / Surgical History                                                                                                                     Patient Active Problem List   Diagnosis    History of gestational diabetes mellitus (GDM)    History of pre-eclampsia    Diminished ovarian reserve    Family history of malignant neoplasm of breast    Gastroesophageal reflux disease, unspecified whether esophagitis present    Major depressive disorder, recurrent episode, moderate (H)    Class 2 severe obesity due to excess  "calories with serious comorbidity in adult (H)    Mixed hyperlipidemia       Past Surgical History:   Procedure Laterality Date     SECTION      OTHER SURGICAL HISTORY      Uterine polyp removal, hysteroscopy        Medical Review of Systems                                                                                       2,10   none in addition to that documented above  Allergy                                Patient has no known allergies.  Current Medications                                                                                                         Current Outpatient Medications   Medication Sig Dispense Refill    APRI 0.15-30 MG-MCG tablet Take 1 tablet by mouth daily at 2 pm.      buPROPion (WELLBUTRIN XL) 300 MG 24 hr tablet Take 1 tablet (300 mg) by mouth every morning. 90 tablet 0    calcium carbonate (TUMS) 500 MG chewable tablet Take 1 chew tab by mouth daily as needed for heartburn      famotidine (PEPCID) 20 MG tablet Take 1 tablet (20 mg) by mouth 2 times daily as needed (heartburn) 60 tablet 1    gabapentin (NEURONTIN) 100 MG capsule Take 1-3 capsules (100-300 mg) by mouth nightly as needed for other (sleep). 90 capsule 1    triamcinolone (KENALOG) 0.1 % external cream Apply topically daily as needed for irritation      vilazodone (VIIBRYD) 20 MG TABS tablet Take 1 tablet (20 mg) by mouth daily. 90 tablet 0     Vitals                                                                                             There were no vitals taken for this visit.   Mental Status Exam                                                                            9, 14 cog gs   Alertness: alert  and oriented  Appearance: well groomed  Behavior/Demeanor: cooperative, with good  eye contact   Speech: regular rate and rhythm  Language:  Fluent in English  Psychomotor: normal or unremarkable  Mood:  \"better\"  Affect: appropriate; was congruent to mood; was congruent to content  Thought " Process/Associations:  linear and logical  Thought Content:  Reports none;  Denies suicidal ideation and violent ideation  Perception:  Reports none;  Denies auditory hallucinations and visual hallucinations  Insight: good  Judgment: good  Cognition: (6) does  appear grossly intact; formal cognitive testing was not done  Gait and Station:  N/A (formerly [facility])     Labs and Data         9/4/2024    12:27 PM 9/25/2024    11:49 AM 2/5/2025     3:18 PM   PHQ   PHQ-9 Total Score 6 4 3    Q9: Thoughts of better off dead/self-harm past 2 weeks Not at all Not at all Not at all       Patient-reported     Answers submitted by the patient for this visit:  Patient Health Questionnaire (Submitted on 2/5/2025)  If you checked off any problems, how difficult have these problems made it for you to do your work, take care of things at home, or get along with other people?: Somewhat difficult  PHQ9 TOTAL SCORE: 3  Patient Health Questionnaire (G7) (Submitted on 2/5/2025)  ALLI 7 TOTAL SCORE: 6          9/4/2024    12:30 PM 9/25/2024    11:50 AM 2/5/2025     3:20 PM   PROMIS-10 Total Score w/o Sub Scores   PROMIS TOTAL - SUBSCORES 24 32 29         5/29/2024     8:26 AM   CAGE-AID Total Score   Total Score 2   Total Score MyChart 2 (A total score of 2 or greater is considered clinically significant)         9/4/2024    12:27 PM 9/25/2024    11:49 AM 2/5/2025     3:18 PM   PHQ-9 SCORE   PHQ-9 Total Score MyChart 6 (Mild depression) 4 (Minimal depression) 3 (Minimal depression)   PHQ-9 Total Score 6 4 3        Patient-reported         5/30/2024     1:27 PM 9/4/2024    12:28 PM 2/5/2025     3:19 PM   ALLI-7 SCORE   Total Score 4 (minimal anxiety) 9 (mild anxiety) 6 (mild anxiety)   Total Score 4 9 6        Patient-reported       Liver/Kidney Function, TSH Metabolic Blood counts   No lab results found.  Recent Labs   Lab Test 05/26/24  1027   TSH 2.00    Recent Labs   Lab Test 01/22/24  0818   CHOL 212*   TRIG 256*   *   HDL 44*     No  lab results found.  No lab results found. Recent Labs   Lab Test 01/22/24  0818   WBC 6.3   HGB 13.3   HCT 40.3   MCV 88              ECG N/A QTc = N/Ams      PROVIDER: Tolulope Oluwadamilola Odebunmi, MD    Level of Medical Decision Making:   - At least 2 stable chronic problems  - Engaged in prescription drug management during visit (discussed any medication benefits, side effects, alternatives, etc.)         The longitudinal plan of care for MDD was addressed during this visit. Due to the added complexity in care, I will continue to support Collette in the subsequent management of this condition(s) and with the ongoing continuity of care of this condition(s).        Psychiatry Individual Psychotherapy Note   Psychotherapy start time - 3: 35 PM  Psychotherapy end time - 3:51 PM  Date treatment plan last reviewed with patient - 05/29/24  Subjective: This supportive psychotherapy session addressed issues related to goals of therapy and current psychosocial stressors. Patient's reaction: Action in the context of mental status appropriate for ambulatory setting.    Interactive complexity indicated? No  Plan: RTC in timeframe noted above  Psychotherapy services during this visit included myself and the patient.   Treatment Plan      SYMPTOMS; PROBLEMS   MEASURABLE GOALS;    FUNCTIONAL IMPROVEMENT / GAINS INTERVENTIONS DISCHARGE CRITERIA   Depression: depressed mood, anhedonia, and low energy   reduce depressive symptoms Supportive / psychodynamic marked symptom improvement

## 2025-02-05 ENCOUNTER — VIRTUAL VISIT (OUTPATIENT)
Dept: PSYCHIATRY | Facility: CLINIC | Age: 35
End: 2025-02-05
Attending: STUDENT IN AN ORGANIZED HEALTH CARE EDUCATION/TRAINING PROGRAM
Payer: COMMERCIAL

## 2025-02-05 DIAGNOSIS — F41.1 GAD (GENERALIZED ANXIETY DISORDER): ICD-10-CM

## 2025-02-05 DIAGNOSIS — F33.1 MAJOR DEPRESSIVE DISORDER, RECURRENT EPISODE, MODERATE (H): Primary | ICD-10-CM

## 2025-02-05 ASSESSMENT — ANXIETY QUESTIONNAIRES
4. TROUBLE RELAXING: SEVERAL DAYS
GAD7 TOTAL SCORE: 6
3. WORRYING TOO MUCH ABOUT DIFFERENT THINGS: SEVERAL DAYS
6. BECOMING EASILY ANNOYED OR IRRITABLE: SEVERAL DAYS
2. NOT BEING ABLE TO STOP OR CONTROL WORRYING: SEVERAL DAYS
GAD7 TOTAL SCORE: 6
5. BEING SO RESTLESS THAT IT IS HARD TO SIT STILL: NOT AT ALL
7. FEELING AFRAID AS IF SOMETHING AWFUL MIGHT HAPPEN: NOT AT ALL
1. FEELING NERVOUS, ANXIOUS, OR ON EDGE: MORE THAN HALF THE DAYS
IF YOU CHECKED OFF ANY PROBLEMS ON THIS QUESTIONNAIRE, HOW DIFFICULT HAVE THESE PROBLEMS MADE IT FOR YOU TO DO YOUR WORK, TAKE CARE OF THINGS AT HOME, OR GET ALONG WITH OTHER PEOPLE: SOMEWHAT DIFFICULT
GAD7 TOTAL SCORE: 6
8. IF YOU CHECKED OFF ANY PROBLEMS, HOW DIFFICULT HAVE THESE MADE IT FOR YOU TO DO YOUR WORK, TAKE CARE OF THINGS AT HOME, OR GET ALONG WITH OTHER PEOPLE?: SOMEWHAT DIFFICULT
7. FEELING AFRAID AS IF SOMETHING AWFUL MIGHT HAPPEN: NOT AT ALL

## 2025-02-05 ASSESSMENT — PAIN SCALES - GENERAL: PAINLEVEL_OUTOF10: NO PAIN (0)

## 2025-02-05 ASSESSMENT — PATIENT HEALTH QUESTIONNAIRE - PHQ9
SUM OF ALL RESPONSES TO PHQ QUESTIONS 1-9: 3
SUM OF ALL RESPONSES TO PHQ QUESTIONS 1-9: 3
10. IF YOU CHECKED OFF ANY PROBLEMS, HOW DIFFICULT HAVE THESE PROBLEMS MADE IT FOR YOU TO DO YOUR WORK, TAKE CARE OF THINGS AT HOME, OR GET ALONG WITH OTHER PEOPLE: SOMEWHAT DIFFICULT

## 2025-02-05 NOTE — PATIENT INSTRUCTIONS
**For crisis resources, please see the information at the end of this document**   Patient Education    Thank you for coming to the [facility].       Lab Testing:  If you had lab testing today and your results are reassuring or normal they will be mailed to you or sent through [employer] within 7 days. If the lab tests need quick action we will call you with the results. The phone number we will call with results is # [phone]. If this is not the best number please call our clinic and change the number.     Medication Refills:  If you need any refills please call your pharmacy and they will contact us. Our fax number for refills is [phone].   Three business days of notice are needed for general medication refill requests.   Five business days of notice are needed for controlled substance refill requests.   If you need to change to a different pharmacy, please contact the new pharmacy directly. The new pharmacy will help you get your medications transferred.     Contact Us:  Please call [phone] during business hours (8-5:00 M-F).   If you have medication related questions after clinic hours, or on the weekend, please call [phone].     Financial Assistance [phone]   Medical Records [phone]     To find additional local resources, refer to Postpartum Support International (PSI). Available at: http://www.postpartum.net/get-help/locations/united-states/    -[facility] at: www.womensmentalhealth.org is a good resource for information about psychiatric medication in pregnancy/lactation    -Mother To Baby A service of the nonprofit Organization of Teratology Information Specialists (SANDY), provides evidence based information online and in printable handouts to provide patients and providers regarding medications and other exposures during pregnancy and lactation Available at: https://mothertobaby.org/fact-sheets-parent/.    -The 4th  "Trimester Project - Expert written resources and information for mothers and families. Available at: https://[employer]/    -Consider using \"The Pregnancy and Postpartum Anxiety Workbook,\" by Catarina Benz PhD and Stef Spence PsyD.    Postpartum Planning Tools:  Maternal Wellbeing Plan from [facility]: https://www.health.[facility].[place]/people/womeninfants/pmad/pmadsfs.html    4th Trimester Postpartum plan: https://[employer]/mypostpartumplan    Tips for partners:  http://www.postpartum.net/family/tips-for-postpartum-dads-and-partners/        MENTAL HEALTH CRISIS RESOURCES:  For a emergency help, please call 911 or go to the nearest Emergency Department.     Emergency Walk-In Options:   EmPATH Unit @ [facility] ([place]): [phone] - Specialized mental health emergency area designed to be calming  [facility] ([place]): [phone]  [facility] ([place]): [phone]  [facility]): [phone]    [facility] Crisis Information:   [place]: [phone]  [place]: [phone]  [place] ([place]) - Adult: [phone]     Child: [phone]  [place] - Adult: [phone]     Child: [phone]  [place]: [phone]  List of all [facility] resources:   https://[place].gov/dhs/people-we-serve/adults/health-care/mental-health/resources/crisis-contacts.jsp    National Crisis Information:   Crisis Text Line: Text  [place]  to 588778  Suicide & Crisis Lifeline: 988  National Suicide Prevention Lifeline: 8-264-453-CLGY (1-273.581.5958)       For online chat options, visit https://suicidepreventionlifeline.org/chat/  Poison Control Center: 1-566.727.2938  Trans Lifeline: 1-583.567.5930 - Hotline for transgender people of all ages  The [employer] Project: 1-383.499.6497 - Hotline for LGBT youth     For Non-Emergency Support:   Fast Tracker: Mental Health & Substance Use Disorder Resources -   https://www.itsDappern.org/        "

## 2025-02-05 NOTE — NURSING NOTE
Current patient location: [address] [place] [address]    Is the patient currently in the state of [place]? YES    Visit mode: VIDEO    If the visit is dropped, the patient can be reconnected by:VIDEO VISIT: Text to cell phone:   Telephone Information:   Mobile [phone]       Will anyone else be joining the visit? NO  (If patient encounters technical issues they should call [phone])    Are changes needed to the allergy or medication list? No    Are refills needed on medications prescribed by this physician? NO    Rooming Documentation:  Patient will complete questionnaire(s) in [facility]    Reason for visit: RECHECK    Hollie QUINNF

## 2025-02-09 ENCOUNTER — MYC MEDICAL ADVICE (OUTPATIENT)
Dept: FAMILY MEDICINE | Facility: CLINIC | Age: 35
End: 2025-02-09
Payer: COMMERCIAL

## 2025-02-09 ENCOUNTER — MYC REFILL (OUTPATIENT)
Dept: FAMILY MEDICINE | Facility: CLINIC | Age: 35
End: 2025-02-09
Payer: COMMERCIAL

## 2025-02-09 DIAGNOSIS — Z30.011 ENCOUNTER FOR PRESCRIPTION OF ORAL CONTRACEPTIVES: Primary | ICD-10-CM

## 2025-02-10 RX ORDER — DESOGESTREL AND ETHINYL ESTRADIOL 0.15-0.03
1 KIT ORAL
Qty: 84 TABLET | Refills: 3 | Status: SHIPPED | OUTPATIENT
Start: 2025-02-10

## 2025-03-16 ENCOUNTER — HEALTH MAINTENANCE LETTER (OUTPATIENT)
Age: 35
End: 2025-03-16

## 2025-04-04 ENCOUNTER — MYC MEDICAL ADVICE (OUTPATIENT)
Dept: FAMILY MEDICINE | Facility: CLINIC | Age: 35
End: 2025-04-04
Payer: COMMERCIAL

## 2025-04-07 NOTE — TELEPHONE ENCOUNTER
Responded to patient via [facility] link provided for an E-visit.    Juan José Andujar RN  [facility]

## 2025-04-08 NOTE — PROGRESS NOTES
Virtual Visit Details    Type of service:  Video Visit     Originating Location (pt. Location): Home    Distant Location (provider location):  Off-site  Platform used for Video Visit: [facility]  MEDICAL PROGRESS NOTE     Collette Boss is a  34 year old  referred by Yesy Simeon for evaluation of mental health..    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    Today, Collette continues to report stable mood with her current medication regimen and starting a grief group. Feeling less isolated with her grief group and the community she's built through her part-time job. She feels happy and proud at the progress she has made in her mental health and parenting. She enjoys her daughter more and feels like she's at a place she hoped she would be as a mother. She's still interested in her TMS consultation coming up next month . No SI, HI, or acute mental safety concerns.    Safety Risk-Collette did not appear to be an imminent safety risk to self or others.    Psychotropic Drug Interactions:  [PSYCHCLINICDDI]  FLUoxetine may enhance the neuroexcitatory and/or seizure-potentiating effect of BuPROPion. BuPROPion may increase the serum concentration of FLUoxetine.   BuPROPion may enhance the adverse/toxic effect of Vilazodone   CNS Depressants(Gabapentin) may enhance the adverse/toxic effect  of Selective Serotonin Reuptake Inhibitors. Specifically, the risk of psychomotor impairment may be enhanced.     Management: limit med redundancy    [place] was checked today: not using controlled substances       Plan     1) Medications:   - Continue Viibryd 20mg daily  - Continue Wellbutrin XR 300mg daily  - Continue Gabapentin 100-300mg at bedtime PRN (can take 100mg during the day as needed)    2) Psychotherapy: Continue individual therapy    3) Next due:  Labs- Routine monitoring is not indicated for current psychotropic medication regimen   EKG- Routine monitoring is not indicated for current psychotropic medication regimen   Rating scales-  PHQ-9, ALLI-7    4) Referrals: Specialty Program- SLP/TRD clinic    5) Other: none    6) Follow-up: Return to clinic in 9-11 weeks         Pertinent Background                                                   [most recent eval 05/29/24]     Collette first experienced mental health symptoms of anxiety as an undergraduate student. Had a lot of test anxiety with IBS. Started to experience depression 10 years ago(2014). In that period, she lived [place] and drank heavily. She used Paxil which was helpful for some time. When [place] to the [place], was placed on Lexapro which was helpful but got into a terrible work situation and started drinking heavily again. She was placed on a different medication she doesn't remember.     Pertinent items include: SIB, hx of depression, alcohol use as a way to cope        Interim History     Since last visit:  - Going to a grief group that she found helpful.It's run by her therapist so that's been wonderful for her.  - Feels happy about where she's at compared to a year ago, Enjoys spending time with Montserrat and feels happy about her bond with her.  - Continues to find efficacy with current dose of medication. Interested in her upcoming TMS consultation and its consideration as an additional intervention modality for her.        Recent Substance  Use  Alcohol: Hasn't had any for the past few months    Reproductive Plans: Not planning to get pregnant and feels relief about her decision. It took some time for and her and her  to reach the decision and she has been able to process it in therapy.    Important Summary Points & Treatment Events   24: Established care  24: Started Prozac taper. Start Wellbutrin. Continue PRN Gabapentin started by OB.  24: Pause Prozac taper due to side effects. Continue dose as 60mg.  7/10/24: Continue Prozac taper. Reduce dose to 40mg and increase Wellbutrin to 300mg.  24: Hold off on Prozac taper.  24: Reduce Prozac to 20mg for 2weeks, then stop. Start Viibryd for depression and anxiety. Encouraged to use PRN Gabapentin prescribed by OB during the day for daytime anxiety.  24: completed Prozac taper. Starting to notice efficacy on Viibryd. TMS referral sent.     Mood & Anxiety Disorder (PMAD) History   Hx of  mood or anxiety disorder: Worsened depression while pregnant. Dose of antidepressant was increased to 80mg  Hx of  psychosis: N.A  Hx premenstrual mood/anxiety problems: Experienced dips in mood leading up to her period.   Hx mood symptoms while taking hormonal birth control: On birth control in college. Doesn't remember  Hx of infertility: 5 rounds of IVF, IUI  Hx of traumatic birth: Had a traumatic pregnancy and is now just processing everything.  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 Supported by her income and 's        Children- Roxanne  (Montserrat)       Living situation- Lives with her , daughter and pets.      Legal- None  Early history/Education- Born and raised in [place]. An only child. Struggled to have friends because of her upbringing. Did well in school and highest education level is a masters.  Social support-  for about 11 years. Recently moved from [place] . Moved because of 's job.  In-laws that live here. She finds them supportive. Most of her friends are still in [place].  Cultural influences/Impact- None       Feels safe at home- Yes  Family History-  Depression and AUD: Mother      Psychiatric Medication Trials       Drug /  Start Date Dose (mg) Helpful Taken during a  period? Adverse Effects   DC Reason / Date   Prozac  Y   Discontinued due to lack of efficacy   Paxil/ Started many years ago  Was helpful but discontinued when she moved back to the .      Lexapro  Y but wore off      Cymbalta                    Medical / Surgical History                                                                                                                     Patient Active Problem List   Diagnosis    History of gestational diabetes mellitus (GDM)    History of pre-eclampsia    Diminished ovarian reserve    Family history of malignant neoplasm of breast    Gastroesophageal reflux disease, unspecified whether esophagitis present    Major depressive disorder, recurrent episode, moderate (H)    Class 2 severe obesity due to excess calories with serious comorbidity in adult (H)    Mixed hyperlipidemia       Past Surgical History:   Procedure Laterality Date     SECTION      OTHER SURGICAL HISTORY      Uterine polyp removal, hysteroscopy        Medical Review of Systems                                                                                       2,10   none in addition to that documented above  Allergy                                Patient has no known allergies.  Current Medications             "                                                                                             Current Outpatient Medications   Medication Sig Dispense Refill    APRI 0.15-30 MG-MCG tablet Take 1 tablet by mouth daily at 2 pm. 84 tablet 3    buPROPion (WELLBUTRIN XL) 300 MG 24 hr tablet Take 1 tablet (300 mg) by mouth every morning. 90 tablet 0    calcium carbonate (TUMS) 500 MG chewable tablet Take 1 chew tab by mouth daily as needed for heartburn      famotidine (PEPCID) 20 MG tablet Take 1 tablet (20 mg) by mouth 2 times daily as needed (heartburn) 60 tablet 1    gabapentin (NEURONTIN) 100 MG capsule Take 1-3 capsules (100-300 mg) by mouth nightly as needed for other (sleep). 90 capsule 1    triamcinolone (KENALOG) 0.1 % external cream Apply topically daily as needed for irritation      vilazodone (VIIBRYD) 20 MG TABS tablet Take 1 tablet (20 mg) by mouth daily. 90 tablet 0     Vitals                                                                                             There were no vitals taken for this visit.   Mental Status Exam                                                                            9, 14 cog gs   Alertness: alert  and oriented  Appearance: well groomed  Behavior/Demeanor: cooperative, with good  eye contact   Speech: regular rate and rhythm  Language:  Fluent in English  Psychomotor: normal or unremarkable  Mood:  \"doing okay\"  Affect: appropriate; was congruent to mood; was congruent to content  Thought Process/Associations:  linear and logical  Thought Content:  Reports none;  Denies suicidal ideation and violent ideation  Perception:  Reports none;  Denies auditory hallucinations and visual hallucinations  Insight: good  Judgment: good  Cognition: (6) does  appear grossly intact; formal cognitive testing was not done  Gait and Station:  N/A ([facility])     Labs and Data         9/25/2024    11:49 AM 2/5/2025     3:18 PM 4/9/2025     2:34 PM   PHQ   PHQ-9 Total Score 4 3  2    Q9: " Thoughts of better off dead/self-harm past 2 weeks Not at all Not at all Not at all       Patient-reported     Answers submitted by the patient for this visit:  Patient Health Questionnaire (Submitted on 4/9/2025)  If you checked off any problems, how difficult have these problems made it for you to do your work, take care of things at home, or get along with other people?: Somewhat difficult  PHQ9 TOTAL SCORE: 2          9/4/2024    12:30 PM 9/25/2024    11:50 AM 2/5/2025     3:20 PM   PROMIS-10 Total Score w/o Sub Scores   PROMIS TOTAL - SUBSCORES 24 32 29         5/29/2024     8:26 AM   CAGE-AID Total Score   Total Score 2   Total Score MyChart 2 (A total score of 2 or greater is considered clinically significant)         9/25/2024    11:49 AM 2/5/2025     3:18 PM 4/9/2025     2:34 PM   PHQ-9 SCORE   PHQ-9 Total Score MyChart 4 (Minimal depression) 3 (Minimal depression) 2 (Minimal depression)   PHQ-9 Total Score 4 3  2        Patient-reported         5/30/2024     1:27 PM 9/4/2024    12:28 PM 2/5/2025     3:19 PM   ALLI-7 SCORE   Total Score 4 (minimal anxiety) 9 (mild anxiety) 6 (mild anxiety)   Total Score 4 9 6        Patient-reported       Liver/Kidney Function, TSH Metabolic Blood counts   No lab results found.  Recent Labs   Lab Test 05/26/24  1027   TSH 2.00    Recent Labs   Lab Test 01/22/24  0818   CHOL 212*   TRIG 256*   *   HDL 44*     No lab results found.  No lab results found. Recent Labs   Lab Test 01/22/24  0818   WBC 6.3   HGB 13.3   HCT 40.3   MCV 88              ECG N/A QTc = N/Ams      PROVIDER: Tolulope Oluwadamilola Odebunmi, MD    Level of Medical Decision Making:   - At least 2 stable chronic problems  - Engaged in prescription drug management during visit (discussed any medication benefits, side effects, alternatives, etc.)         The longitudinal plan of care for MDD was addressed during this visit. Due to the added complexity in care, I will continue to support Collette in  the subsequent management of this condition(s) and with the ongoing continuity of care of this condition(s).        Psychiatry Individual Psychotherapy Note   Psychotherapy start time - NA PM  Psychotherapy end time - NA PM  Date treatment plan last reviewed with patient - 05/29/24  Subjective: This supportive psychotherapy session addressed issues related to goals of therapy and current psychosocial stressors. Patient's reaction: Action in the context of mental status appropriate for ambulatory setting.    Interactive complexity indicated? No  Plan: RTC in timeframe noted above  Psychotherapy services during this visit included myself and the patient.   Treatment Plan      SYMPTOMS; PROBLEMS   MEASURABLE GOALS;    FUNCTIONAL IMPROVEMENT / GAINS INTERVENTIONS DISCHARGE CRITERIA   Depression: depressed mood, anhedonia, and low energy   reduce depressive symptoms Supportive / psychodynamic marked symptom improvement

## 2025-04-09 ENCOUNTER — VIRTUAL VISIT (OUTPATIENT)
Dept: PSYCHIATRY | Facility: CLINIC | Age: 35
End: 2025-04-09
Attending: STUDENT IN AN ORGANIZED HEALTH CARE EDUCATION/TRAINING PROGRAM
Payer: COMMERCIAL

## 2025-04-09 DIAGNOSIS — F41.1 GAD (GENERALIZED ANXIETY DISORDER): ICD-10-CM

## 2025-04-09 DIAGNOSIS — F33.0 MILD EPISODE OF RECURRENT MAJOR DEPRESSIVE DISORDER: ICD-10-CM

## 2025-04-09 RX ORDER — GABAPENTIN 100 MG/1
100-300 CAPSULE ORAL
Qty: 90 CAPSULE | Refills: 1 | Status: SHIPPED | OUTPATIENT
Start: 2025-04-09

## 2025-04-09 RX ORDER — BUPROPION HYDROCHLORIDE 300 MG/1
300 TABLET ORAL EVERY MORNING
Qty: 90 TABLET | Refills: 0 | Status: SHIPPED | OUTPATIENT
Start: 2025-04-09

## 2025-04-09 RX ORDER — VILAZODONE HYDROCHLORIDE 20 MG/1
20 TABLET ORAL DAILY
Qty: 90 TABLET | Refills: 0 | Status: SHIPPED | OUTPATIENT
Start: 2025-04-09

## 2025-04-09 ASSESSMENT — PATIENT HEALTH QUESTIONNAIRE - PHQ9
SUM OF ALL RESPONSES TO PHQ QUESTIONS 1-9: 2
10. IF YOU CHECKED OFF ANY PROBLEMS, HOW DIFFICULT HAVE THESE PROBLEMS MADE IT FOR YOU TO DO YOUR WORK, TAKE CARE OF THINGS AT HOME, OR GET ALONG WITH OTHER PEOPLE: SOMEWHAT DIFFICULT
SUM OF ALL RESPONSES TO PHQ QUESTIONS 1-9: 2

## 2025-04-09 ASSESSMENT — PAIN SCALES - GENERAL: PAINLEVEL_OUTOF10: NO PAIN (0)

## 2025-04-09 NOTE — PATIENT INSTRUCTIONS
**For crisis resources, please see the information at the end of this document**   Patient Education    Thank you for coming to the [facility].       Lab Testing:  If you had lab testing today and your results are reassuring or normal they will be mailed to you or sent through [employer] within 7 days. If the lab tests need quick action we will call you with the results. The phone number we will call with results is # [phone]. If this is not the best number please call our clinic and change the number.     Medication Refills:  If you need any refills please call your pharmacy and they will contact us. Our fax number for refills is [phone].   Three business days of notice are needed for general medication refill requests.   Five business days of notice are needed for controlled substance refill requests.   If you need to change to a different pharmacy, please contact the new pharmacy directly. The new pharmacy will help you get your medications transferred.     Contact Us:  Please call [phone] during business hours (8-5:00 M-F).   If you have medication related questions after clinic hours, or on the weekend, please call [phone].     Financial Assistance [phone]   Medical Records [phone]     To find additional local resources, refer to Postpartum Support International (PSI). Available at: http://www.postpartum.net/get-help/locations/united-states/    -[facility] Women's Mental [facility] at: www.womensmentalhealth.org is a good resource for information about psychiatric medication in pregnancy/lactation    -Mother To Baby A service of the nonprofit Organization of Terat[employer] Information Specialists (SANDY), provides evidence based information online and in printable handouts to provide patients and providers regarding medications and other exposures during pregnancy and lactation Available at: https://mothertobaby.org/fact-sheets-parent/.    -The 4th  "Trimester Project - Expert written resources and information for mothers and families. Available at: https://SocialTagg.Quench/    -Consider using \"The Pregnancy and Postpartum Anxiety Workbook,\" by Catarina Benz PhD and Stef Spence PsyD.    Postpartum Planning Tools:  Maternal Wellbeing Plan from [facility]: https://www.health.[facility].[place]/people/womeninfants/pmad/pmadsfs.html    Tips for partners:  http://www.postpartum.net/family/tips-for-postpartum-dads-and-partners/        MENTAL HEALTH CRISIS RESOURCES:  For a emergency help, please call 911 or go to the nearest Emergency Department.     Emergency Walk-In Options:   EmPATH Unit @ [facility] ([place]): [phone] - Specialized mental health emergency area designed to be calming  [facility] ([place]): [phone]  [facility] ([place]): [phone]  [facility]): [phone]    [facility] Crisis Information:   [place]: [phone]  [place]: [phone]  [place] ([place]) - Adult: [phone]     Child: [phone]  [place] - Adult: [phone]     Child: [phone]  [place]: [phone]  List of all [facility] resources:   https://[place].gov/dhs/people-we-serve/adults/health-care/mental-health/resources/crisis-contacts.jsp    National Crisis Information:   Crisis Text Line: Text  [place]  to 308709  Suicide & Crisis Lifeline: 988  National Suicide Prevention Lifeline: 6-888-804-TALK (1-777.794.4258)       For online chat options, visit https://suicidepreventionlifeline.org/chat/  Poison Control Center: 1-977.982.5642  Trans Lifeline: 1-249.791.2721 - Hotline for transgender people of all ages  The [employer] Project: 1-962.421.8403 - Hotline for LGBT youth     For Non-Emergency Support:   Fast Tracker: Mental Health & Substance Use Disorder Resources -   https://www.Pile[employer] Softwareermn.org/        "

## 2025-04-09 NOTE — NURSING NOTE
Current patient location: [address] [place] [address]    Is the patient currently in the state of [place]? YES    Visit mode: VIDEO    If the visit is dropped, the patient can be reconnected by:VIDEO VISIT: Text to cell phone:   Telephone Information:   Mobile [phone]       Will anyone else be joining the visit? NO  (If patient encounters technical issues they should call [phone]    Are changes needed to the allergy or medication list? No    Are refills needed on medications prescribed by this physician? NO    Rooming Documentation:  Questionnaire(s) completed    Reason for visit: RECHALENA QUINNF

## 2025-04-27 ENCOUNTER — NURSE TRIAGE (OUTPATIENT)
Dept: FAMILY MEDICINE | Facility: CLINIC | Age: 35
End: 2025-04-27

## 2025-04-28 ENCOUNTER — OFFICE VISIT (OUTPATIENT)
Dept: FAMILY MEDICINE | Facility: CLINIC | Age: 35
End: 2025-04-28
Payer: COMMERCIAL

## 2025-04-28 VITALS
HEIGHT: 67 IN | SYSTOLIC BLOOD PRESSURE: 122 MMHG | TEMPERATURE: 98.3 F | HEART RATE: 92 BPM | DIASTOLIC BLOOD PRESSURE: 75 MMHG | BODY MASS INDEX: 36.1 KG/M2 | RESPIRATION RATE: 16 BRPM | OXYGEN SATURATION: 97 % | WEIGHT: 230 LBS

## 2025-04-28 DIAGNOSIS — J02.9 SORE THROAT: ICD-10-CM

## 2025-04-28 DIAGNOSIS — D22.9 BENIGN NEVUS: ICD-10-CM

## 2025-04-28 DIAGNOSIS — J06.9 VIRAL URI WITH COUGH: Primary | ICD-10-CM

## 2025-04-28 DIAGNOSIS — Z20.818 STREP THROAT EXPOSURE: ICD-10-CM

## 2025-04-28 LAB
DEPRECATED S PYO AG THROAT QL EIA: NEGATIVE
S PYO DNA THROAT QL NAA+PROBE: NOT DETECTED

## 2025-04-28 PROCEDURE — 87651 STREP A DNA AMP PROBE: CPT | Performed by: FAMILY MEDICINE

## 2025-04-28 PROCEDURE — 3074F SYST BP LT 130 MM HG: CPT | Performed by: FAMILY MEDICINE

## 2025-04-28 PROCEDURE — 99214 OFFICE O/P EST MOD 30 MIN: CPT | Performed by: FAMILY MEDICINE

## 2025-04-28 PROCEDURE — 3078F DIAST BP <80 MM HG: CPT | Performed by: FAMILY MEDICINE

## 2025-04-28 NOTE — PATIENT INSTRUCTIONS
Try cetirizine (Zyrtec) 5 mg per day. Can help dry up nasal secretions.     Sudafed (pseudoephedrine). Decongestant.     Try nasal saline rinses (Neti Pot). Use distilled water.     Try ibuprofen for headaches 400 mg up to every 4 hours. You can continue Excedrin as well.

## 2025-04-28 NOTE — PROGRESS NOTES
"  Assessment & Plan     Viral URI with cough: discussed conservative treatment measures. Lungs are clear O2 sats 97%, do not suspect pneumonia.     Sore throat: rapid strep negative. Discussed cleaning after strep infection (throwing away daughter's tooth brush, cleaning bedding and stuffed animals, etc)  - Streptococcus A Rapid Screen w/Reflex to PCR - Clinic Collect  - Group A Streptococcus PCR Throat Swab    Strep throat exposure  - Streptococcus A Rapid Screen w/Reflex to PCR - Clinic Collect  - Group A Streptococcus PCR Throat Swab    Benign nevus: appears to be benign flesh-colored nevus, though may be a skin tag. Monitor but do not suspect anything worrisome based on today's exam            BMI  Estimated body mass index is 36.1 kg/m  as calculated from the following:    Height as of this encounter: 1.7 m (5' 6.93\").    Weight as of this encounter: 104.3 kg (230 lb).           Tila Murdock is a 34 year old, presenting for the following health issues:  Pharyngitis (Daughter was diagnosed with Scarlet fever /)      4/28/2025    12:54 PM   Additional Questions   Roomed by Tiffani BYERS      No sore throat but daughter has strep throat right now so she would like to be checked.     Has had a cough and on and off headache in the front of her head for the past 8 days. Lost voice 2 days earlier this week. Last night the cough was so bad she couldn't sleep. Headache: dull, foggy. Has taken 2 doses of excedrin that has helped with the headache.     Skin tag/mole on back of neck. Just noticed this a couple of days ago.       Objective    /75   Pulse 92   Temp 98.3  F (36.8  C) (Oral)   Resp 16   Ht 1.7 m (5' 6.93\")   Wt 104.3 kg (230 lb)   SpO2 97%   BMI 36.10 kg/m    Body mass index is 36.1 kg/m .  Physical Exam   GENERAL: alert and no distress  HENT: ear canals and TM's normal, nose and mouth without ulcers or lesions.    NECK: no adenopathy, no asymmetry, masses, or scars. Oropharynx clear, " slightly red but no exudates.  RESP: lungs clear to auscultation - no rales, rhonchi or wheezes  CV: regular rate and rhythm, normal S1 S2, no S3 or S4, no murmur, click or rub, no peripheral edema  MS: no gross musculoskeletal defects noted, no edema  SKIN: 2 mm flesh-colored papule on left side of posterior neck          Signed Electronically by: Tammy Brumfield MD

## 2025-04-28 NOTE — TELEPHONE ENCOUNTER
SEE IN OFFICE OR VIDEO VISIT TODAY OR TOMORROW:  * You need to be examined or have a video telemedicine visit.  * PCP OFFICE VISIT: Let me give you an appointment.  * Appointment scheduled for today, 4/28/25  * TRIAGER OPTION - MAKE VIDEO VISIT APPOINTMENT: I am going to set up a video telemedicine visit for you.  * IF NO AVAILABLE OFFICE OR VIDEO APPOINTMENTS: You need to be seen in an Urgent Care Center. Go there today. A nearby Urgent Care Center is often a good source of care.    Patient's cough started on 4/21/25  Moderate - severe HA and took Excedrin  Patient's daughter diagnosed with Scarlet fever and strep throat  Coughing up green mucous  Sore throat  No SOB or wheezing  No fever    Amber Prasad RN  [facility]      Collette Ramirez Nurse [employer] - Primary [facility] (supporting Yesy Mays MD)     We just learned that our daughter has strep throat. I have had cold symptoms and a cough for the past week. I lost my voice for two days but no sore throat. Just want to see if I should do a throat culture to make sure I don t have it and get her sick again.     Very best,     Collette   Reason for Disposition   Patient wants to be seen    Additional Information   Negative: Bluish (or gray) lips or face   Negative: SEVERE difficulty breathing (e.g., struggling for each breath, speaks in single words)   Negative: Rapid onset of cough and has hives   Negative: Coughing started suddenly after medicine, an allergic food or bee sting   Negative: Difficulty breathing after exposure to flames, smoke, or fumes   Negative: Sounds like a life-threatening emergency to the triager   Negative: Previous asthma attacks and this feels like asthma attack   Negative: Dry cough (non-productive; no sputum or minimal clear sputum) and within 14 days of COVID-19 Exposure   Negative: MODERATE difficulty breathing (e.g., speaks in phrases, SOB even at rest, pulse 100-120) and still present when  "not coughing   Negative: Chest pain present when not coughing   Negative: Passed out (e.g., fainted, lost consciousness, blacked out and was not responding)   Negative: Patient sounds very sick or weak to the triager   Negative: MILD difficulty breathing (e.g., minimal/no SOB at rest, SOB with walking, pulse <100) and still present when not coughing   Negative: Coughed up > 1 tablespoon (15 ml) blood (Exception: Blood-tinged sputum.)   Negative: Fever > 103 F (39.4 C)   Negative: Fever > 101 F (38.3 C) and over 60 years of age   Negative: Fever > 100 F (37.8 C) and has diabetes mellitus or a weak immune system (e.g., HIV positive, cancer chemotherapy, organ transplant, splenectomy, chronic steroids)   Negative: Fever > 100 F (37.8 C) and bedridden (e.g., CVA, chronic illness, recovering from surgery)   Negative: Increasing ankle swelling   Negative: Wheezing is present   Negative: SEVERE coughing spells (e.g., whooping sound after coughing, vomiting after coughing)   Negative: Coughing up ina-colored (reddish-brown) or blood-tinged sputum   Negative: Fever present > 3 days (72 hours)   Negative: Fever returns after gone for over 24 hours and symptoms worse or not improved   Negative: Using nasal washes and pain medicine > 24 hours and sinus pain persists   Negative: Known COPD or other severe lung disease (i.e., bronchiectasis, cystic fibrosis, lung surgery) and symptoms getting worse (i.e., increased sputum purulence or amount, increased breathing difficulty)   Negative: Continuous (nonstop) coughing interferes with work or school and no improvement using cough treatment per Care Advice    Answer Assessment - Initial Assessment Questions  1. ONSET: \"When did the cough begin?\"   Patient's cough started on 4/21/25  Moderate - severe HA and took Excedrin  Patient's daughter diagnosed with Scarlet fever and strep throat  Coughing up green mucous  Sore throat  No SOB or wheezing  No fever    2. SEVERITY: \"How bad is " "the cough today?\"       Moderate cough    3. SPUTUM: \"Describe the color of your sputum\" (e.g., none, dry cough; clear, white, yellow, green)      Coughing up green mucous    4. HEMOPTYSIS: \"Are you coughing up any blood?\" If Yes, ask: \"How much?\" (e.g., flecks, streaks, tablespoons, etc.)      No blood    5. DIFFICULTY BREATHING: \"Are you having difficulty breathing?\" If Yes, ask: \"How bad is it?\" (e.g., mild, moderate, severe)       No SOB or wheezing    6. FEVER: \"Do you have a fever?\" If Yes, ask: \"What is your temperature, how was it measured, and when did it start?\"  No fever    7. CARDIAC HISTORY: \"Do you have any history of heart disease?\" (e.g., heart attack, congestive heart failure)       None    8. LUNG HISTORY: \"Do you have any history of lung disease?\"  (e.g., pulmonary embolus, asthma, emphysema)      None    9. PE RISK FACTORS: \"Do you have a history of blood clots?\" (or: recent major surgery, recent prolonged travel, bedridden)      None    10. OTHER SYMPTOMS: \"Do you have any other symptoms?\" (e.g., runny nose, wheezing, chest pain)        Moderate - severe HA and took Excedrin    11. PREGNANCY: \"Is there any chance you are pregnant?\" \"When was your last menstrual period?\"        On BC    12. TRAVEL: \"Have you traveled out of the country in the last month?\" (e.g., travel history, exposures)        None    Protocols used: Cough-A-OH    "

## 2025-05-08 ENCOUNTER — PATIENT OUTREACH (OUTPATIENT)
Dept: CARE COORDINATION | Facility: CLINIC | Age: 35
End: 2025-05-08
Payer: COMMERCIAL

## 2025-05-12 ASSESSMENT — ANXIETY QUESTIONNAIRES
7. FEELING AFRAID AS IF SOMETHING AWFUL MIGHT HAPPEN: NOT AT ALL
1. FEELING NERVOUS, ANXIOUS, OR ON EDGE: SEVERAL DAYS
4. TROUBLE RELAXING: SEVERAL DAYS
8. IF YOU CHECKED OFF ANY PROBLEMS, HOW DIFFICULT HAVE THESE MADE IT FOR YOU TO DO YOUR WORK, TAKE CARE OF THINGS AT HOME, OR GET ALONG WITH OTHER PEOPLE?: SOMEWHAT DIFFICULT
GAD7 TOTAL SCORE: 5
3. WORRYING TOO MUCH ABOUT DIFFERENT THINGS: SEVERAL DAYS
GAD7 TOTAL SCORE: 5
IF YOU CHECKED OFF ANY PROBLEMS ON THIS QUESTIONNAIRE, HOW DIFFICULT HAVE THESE PROBLEMS MADE IT FOR YOU TO DO YOUR WORK, TAKE CARE OF THINGS AT HOME, OR GET ALONG WITH OTHER PEOPLE: SOMEWHAT DIFFICULT
7. FEELING AFRAID AS IF SOMETHING AWFUL MIGHT HAPPEN: NOT AT ALL
2. NOT BEING ABLE TO STOP OR CONTROL WORRYING: SEVERAL DAYS
GAD7 TOTAL SCORE: 5
6. BECOMING EASILY ANNOYED OR IRRITABLE: SEVERAL DAYS
5. BEING SO RESTLESS THAT IT IS HARD TO SIT STILL: NOT AT ALL

## 2025-05-12 ASSESSMENT — PATIENT HEALTH QUESTIONNAIRE - PHQ9
SUM OF ALL RESPONSES TO PHQ QUESTIONS 1-9: 4
10. IF YOU CHECKED OFF ANY PROBLEMS, HOW DIFFICULT HAVE THESE PROBLEMS MADE IT FOR YOU TO DO YOUR WORK, TAKE CARE OF THINGS AT HOME, OR GET ALONG WITH OTHER PEOPLE: SOMEWHAT DIFFICULT
SUM OF ALL RESPONSES TO PHQ QUESTIONS 1-9: 4

## 2025-05-13 ENCOUNTER — VIRTUAL VISIT (OUTPATIENT)
Dept: PSYCHIATRY | Facility: CLINIC | Age: 35
End: 2025-05-13
Payer: COMMERCIAL

## 2025-05-13 DIAGNOSIS — F33.1 MODERATE EPISODE OF RECURRENT MAJOR DEPRESSIVE DISORDER (H): Primary | ICD-10-CM

## 2025-05-13 DIAGNOSIS — F41.1 GENERALIZED ANXIETY DISORDER: ICD-10-CM

## 2025-05-13 NOTE — NURSING NOTE
Is the patient currently in the state of [place]? YES    Visit mode:VIDEO    If the visit is dropped, the patient can be reconnected by: VIDEO VISIT: Text to cell phone: [phone]    Will anyone else be joining the visit? NO      How would you like to obtain your AVS? MyChart    Are changes needed to the allergy or medication list? NO    Reason for visit: Follow Up      Virtual Visit Details    Type of service:  Video Visit     Originating Location (pt. Location): Home    Distant Location (provider location):  Off-site  Platform used for Video Visit: Anuj

## 2025-05-14 ENCOUNTER — DOCUMENTATION ONLY (OUTPATIENT)
Dept: PSYCHIATRY | Facility: CLINIC | Age: 35
End: 2025-05-14

## 2025-05-14 ENCOUNTER — TELEPHONE (OUTPATIENT)
Dept: PSYCHIATRY | Facility: CLINIC | Age: 35
End: 2025-05-14
Payer: COMMERCIAL

## 2025-05-14 NOTE — TELEPHONE ENCOUNTER
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 PA: Insurance Change  Previously Tried and Failed:  Taking this dosage for several months.  Rationale:  Pt has been taking since September 2024 under previous insurance.     Insurance   Primary: [facility]   Insurance ID:  [id number]      Secondary (if applicable):N/A  Insurance ID:  N/A     Pharmacy Information (if different than what is on RX)  Name:    [id number] - [place], [place] - [address]   Phone:  [phone]  Fax:[phone]     Clinic Information  Preferred routing pool for dept communication: [facility] Psychiatry Nurse Pool

## 2025-05-14 NOTE — PROGRESS NOTES
[facility]  [address], [address]  [place] [address]        Medication History and Review            Collette is a 34 year old who prefers the name Collette and uses pronouns she, her, who will be seeing Dr. Rice on 05/20/2025         Partner/Support: -Conrado, (supportive)     Care Team  Therapist: Priscilla Romero (Private)  PCP: Yesy Mays  OB-GYN: N/A  Psychiatry: Odebunmi, Tolulope Oluwadamilola, MD Psychiatry [facility]          Current Medication:  Vilazodone 20 mg  Gabapentin 100 -300 mg hs prn  Bupropion 300 mg        Selective Serotonin Reuptake inhibitor:  Fluoxetine was tried max dose 80 mg Adequate trial  Paroxetine was tried which was helpful for some time   Escitalopram was tried dose unknown.- was helpful but got into a terrible work situation and started drinking heavily and was switch to a different medication  Sertraline was tried causes weight gain        Serotonin - Noradrenaline  reuptake inhibitor:    Duloxetine was prescribed but did not take          Serotonin Modulator:    Vilazodone was tried max dose 20 mg Adequate trial          Noradrenaline and Dopamine reuptake inhibitor:    Bupropion was tried max dose 300 mg Adequate trial         Tricyclic Antidepressants (TCA):    none            Tetracyclic Antidepressants:    none          Monoamine Oxidase Inhibitor (MAOI):   none          Antipsychotics:   Aripiprazole was tried max dose 5 mg Adequate trial        Stimulants:   none          Benzodiazepines:   none          Miscellaneous:   Gabapentin was tried 100 -300 mg          Ketamine Treatment:   Ketamine- none  TMS- none  ECT- none          Other Reported Treatment for Depression and Related Mood Disorder History:    DBT, Day Treatment, Eating Disorder Tx etc]- Individual therapy for alcohol absue           PROVIDER:     Jadon Smith APRN, CNP, DNP  [facility]  Interventional Psychiatry Program

## 2025-05-14 NOTE — PROGRESS NOTES
[facility]  [address], [address]  [place] [address]        Medication History and Review            Collette is a 34 year old who prefers the name Collette and uses pronouns she, her, who will be seeing Dr. Rice on 05/20/2025         Partner/Support: -Conrado, (supportive)     Care Team  Therapist: Priscilla Romero (Private)  PCP: Yesy Mays  OB-GYN: N/A  Psychiatry: Odebunmi, Tolulope Oluwadamilola, MD Psychiatry [facility]          Current Medication:  Vilazodone 20 mg  Gabapentin 100 -300 mg hs prn  Bupropion 300 mg        Selective Serotonin Reuptake inhibitor:  Fluoxetine was tried max dose 80 mg Adequate trial  Paroxetine was tried which was helpful for some time   Escitalopram was tried dose unknown.- was helpful but got into a terrible work situation and started drinking heavily and was switch to a different medication  Sertraline was tried causes weight gain        Serotonin - Noradrenaline  reuptake inhibitor:    Duloxetine was prescribed but did not take          Serotonin Modulator:    Vilazodone was tried max dose 20 mg Adequate trial          Noradrenaline and Dopamine reuptake inhibitor:    Bupropion was tried max dose 300 mg Adequate trial         Tricyclic Antidepressants (TCA):    none            Tetracyclic Antidepressants:    none          Monoamine Oxidase Inhibitor (MAOI):   none          Antipsychotics:   Aripiprazole was tried max dose 5 mg Adequate trial        Stimulants:   none          Benzodiazepines:   none          Miscellaneous:   Gabapentin was tried 100 -300 mg          Ketamine Treatment:   Ketamine- none  TMS- none  ECT- none          Other Reported Treatment for Depression and Related Mood Disorder History:    DBT, Day Treatment, Eating Disorder Tx etc]- Individual therapy for alcohol absue           PROVIDER:     Jadon Smith APRN, CNP, DNP  [facility]  Interventional Psychiatry  Program      Answers submitted by the patient for this visit:  Patient Health Questionnaire (Submitted on 5/12/2025)  If you checked off any problems, how difficult have these problems made it for you to do your work, take care of things at home, or get along with other people?: Somewhat difficult  PHQ9 TOTAL SCORE: 4  Patient Health Questionnaire (G7) (Submitted on 5/12/2025)  ALLI 7 TOTAL SCORE: 5

## 2025-05-17 NOTE — PROGRESS NOTES
Treatment Resistant Depression Program  Diagnostic Assessment  [facility]    Collette Boss MRN# [id number]   Age: 34 year old YOB: 1990     Date of Evaluation: 5/13/25  Start Time: 10:00; End Time: 11:19    Mode of communication: [employer] (HIPAA compliant, secure platform). Patient consented verbally to this mode of therapy today.  Reason for telehealth: COVID-19. This patient visit was converted to a telehealth visit to minimize exposure to COVID-19.    Originating Location (patient location): home, located in [place]  Distant Location (provider location): Home office, located in [place], [place], using appropriate privacy considerations and procedures         Care Team     PCP- Yesy Mays  Specialty Providers- none  Therapist- Nelly Jenkins PsyD, in private practice  Psychiatric Med Management Provider- Dr Woodard at [facility]  Other Mental Health Providers- none    Referred by: psychiatrist  Referred for evaluation of:  depression, anxiety         Contributors to the Assessment     Chart Reviewed.   Interview completed with Collette Boss.  Collateral information obtained? no           Chief Complaint     Depression, anxiety that have been treated without remission for the last 10 years        Psychiatric History and Present Illness      Collette Boss is a 34 year old  female who goes by Collette and uses she/her pronouns.    Collette reports one lifetime episode(s) of depression and has a history of no psychiatric hospitalization(s).     Collette's first episode of depression started in early adulthood, during college.   Collette reports that since then there has not been a period of at least two months with full resolution of symptoms.    Current psychosocial stressors include being a first time parent, working two jobs, anxiety      Collette is interested in TMS treatment.      Past diagnoses: MDD, ALLI    Past medication trials: multiple  "trials    Hospitalizations: No    Commitment: No, Current Sheikh order: No    ECT trials: No    TMS trials:  No      Ketamine:  No    Suicide attempts: No    Self-injurious behavior: No    Aggressive or violent behavior: No    Past Outpatient Programs & Services  Medication management, Outpatient individual psychotherapy, and support group    Psych critical item history multiple psychotropic trials    Other mental health concerns discussed: anxiety, trauma    Sleep study: No    ADHD testing: No    ASD assessment:  No    Current Outpatient Programs & Services  Medication management and Outpatient individual psychotherapy         Psychiatric Review of Systems (Completed M.I.N.I. Version 7.0.2)     A. DEPRESSION  Past 2 Weeks:  low mood nearly every day, anhedonia most of the time, difficulties with sleep, and difficulty concentrating, thinking or making decisions    Past Episode:  low mood nearly every day, anhedonia most of the time, appetite change (increase), difficulties with sleep, psychomotor changes (agitation), low energy, and worthlessness and/or guilt    B. SUICIDALITY:  Risk: Low  Current suicidal ideation: No, denies a plan, and denies intent.     -reports 0% in response to \"How likely are to you to try to kill yourself within the next 3 months on a scale from 0-100%?\"  -denies current SIB/Self Injurious Behavior and denies past SIB    -reports no lifetime suicide attempts.  Notable risk factors for self-harm, suicide include feels trapped and severe anxiety.  However, risk is mitigated by no h/o suicide attempt, no plan or intent, no h/o risky impulsive behavior, h/o seeking help when needed, future oriented, none to minimal alcohol use , commitment to family, stable housing, and good job situation.      C. RAYMOND/HYPOMANIA  Current Episode:  none    Past Episode:  none    D. PANIC:  none    E. AGORAPHOBIA:  none    F. SOCIAL ANXIETY:  none    G. OBSESSIVE-COMPULSIVE:  none    H. TRAUMA:  none    I. " ALCOHOL & J. NON-ALCOHOL:  See below    K. PSYCHOSIS:   none    L-M. EATING DISORDER: food restriction and binge eating    N. GENERALIZED ANXIETY:  excessive anxiety or worry about several routine things, most days, with difficulty controlling worry, feel restless, keyed up or on edge, muscle tension, easily tired, weak or exhausted, difficulty concentrating or mind goes blank, and difficulty sleeping    O. RULE OUT MEDICAL, ORGANIC OR DRUG CAUSES FOR ALL DISORDERS  During any current disorder or past mood episode, patient reports:  A. Substance use or withdrawal: No  B. Medical illness: No    P. ANTISOCIAL PERSONALITY:  none     Other Cluster B Traits Identified (not formally assessed):  none discussed    PHQ-9 Developed by Billy Leger,Linette Bush,Albert Muñoz and Colleagues,with an Educational Jerry From [employer]  1.  Little interest or pleasure in doing things: (Patient-Rptd) Several days  2.  Feeling down, depressed, or hopeless: (Patient-Rptd) Several days  3.  Trouble falling or staying asleep, or sleeping too much: (Patient-Rptd) Several days  4.  Feeling tired or having little energy: (Patient-Rptd) Not at all  5.  Poor appetite or overeating: (Patient-Rptd) Several days  6.  Feeling bad about yourself - or that you are a failure or have let yourself or your family down: (Patient-Rptd) Not at all  7.  Trouble concentrating on things, such as reading the newspaper or watching television: (Patient-Rptd) Not at all  8.  Moving or speaking so slowly that other people could have noticed. Or the opposite - being so fidgety or restless that you have been moving around a lot more than usual: (Patient-Rptd) Not at all  9.  Thoughts that you would be better off dead, or of hurting yourself in some way: (Patient-Rptd) Not at all  PHQ-9 Total Score: (Patient-Rptd) 4     SUBSTANCE USE HISTORY                                                                 CAGE-AID = 1    Have you felt you ought to  cut down on your drinking or drug use? yes  Have people annoyed you by criticizing your drinking or drug use? no  Have you felt bad or guilty about your drinking or drug use? no  Have you ever had a drink or used drugs first thing in the morning to steady your nerves or to get rid of a hangover? no      RECENT SUBSTANCE USE:     TOBACCO- none  CAFFEINE-  4-5 espressos/day  ALCOHOL- 4/week     CANNABIS- none            OTHER ILLICIT DRUGS- none    Past Use-   TOBACCO- none  CAFFEINE-  4-5 espressos/day  ALCOHOL- history over using alcohol to cope with depression and anxiety symptoms in her 20s. Had a period of not drinking and now she limits herself to 1-2 drinks at a time.  CANNABIS- none            OTHER ILLICIT DRUGS- none    CD Treatment history: No  Medical consequences due to use (eg HIV/Hepatitis)- No  Legal consequences due to use- No    SOCIAL and FAMILY HISTORY                                                             Collette Boss is a 34 year old   female with a psychiatric history of anxiety and depression who presents for [facility] Treatment Resistant Depression program evaluation. Collette was referred by her psychiatrisst.     Living situation: Collette lives with her  and isabelager in a Private Residence.   Kids? Yes - two year old daughter  Pets? Yes - dog and a cat  Guns, weapons, or other means to harm oneself in the home? No     Education: Collette s highest level of education is graduate school    Occupation: Collette is currently working two part time jobs, one remote and on in person.    Finances: Collette is financial supported by Employment    Relationships (kid/grandkids, spouse/partner, friends, support people): Specific Relationships & Quality of Relationship: Collette feels supported by her , family, and friends.     Spiritual considerations: No    Legal History: No    Trauma/Abuse History: No Collette denies symptoms in criteria A and B on the MINI. Following trauma-informed best  practice, additional details were not requested or discussed.     History: No    Family Mental Health History: Mother - depression, paternal aunt - diagnosed with schizophrenia as an older adult but patient is unsure that was an accurate diagnosis     Strengths & Coping Strategies:  Pleasant and easy to engage, seeking additional options for treatment , actively engaged in mental health care, good insight, strong family relationships, strong friendships, employed, and basic needs are met    PAST PSYCH MED TRIALS      Will be reviewed during MTM.    MEDICAL / SURGICAL HISTORY                                   Patient Active Problem List   Diagnosis    History of gestational diabetes mellitus (GDM)    History of pre-eclampsia    Diminished ovarian reserve    Family history of malignant neoplasm of breast    Gastroesophageal reflux disease, unspecified whether esophagitis present    Major depressive disorder, recurrent episode, moderate (H)    Class 2 severe obesity due to excess calories with serious comorbidity in adult (H)    Mixed hyperlipidemia       Past Surgical History:   Procedure Laterality Date     SECTION      OTHER SURGICAL HISTORY      Uterine polyp removal, hysteroscopy        History of seizures: no   History of head trauma/loss of consciousness: no     ALLERGY                                Patient has no known allergies.    MEDICATIONS                               Current Outpatient Medications   Medication Sig Dispense Refill    APRI 0.15-30 MG-MCG tablet Take 1 tablet by mouth daily at 2 pm. Skip week of placebo pills. 84 tablet 4    buPROPion (WELLBUTRIN XL) 300 MG 24 hr tablet Take 1 tablet (300 mg) by mouth every morning. 90 tablet 0    calcium carbonate (TUMS) 500 MG chewable tablet Take 1 chew tab by mouth daily as needed for heartburn      famotidine (PEPCID) 20 MG tablet Take 1 tablet (20 mg) by mouth 2 times daily as needed (heartburn) 60 tablet 1    gabapentin  (NEURONTIN) 100 MG capsule Take 1-3 capsules (100-300 mg) by mouth nightly as needed for other (sleep). 90 capsule 1    triamcinolone (KENALOG) 0.1 % external cream Apply topically daily as needed for irritation      vilazodone (VIIBRYD) 20 MG TABS tablet Take 1 tablet (20 mg) by mouth daily. 90 tablet 0       VITALS                                                                                                                            3, 3   There were no vitals taken for this visit.     MENTAL STATUS EXAM                                                                                    9, 14 cog gs     Alertness: alert  and oriented  Appearance: well groomed  Behavior/Demeanor: cooperative, pleasant, and calm, with good  eye contact   Speech: normal  Language: intact  Psychomotor: normal or unremarkable  Mood: anxious  Affect: full range; was congruent to mood; was congruent to content  Thought Process/Associations: unremarkable  Thought Content:  Reports none;  Denies suicidal and violent ideation  Perception:  Reports: none;  Denies: auditory hallucinations and visual hallucinations  Insight: good  Judgment: good  Cognition: does appear grossly intact; formal cognitive testing was not done    PSYCHIATRIC DIAGNOSES                                                                                               Major depressive disorder  Generalized anxiety disorder     ASSESSMENT                                                                                                          m2, h3     Please note, writer did not receive all pertinent medical records as of the time of this assessment. Patient did not sign RICARDO's for additional records.     Today, Collette presents as a Adequate historian with Good insight. Collette s past and present depressive symptoms seem consistent with a diagnosis of major depressive disorder. Depressive symptoms seem to contribute to impaired functioning in the areas of family / partner  "relationships , occupational performance, and emotional wellbeing . Precipitating factors may include family history. Perpetuating factors may consist of multiple medication trials, severe anxiety.     The M.I.N.I. scores positively for a diagnosis/diagnoses of ALLI.   Substance use does not seem to be a current problem.     Collette reports she has been addressing anxiety and depression with therapy and medication for the last 10 years and has not had a period of remission. She reports that before that she had \"big problems with anxiety\" and notes that anxiety had been \"fuel to be type A\" for many years. She first tried paxil when she was living in [place]  and it was effective for the first four months she was taking it. Unfortunately, access to care was inconsistent and she stopped taking it. She reports that when she was able to get paxil again, it was not as effective as the first time she took it.  Since returning to the [place] she has tried other medications and combinations of medications with some success, but not remission. She reports the current medications she takes are helpful, but she still \"has some really hard days\" and \"it can still be a real slog to get through [my[ day.\"     Collette reports her current symptoms as low motivation and activation, sadness, low energy, and struggling to be present/available for her daughter.  She reports \"the first year was horrible\" after he daughter was born. She and her  had five rounds of IVF before their daughter was born.     Chart review includes additional diagnosis/es of none  Patient reports current diagnosis/es of depression and anxiety  and agrees with this assessment.  Patient reports past diagnosis/es of  none     Further diagnostic information is not needed to clarify additional diagnosis .     Basic needs (food, housing, transportation, safety, income stability): met    Today, based on all available evidence, patient does not appear to be at imminent risk " for self-harm; therefore, does not meet criteria for a 72-hr hold/involuntary hospitalization.     Additional steps to minimize risk discussed, include: people to reach out to, providers to reach out to, crisis numbers and texts, and EmPATH.  24/7 crisis resources were provided verbally.     PLAN                                                                                                                        m2, h3   Patient will meet with TRD program Psychiatrist to complete the comprehensive assessment. Informed Collette that if appropriate for Interventional Psychiatry treatments, care will be provided with goal of stabilization with subsequent transfer back to the community (i.e. PCP or previous psychiatrist).    Medication review:  a TRD nurse will contact you to review your medication history before your TRD psychiatry appointment     Therapy:  Yes - continue with current individual therapy and grief group    Crisis Resources provided:  24/7 crisis resources including but not limited to the following:   - National Suicide Prevention Hotline: 1-037-760-TALK (4410)   - Crisis Text Line: Text START to 289-473   - Mental Health Emergency Number: 988   - EmPATH at [facility]    Other Referrals:  No    Discussed: consultation model of Treatment Resistant Depression (TRD) Program, limits of consultation model, requirements of the program    RTC: For psychiatry visit    HOWARD John         Answers submitted by the patient for this visit:  Patient Health Questionnaire (Submitted on 5/12/2025)  If you checked off any problems, how difficult have these problems made it for you to do your work, take care of things at home, or get along with other people?: Somewhat difficult  PHQ9 TOTAL SCORE: 4  Patient Health Questionnaire (G7) (Submitted on 5/12/2025)  ALLI 7 TOTAL SCORE: 5

## 2025-05-18 NOTE — TELEPHONE ENCOUNTER
PA Initiation    Medication: VILAZODONE HCL 20 MG PO TABS  Insurance Company: [employer] Non-Specialty PA's - Phone [phone] Fax [phone]  Pharmacy Filling the Rx: [id number] [place], [place] - [address]  Filling Pharmacy Phone: [phone]  Filling Pharmacy Fax: [phone]  Start Date: 5/17/2025

## 2025-05-18 NOTE — TELEPHONE ENCOUNTER
Per pharmacy processing info is     BIN [id number]  [facility] A4  GROUP [id number]  ID [id number]

## 2025-05-19 NOTE — TELEPHONE ENCOUNTER
Prior Authorization Approval    Medication: VILAZODONE HCL 20 MG PO TABS  Authorization Effective Date: 4/18/2025  Authorization Expiration Date: 5/18/2026  Approved Dose/Quantity:   Reference #:     Insurance Company: [employer] Non-Specialty PA's - Phone [phone] Fax [phone]  Expected CoPay: $    CoPay Card Available:      Financial Assistance Needed:   Which Pharmacy is filling the prescription: [id number] - [place], [place] - [address]  Pharmacy Notified: YES  Patient Notified: **Instructed pharmacy to notify patient when script is ready to /ship.**

## 2025-05-19 NOTE — PROGRESS NOTES
" [facility]  [address], [address]  [place] [address]  New Patient Evaluation       Collette Boss is a 34 year old female who prefers the name Collette and pronoun she, her, hers.  Therapist: Priscilla Romero (Private)   Psychiatric Medication: Psychiatrist: Odebunmi, Tolulope Oluwadamilola, MD Psychiatry [facility]   PCP: Ysey Mays  Other Providers: None elicited today  Referred by Dr Woodard for evaluation of depression and anxiety without remission over the past 10 years..       History was provided by patient who was a good historian.      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      Collette first experienced mental health symptoms of anxiety as an undergraduate student. Had a lot of test anxiety with IBS. Started to experience depression 10 years ago (2014). In that period, she lived [place] and drank heavily. She used Paxil which was helpful for some time. When [place] the [place], was placed on Lexapro which was helpful but got into a terrible work situation and started drinking heavily again. Collette reports that since then there has not been a period of at least two months with full resolution of symptoms. Collette reports she has been addressing anxiety and depression with therapy and medication for the last 10 " "years and has not had a period of remission.     She reports that before that she had \"big problems with anxiety\" and notes that anxiety had been \"fuel to be type A\" for many years. She first tried paxil when she was living in [place]  and it was effective for the first four months she was taking it. Unfortunately, access to care was inconsistent and she stopped taking it. She reports that when she was able to get paxil again, it was not as effective as the first time she took it.  [place] she has tried other medications and combinations of medications with some success, but not remission. She reports the current medications she takes are helpful, but she still \"has some really hard days\" and \"it can still be a real slog to get through my day.\"    Collette reports her current symptoms as low motivation and activation, sadness, low energy, and struggling to be present/available for her daughter.  She reports \"the first year was horrible\" after he daughter was born. She and her  had five rounds of IVF before their daughter was born.       Recently:  Collette continues to report stable mood with her current medication regimen and starting a grief group. Feeling less isolated with her grief group and the community she's built through her part-time job. She feels happy and proud at the progress she has made in her mental health and parenting. She enjoys her daughter more and feels like she's at a place she hoped she would be as a mother.  No SI, HI, or acute mental safety concerns.       Today:  She feels symptoms are better in terms of resolution of her postpartum component, but she also notes that there isn't resolution, she still notices residual sx that were present prepartum also.  Current symptoms are \"low energy, just feeling down\", a sense of residual sadness or blunting of domonique, an \"oppression\".    She inquired if she is gearing up for a lifetime of medication. We clarified that at least one " "medication is very likely, potentially more than one medications, given her history of depression and current stressors of life and parenting.    She does note that her first medication (paroxetine) was a major improvement, but nothing since then has done as well. \"That was a happy medicine, I just felt actively happy.\" Bupropion has raised her energy, it has left the \"undercurrent\" of subjective sadness. More weight gain than weight loss, which she thinks is due to using food as a means of coping. Has trouble with middle insomnia to terminal insomnia; gabapentin helps this.        Psychiatric Review of Symptoms:   Additionally, the patient notes the following relevant symptoms:    Anxiety (Per MINI):  ALLI: ENDORSES excessive anxiety (about several routine things), restlessness, muscle tension, fatigue due to anxiety, difficulty sleeping due to anxiety, and difficulty concentrating due to anxiety  Tends to make it \"hard to move forward int he day\", experienced as a subjective source of overwhelm.    Panic: denies panic attacks  Social phobia: denies   Agoraphobia: denies   Obsessions: denies  Compulsions: denies  Hoarding: denies     Trauma-Related (per MINI):  Trauma: denies     Flavia: No history per charts and recent assessment. Does not recall if she had an increased activation on paroxetine, but no evidence of impulsivity at that time.     Psychosis: No history per charts and recent assessment.     Eating Disorder:  No history per charts and recent assessment.     Homicidal Ideation: denies     Current Substance Use: denies    Personality Disorder:  Efforts to avoid abandonment: None noted on chart review  Unstable relationships, splitting: None noted on chart review  Identity disturbance: None noted on chart review  Impulsivity: No evidence in chart; denies on interview  Suicidal/Self-injurious behavior:  No history per charts and recent assessment.  High mood reactivity: None noted on chart review  Emptiness: " "Heaviness, but not subjective emptinessNone noted on chart review  Intense anger: Not lifelong; reports that she has had higher irritability past year or two.  Dissociation: None noted on chart review         Recent Substance Use:  Endorses 1 glass/drink alcohol per day, no tobacco, no cannabis.  Views herself as having addiction vulnerability.     Current Medication:  Vilazodone 20 mg  Bupropion 300 mg   Gabapentin 100 -300 mg hs PRN      Substance Use History     Alcohol- yes   History over using alcohol to cope with depression and anxiety symptoms in her 20s. Currently follows periods of not drinking (\"because I am a mom\") and she limits herself to 1-2 drinks at a time.    Treatment [#, most recent]- None    Medical Consequences [withdrawal, sz etc]- None   HIV/Hepatitis- SIB- None    Legal Consequences- None      Caffeine use - yes    No other substance use     Psychiatric History   Past diagnoses:   - MDD  - ALLI  Suicidal ideation- None   Suicide Attempt:   No history  per charts and recent assessment.  SIB- None per charts and recent assessment.  Flavia- None per charts and recent assessment.  Psychosis- None per charts and recent assessment.  Eating Disorder- None   Violence/Aggression- None   Psych Hosp- None   ECT- None   TMS - None   Ketamine - None   Outpatient Programs [ DBT, Day Treatment, Eating Disorder Tx etc]- Individual therapy for alcohol use.         Social/ Family History               [per patient report]                                                   FINANCIAL SUPPORT- working. Collette is currently working two part time jobs, one remote and on in person.        RELATIONSHIPS/CHILDREN-  for >11 years, has a two year old daughter       LIVING SITUATION-Recently moved from [place] . Moved because of 's job.  In-laws that live here. She finds them supportive. Most of her friends are still in [place]. Lives with family ( and daughter), dog and cat.      LEGAL- None per " charts  EARLY HISTORY/ EDUCATION-  Born and raised in [place]. An only child. Struggled to have friends because of her upbringing. Did well in school and highest education level is a masters.  SOCIAL/ SPIRITUAL SUPPORT-        CULTURAL INFLUENCES/ IMPACT- None reported       TRAUMA HISTORY (self-report)- None per charts / MINI.  FAMILY HISTORY-  Mother - depression and AUD, paternal aunt - diagnosed with schizophrenia as an older adult but patient is unsure that was an accurate diagnosis        Current Medications     Current Outpatient Medications   Medication Sig Dispense Refill    APRI 0.15-30 MG-MCG tablet Take 1 tablet by mouth daily at 2 pm. Skip week of placebo pills. 84 tablet 4    buPROPion (WELLBUTRIN XL) 300 MG 24 hr tablet Take 1 tablet (300 mg) by mouth every morning. 90 tablet 0    calcium carbonate (TUMS) 500 MG chewable tablet Take 1 chew tab by mouth daily as needed for heartburn      famotidine (PEPCID) 20 MG tablet Take 1 tablet (20 mg) by mouth 2 times daily as needed (heartburn) 60 tablet 1    gabapentin (NEURONTIN) 100 MG capsule Take 1-3 capsules (100-300 mg) by mouth nightly as needed for other (sleep). 90 capsule 1    triamcinolone (KENALOG) 0.1 % external cream Apply topically daily as needed for irritation      vilazodone (VIIBRYD) 20 MG TABS tablet Take 1 tablet (20 mg) by mouth daily. 90 tablet 0        Psychiatric Medication Trials       Adequate dose and duration  Bupropion was tried max dose 300 mg Adequate trial  Fluoxetine was tried max dose 80 mg (2022-23) Adequate trial, marginally (anxety doses are more like 100-200mg)  Vilazodone was tried max dose 20 mg Adequate trial (marginal)  Paroxetine was tried which was helpful for some time  (said this was one of the best she has had)  Escitalopram was tried dose unknown (30-40 mg?) 2015 -2020.- was helpful but got into a terrible work situation and started drinking heavily and was switched to a different medication, seems to  have lost efficacy      Limited by side effects  Sertraline was tried 50 mg (?) [id number]- caused weight gain     Inadequate dose/duration  None noted     Information uncertain  None noted     Augmentation  Aripiprazole was prescribed max dose 5 mg but not taken ; efectively never has had augmentation     Other  Duloxetine was prescribed but did not take    Ketamine- none  TMS- none  ECT- none             Medical / Surgical History     Patient Active Problem List   Diagnosis    History of gestational diabetes mellitus (GDM)    History of pre-eclampsia    Diminished ovarian reserve    Family history of malignant neoplasm of breast    Gastroesophageal reflux disease, unspecified whether esophagitis present    Major depressive disorder, recurrent episode, moderate (H)    Class 2 severe obesity due to excess calories with serious comorbidity in adult (H)    Mixed hyperlipidemia       Past Surgical History:   Procedure Laterality Date     SECTION      OTHER SURGICAL HISTORY      Uterine polyp removal, hysteroscopy         Medical Review of Systems                                                                                                      Not performed today      Metals Screen   Yes No Item    X Implanted or lodged metals in body    X Implanted surgical devices    X Metal containing facial or scalp tattoos    X Non removable piercings   Seizure Screen  Yes No Item    X Current Seizure Disorder?    X History of Seizure?     Does patient have a cochlear implant? ____N______  Does patient have any shunts?____N_______  Does patient have a pacemaker?____N______  Does patient have a vagus nerve stimulator?___N_______  Does patient have a deep brain stimulator?____N______  Any other implanted device?________N________       Allergy   Patient has no known allergies.     Vitals                                                                                                                             /81   " Pulse 80   Temp 97.1  F (36.2  C) (Temporal)   Wt 104.4 kg (230 lb 3.2 oz)   SpO2 100%   BMI 36.13 kg/m        Mental Status Exam                                                                                        Alertness: alert  and oriented  Appearance: well groomed and casually dressed, appropriate for season  Behavior/Demeanor: cooperative, pleasant, and calm, with good  eye contact   Speech: normal and regular rate and rhythm  Language: intact and no problems  Psychomotor: normal or unremarkable  Mood: \"just tired\"  Affect: restricted; was congruent to mood; was congruent to content  Thought Process/Associations: unremarkable  Thought Content:  Reports none;  Denies suicidal ideation and violent ideation  Perception:  Reports none;  Denies auditory hallucinations and visual hallucinations  Insight: good  Judgment: good  Cognition: (6) does  appear grossly intact; formal cognitive testing was not done  Gait and Station: unremarkable     Labs and Data     Rating Scales:        PHQ9 Today:  not collected      2/5/2025     3:18 PM 4/9/2025     2:34 PM 5/12/2025     2:13 PM   PHQ   PHQ-9 Total Score 3  2  4    Q9: Thoughts of better off dead/self-harm past 2 weeks Not at all Not at all Not at all       Patient-reported         No lab results found.  No lab results found.    Diagnosis and Assessment                                                                                  Collette Boss is a 34 year old female with previous psychiatric history of MDD, recurrent, severe who presents for evaluation of depression and discussion of advanced therapeutic options. Diagnostically, Collette leon past and present depressive symptoms seem consistent with a diagnosis of major depressive disorder and Generalized Anxiety Disorder. Depressive symptoms seem to contribute to impaired functioning in the areas of family / partner relationships , occupational performance, and emotional wellbeing . Precipitating factors may " include family history. Perpetuating factors may consist of multiple medication trials, severe anxiety.   I do not see evidence of a comorbid personality disorder, trauma, or other factor that would suggest MDD is a secondary diagnosis/syndrome.    She has a well documented failure of adequate trials of >= 4 antidepressants . The patient has completed an adequate dose of individual psychotherapy. I do think there is augmentation as a possibility, see below.    Patient is burdened by her chronic symptoms of depression and her current episode has lasted over 100 months causing significant psychosocial dysfunction despite multiple trials of psychotropic medications and individual therapy. Due to remaining profound depression and numerous failed previous treatment modalities, the patient is a candidate for rTMS treament. I do not think ketamine or ECT makes sense given the lack of specific suicidal depression.     The risks, benefits, alternatives and potential adverse effects of the above have been explained and are understood by the patient. Collette Boss agrees to the treatment plan with the ability to do so. The pt knows to call the clinic for any problems or access emergency care if needed.   Medical considerations relevant to treatment are: Multiple diagnoses suggesting endocrine abnormalities  Substance concerns relevant to treatment are: None, but some addictive vulnerability    During the course of these treatments, the patient will be asked not to make any medication changes.   While waiting to initiate these treatments, it is absolutely reasonable to make medication changes, and suggestions (if any) are below.  After treatment is complete, the patient will transfer back to the referring provider. We have discussed that I am not able to offer long-term medication management.    Suicide Risk Assessment:  Today Collette Boss reports no meaningful suicidal ideation. No further action is indicated.       Today the  following issues were addressed:    1) Depression    [place] Prescription Monitoring Program [] review was not needed today.    PSYCHOTROPIC DRUG INTERACTIONS: none clinically relevant        Plan                                                                                                                          1) Major depressive disorder with anxious features  -- Medications:   She is not enthusiasitc about medication changes, but if TMS does not work, my suggested algorithm is:  - Raise the bupropion; 600mg is reasonable and it has DA activity that may address the mild anhedonia.  - Add serotonin. It is there via vilazodone, but consider a return to paroxetine (worked well before) or adding dextromethorphan (mimics Auvelity).  - Augmentation; of the classic options, I would suggest T3 but this is gut feeling, lithium would also work, less inclined to anti-D2 given the weight gain concerns.  - Zuranolone; it is more effective for anxious depression (KAZ activity) and she does have those endocrine features.  - Consider a tricyclic, although this is lower priority than augmentation.  - Considering direct dopaminergic augmentation, e.g. pramipexole.          -- Psychotherapy:   Continue regular individual psychotherapy       -- Procedures:   Residual heaviness symptom can respond well to TMS. As her PHQ-9 scores are already low (below 5), we may not be able to capture her symptoms changes well and may depend on her subjective evaluations. She was worried about getting worse before getting better, which she cannot afford at this time due to her parenting demands; however, she expressed that she is willing to give it a try. We are starting the prior authorization process.    -- Referrals: None        CRISIS NUMBERS:   Provided routinely in AVS.    Treatment Risk Statement:  The patient understands the risks, benefits, adverse effects and alternatives. Agrees to treatment with the capacity to do so. No medical  contraindications to treatment. Agrees to call clinic for any problems. The patient understands to call 911 or go to the nearest ED if life threatening or urgent symptoms occur.            PROVIDER:  Ramez Dagher, MD    Time based billing.  Time on day of service includes:  60 min spent face to face with the patient   10 minutes pre-reviewing records  30 minutes preparing consultation note and follow up messages/documentation    Physician Attestation   I, Nolberto Rice MD, saw this patient and agree with the findings and plan of care as documented in the note.      Items personally reviewed/procedural attestation: I was present for and supervised the entire procedure.    Nolberto Rice MD    Physician Attestation   I, Nolberto Rice MD, was present with the medical/[employer] student who participated in the service and in the documentation of the note.  I have verified the history and personally performed the physical exam and medical decision making.  I agree with the assessment and plan of care as documented in the note.      Items personally reviewed: all, directly edited the note.    Nolberto Rice MD

## 2025-05-19 NOTE — TELEPHONE ENCOUNTER
Writer reviewed Cover My Meds. PA has been approved. Will f/up with pharmacy once open to confirm the Rx can now be processed.

## 2025-05-19 NOTE — TELEPHONE ENCOUNTER
Called the pharmacy and confirmed they are now able to process the Rx. Called the patient and notified her of the approval. Informed her the pharmacy will be filling the medication today.    Marielle Clements RN on 5/19/2025 at 9:27 AM

## 2025-05-19 NOTE — TELEPHONE ENCOUNTER
Patient calling to check on status of PA. Reports she is on her last pill of rx today    Pharmacy is [facility] [id number] [place] [facility] - [place], [place] - [address]

## 2025-05-20 ENCOUNTER — OFFICE VISIT (OUTPATIENT)
Dept: PSYCHIATRY | Facility: CLINIC | Age: 35
End: 2025-05-20
Payer: COMMERCIAL

## 2025-05-20 VITALS
BODY MASS INDEX: 36.13 KG/M2 | WEIGHT: 230.2 LBS | DIASTOLIC BLOOD PRESSURE: 81 MMHG | HEART RATE: 80 BPM | TEMPERATURE: 97.1 F | OXYGEN SATURATION: 100 % | SYSTOLIC BLOOD PRESSURE: 123 MMHG

## 2025-05-20 DIAGNOSIS — F33.1 MAJOR DEPRESSIVE DISORDER, RECURRENT EPISODE, MODERATE (H): ICD-10-CM

## 2025-05-20 ASSESSMENT — PAIN SCALES - GENERAL: PAINLEVEL_OUTOF10: NO PAIN (0)

## 2025-05-20 NOTE — Clinical Note
I saw Collette LOWE Clay, MRN# [id number], for a psychiatry intake appointment today, 5/20/25. This is the starting treatment plan. My full note will follow.  Thank you for your help.     TMS with favoring H1 but I won't mind F8

## 2025-05-20 NOTE — LETTER
"5/20/2025      RE: Collette Boss  [address]  [place] [address]        [facility]  [address], [address]  [place] [address]  New Patient Evaluation       Collette Boss is a 34 year old female who prefers the name Collette and pronoun she, her, hers.  Therapist: Priscilla Romero (Private)   Psychiatric Medication: Psychiatrist: Odebunmi, Tolulope Oluwadamilola, MD Psychiatry [facility]   PCP: Yesy Mays  Other Providers: None elicited today  Referred by Dr Woodard for evaluation of depression and anxiety without remission over the past 10 years..       History was provided by patient who was a good historian.      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      Collette first experienced mental health symptoms of anxiety as an undergraduate student. Had a lot of test anxiety with IBS. Started to experience depression 10 years ago (2014). In that period, she lived [place] and drank heavily. She used Paxil which was helpful for some time. [place], was placed on Lexapro which was helpful but got into a terrible work situation and started drinking heavily again. Collette reports that since then there has not been a period of at least two months with full resolution of symptoms. Collette reports she has been " "addressing anxiety and depression with therapy and medication for the last 10 years and has not had a period of remission.     She reports that before that she had \"big problems with anxiety\" and notes that anxiety had been \"fuel to be type A\" for many years. She first tried paxil when she was living in [place]  and it was effective for the first four months she was taking it. Unfortunately, access to care was inconsistent and she stopped taking it. She reports that when she was able to get paxil again, it was not as effective as the first time she took it.  [place] she has tried other medications and combinations of medications with some success, but not remission. She reports the current medications she takes are helpful, but she still \"has some really hard days\" and \"it can still be a real slog to get through my day.\"    Collette reports her current symptoms as low motivation and activation, sadness, low energy, and struggling to be present/available for her daughter.  She reports \"the first year was horrible\" after he daughter was born. She and her  had five rounds of IVF before their daughter was born.       Recently:  Collette continues to report stable mood with her current medication regimen and starting a grief group. Feeling less isolated with her grief group and the community she's built through her part-time job. She feels happy and proud at the progress she has made in her mental health and parenting. She enjoys her daughter more and feels like she's at a place she hoped she would be as a mother.  No SI, HI, or acute mental safety concerns.       Today:  She feels symptoms are better in terms of resolution of her postpartum component, but she also notes that there isn't resolution, she still notices residual sx that were present prepartum also.  Current symptoms are \"low energy, just feeling down\", a sense of residual sadness or blunting of domonique, an \"oppression\".    She inquired if she " "is gearing up for a lifetime of medication. We clarified that at least one medication is very likely, potentially more than one medications, given her history of depression and current stressors of life and parenting.    She does note that her first medication (paroxetine) was a major improvement, but nothing since then has done as well. \"That was a happy medicine, I just felt actively happy.\" Bupropion has raised her energy, it has left the \"undercurrent\" of subjective sadness. More weight gain than weight loss, which she thinks is due to using food as a means of coping. Has trouble with middle insomnia to terminal insomnia; gabapentin helps this.        Psychiatric Review of Symptoms:   Additionally, the patient notes the following relevant symptoms:    Anxiety (Per MINI):  ALLI: ENDORSES excessive anxiety (about several routine things), restlessness, muscle tension, fatigue due to anxiety, difficulty sleeping due to anxiety, and difficulty concentrating due to anxiety  Tends to make it \"hard to move forward int he day\", experienced as a subjective source of overwhelm.    Panic: denies panic attacks  Social phobia: denies   Agoraphobia: denies   Obsessions: denies  Compulsions: denies  Hoarding: denies     Trauma-Related (per MINI):  Trauma: denies     Flavia: No history per charts and recent assessment. Does not recall if she had an increased activation on paroxetine, but no evidence of impulsivity at that time.     Psychosis: No history per charts and recent assessment.     Eating Disorder:  No history per charts and recent assessment.     Homicidal Ideation: denies     Current Substance Use: denies    Personality Disorder:  Efforts to avoid abandonment: None noted on chart review  Unstable relationships, splitting: None noted on chart review  Identity disturbance: None noted on chart review  Impulsivity: No evidence in chart; denies on interview  Suicidal/Self-injurious behavior:  No history per charts and recent " "assessment.  High mood reactivity: None noted on chart review  Emptiness: Heaviness, but not subjective emptinessNone noted on chart review  Intense anger: Not lifelong; reports that she has had higher irritability past year or two.  Dissociation: None noted on chart review         Recent Substance Use:  Endorses 1 glass/drink alcohol per day, no tobacco, no cannabis.  Views herself as having addiction vulnerability.     Current Medication:  Vilazodone 20 mg  Bupropion 300 mg   Gabapentin 100 -300 mg hs PRN      Substance Use History     Alcohol- yes   History over using alcohol to cope with depression and anxiety symptoms in her 20s. Currently follows periods of not drinking (\"because I am a mom\") and she limits herself to 1-2 drinks at a time.    Treatment [#, most recent]- None    Medical Consequences [withdrawal, sz etc]- None   HIV/Hepatitis- SIB- None    Legal Consequences- None      Caffeine use - yes    No other substance use     Psychiatric History   Past diagnoses:   - MDD  - ALLI  Suicidal ideation- None   Suicide Attempt:   No history  per charts and recent assessment.  SIB- None per charts and recent assessment.  Flavia- None per charts and recent assessment.  Psychosis- None per charts and recent assessment.  Eating Disorder- None   Violence/Aggression- None   Psych Hosp- None   ECT- None   TMS - None   Ketamine - None   Outpatient Programs [ DBT, Day Treatment, Eating Disorder Tx etc]- Individual therapy for alcohol use.         Social/ Family History               [per patient report]                                                   FINANCIAL SUPPORT- working. Collette is currently working two part time jobs, one remote and on in person.        RELATIONSHIPS/CHILDREN-  for >11 years, has a two year old daughter       LIVING SITUATION-Recently moved from [place] . Moved because of 's job.  In-laws that live here. She finds them supportive. Most of her friends are still in [place]. Lives " with family ( and daughter), dog and cat.      LEGAL- None per charts  EARLY HISTORY/ EDUCATION-  Born and raised in [place]. An only child. Struggled to have friends because of her upbringing. Did well in school and highest education level is a masters.  SOCIAL/ SPIRITUAL SUPPORT-        CULTURAL INFLUENCES/ IMPACT- None reported       TRAUMA HISTORY (self-report)- None per charts / MINI.  FAMILY HISTORY-  Mother - depression and AUD, paternal aunt - diagnosed with schizophrenia as an older adult but patient is unsure that was an accurate diagnosis        Current Medications     Current Outpatient Medications   Medication Sig Dispense Refill     APRI 0.15-30 MG-MCG tablet Take 1 tablet by mouth daily at 2 pm. Skip week of placebo pills. 84 tablet 4     buPROPion (WELLBUTRIN XL) 300 MG 24 hr tablet Take 1 tablet (300 mg) by mouth every morning. 90 tablet 0     calcium carbonate (TUMS) 500 MG chewable tablet Take 1 chew tab by mouth daily as needed for heartburn       famotidine (PEPCID) 20 MG tablet Take 1 tablet (20 mg) by mouth 2 times daily as needed (heartburn) 60 tablet 1     gabapentin (NEURONTIN) 100 MG capsule Take 1-3 capsules (100-300 mg) by mouth nightly as needed for other (sleep). 90 capsule 1     triamcinolone (KENALOG) 0.1 % external cream Apply topically daily as needed for irritation       vilazodone (VIIBRYD) 20 MG TABS tablet Take 1 tablet (20 mg) by mouth daily. 90 tablet 0        Psychiatric Medication Trials       Adequate dose and duration  Bupropion was tried max dose 300 mg Adequate trial  Fluoxetine was tried max dose 80 mg (2022-23) Adequate trial, marginally (anxety doses are more like 100-200mg)  Vilazodone was tried max dose 20 mg Adequate trial (marginal)  Paroxetine was tried which was helpful for some time  (said this was one of the best she has had)  Escitalopram was tried dose unknown (30-40 mg?) 2015 -2020.- was helpful but got into a terrible work situation and  started drinking heavily and was switched to a different medication, seems to have lost efficacy      Limited by side effects  Sertraline was tried 50 mg (?) 2019-1259- caused weight gain     Inadequate dose/duration  None noted     Information uncertain  None noted     Augmentation  Aripiprazole was prescribed max dose 5 mg but not taken ; efectively never has had augmentation     Other  Duloxetine was prescribed but did not take    Ketamine- none  TMS- none  ECT- none             Medical / Surgical History     Patient Active Problem List   Diagnosis     History of gestational diabetes mellitus (GDM)     History of pre-eclampsia     Diminished ovarian reserve     Family history of malignant neoplasm of breast     Gastroesophageal reflux disease, unspecified whether esophagitis present     Major depressive disorder, recurrent episode, moderate (H)     Class 2 severe obesity due to excess calories with serious comorbidity in adult (H)     Mixed hyperlipidemia       Past Surgical History:   Procedure Laterality Date      SECTION       OTHER SURGICAL HISTORY      Uterine polyp removal, hysteroscopy         Medical Review of Systems                                                                                                      Not performed today      Metals Screen   Yes No Item    X Implanted or lodged metals in body    X Implanted surgical devices    X Metal containing facial or scalp tattoos    X Non removable piercings   Seizure Screen  Yes No Item    X Current Seizure Disorder?    X History of Seizure?     Does patient have a cochlear implant? ____N______  Does patient have any shunts?____N_______  Does patient have a pacemaker?____N______  Does patient have a vagus nerve stimulator?___N_______  Does patient have a deep brain stimulator?____N______  Any other implanted device?________N________       Allergy   Patient has no known allergies.     Vitals                                                  "                                                                            /81   Pulse 80   Temp 97.1  F (36.2  C) (Temporal)   Wt 104.4 kg (230 lb 3.2 oz)   SpO2 100%   BMI 36.13 kg/m        Mental Status Exam                                                                                        Alertness: alert  and oriented  Appearance: well groomed and casually dressed, appropriate for season  Behavior/Demeanor: cooperative, pleasant, and calm, with good  eye contact   Speech: normal and regular rate and rhythm  Language: intact and no problems  Psychomotor: normal or unremarkable  Mood: \"just tired\"  Affect: restricted; was congruent to mood; was congruent to content  Thought Process/Associations: unremarkable  Thought Content:  Reports none;  Denies suicidal ideation and violent ideation  Perception:  Reports none;  Denies auditory hallucinations and visual hallucinations  Insight: good  Judgment: good  Cognition: (6) does  appear grossly intact; formal cognitive testing was not done  Gait and Station: unremarkable     Labs and Data     Rating Scales:        PHQ9 Today:  not collected      2/5/2025     3:18 PM 4/9/2025     2:34 PM 5/12/2025     2:13 PM   PHQ   PHQ-9 Total Score 3  2  4    Q9: Thoughts of better off dead/self-harm past 2 weeks Not at all Not at all Not at all       Patient-reported         No lab results found.  No lab results found.    Diagnosis and Assessment                                                                                  Collette Boss is a 34 year old female with previous psychiatric history of MDD, recurrent, severe who presents for evaluation of depression and discussion of advanced therapeutic options. Diagnostically, Collette leon past and present depressive symptoms seem consistent with a diagnosis of major depressive disorder and Generalized Anxiety Disorder. Depressive symptoms seem to contribute to impaired functioning in the areas of family / partner " relationships , occupational performance, and emotional wellbeing . Precipitating factors may include family history. Perpetuating factors may consist of multiple medication trials, severe anxiety.   I do not see evidence of a comorbid personality disorder, trauma, or other factor that would suggest MDD is a secondary diagnosis/syndrome.    She has a well documented failure of adequate trials of >= 4 antidepressants . The patient has completed an adequate dose of individual psychotherapy. I do think there is augmentation as a possibility, see below.    Patient is burdened by her chronic symptoms of depression and her current episode has lasted over 100 months causing significant psychosocial dysfunction despite multiple trials of psychotropic medications and individual therapy. Due to remaining profound depression and numerous failed previous treatment modalities, the patient is a candidate for rTMS treament. I do not think ketamine or ECT makes sense given the lack of specific suicidal depression.     The risks, benefits, alternatives and potential adverse effects of the above have been explained and are understood by the patient. Collette LOWE Cobb agrees to the treatment plan with the ability to do so. The pt knows to call the clinic for any problems or access emergency care if needed.   Medical considerations relevant to treatment are: Multiple diagnoses suggesting endocrine abnormalities  Substance concerns relevant to treatment are: None, but some addictive vulnerability    During the course of these treatments, the patient will be asked not to make any medication changes.   While waiting to initiate these treatments, it is absolutely reasonable to make medication changes, and suggestions (if any) are below.  After treatment is complete, the patient will transfer back to the referring provider. We have discussed that I am not able to offer long-term medication management.    Suicide Risk Assessment:  Today Collette LOWE  Gera reports no meaningful suicidal ideation. No further action is indicated.       Today the following issues were addressed:    1) Depression    [place] Prescription Monitoring Program [] review was not needed today.    PSYCHOTROPIC DRUG INTERACTIONS: none clinically relevant        Plan                                                                                                                          1) Major depressive disorder with anxious features  -- Medications:   She is not enthusiasitc about medication changes, but if TMS does not work, my suggested algorithm is:  - Raise the bupropion; 600mg is reasonable and it has DA activity that may address the mild anhedonia.  - Add serotonin. It is there via vilazodone, but consider a return to paroxetine (worked well before) or adding dextromethorphan (mimics Auvelity).  - Augmentation; of the classic options, I would suggest T3 but this is gut feeling, lithium would also work, less inclined to anti-D2 given the weight gain concerns.  - Zuranolone; it is more effective for anxious depression (KAZ activity) and she does have those endocrine features.  - Consider a tricyclic, although this is lower priority than augmentation.  - Considering direct dopaminergic augmentation, e.g. pramipexole.          -- Psychotherapy:   Continue regular individual psychotherapy       -- Procedures:   Residual heaviness symptom can respond well to TMS. As her PHQ-9 scores are already low (below 5), we may not be able to capture her symptoms changes well and may depend on her subjective evaluations. She was worried about getting worse before getting better, which she cannot afford at this time due to her parenting demands; however, she expressed that she is willing to give it a try. We are starting the prior authorization process.    -- Referrals: None        CRISIS NUMBERS:   Provided routinely in AVS.    Treatment Risk Statement:  The patient understands the risks, benefits,  adverse effects and alternatives. Agrees to treatment with the capacity to do so. No medical contraindications to treatment. Agrees to call clinic for any problems. The patient understands to call 911 or go to the nearest ED if life threatening or urgent symptoms occur.            PROVIDER:  Ramez Dagher, MD    Time based billing.  Time on day of service includes:  60 min spent face to face with the patient   10 minutes pre-reviewing records  30 minutes preparing consultation note and follow up messages/documentation    Physician Attestation  I, Nolberto Rice MD, saw this patient and agree with the findings and plan of care as documented in the note.      Items personally reviewed/procedural attestation: I was present for and supervised the entire procedure.    Nolberto Rice MD    Physician Attestation  I, Nolberto Rice MD, was present with the medical/[employer] student who participated in the service and in the documentation of the note.  I have verified the history and personally performed the physical exam and medical decision making.  I agree with the assessment and plan of care as documented in the note.      Items personally reviewed: all, directly edited the note.    MD Nolberto Scales MD

## 2025-05-20 NOTE — PATIENT INSTRUCTIONS
Today's Recommendations:  -  Today, we discussed your history of depression and current depressive and anxiety symptoms.  -  We described some possible medication approaches, which would need to be prescribed by your psychiatrist as we do not do medication management in our clinic, but here are our recommendations:  A) Going up on the Wellbutrin  B) Adding something for serotonin synergy, either the Paxil you responded to before, ordextromethorphan  C) Considering zuranolone, a newer agent that is anxiety-specific  -  We recommended transcranial magnetic stimulation (TMS) as a safe interventional approach. It sounds like you'd like to try this. Boilerplate info:    We have decided to try TMS as a next step in your treatment.  This has two steps: getting approval from insurance and being on the wait list for TMS in our clinic.  The first step is two weeks in the simple case. If your insurance denies and we have to appeal, it could take months.  The second is waiting for an open treatment slot. When we have one, someone will call you. If you cannot take that specific time, you will go back on the top of the waiting list. We cannot totally guarantee a specific time slot, but if you have a preference, let the schedulers know and we will try to accommodate.    It is also possible to do TMS without insurance. The approximate cost would be $3500 for 15 sessions (a half course). In general, if you were not getting better by that point, we would stop. However, it is very common for 3 weeks to be a point at which you are partially but not totally better. We would recommend continuing to at least 30 sessions, which would be an additional cost. In general, almost nobody wants this option.    It is perfectly OK to adjust medications or try other treatments while waiting. You may also find a TMS provider closer to you, and it is very much OK to get treatment through them.  You are always welcome to call or send a MyChart to ask  "about status updates.  The waiting process can be long and frustrating when you have this severe a symptom level. IF YOU ARE GETTING WORSE, CALL A CRISIS NUMBER (below) OR GO TO AN EMERGENCY ROOM. TMS is not an emergency treatment, and the first priority is keeping you stable while we are getting set up.    A standard TMS treatment course is 6 weeks of daily treatment, Monday through Friday. Treatments can take anywhere from 10 to 30 minutes, depending on the exact protocol we use.  All of the protocols involve having a magnet next to your head, sending rapid electro-magnetic pulses targeted to a specific spot in your brain. The exact biological mechanism is unknown. What we think is happening is that we are altering brain pathways involved in cognition, self-regulation, mindfulness, and related executive functions.  Common side effects during the procedure and immediately after are a mild headache, dizziness, and a sense of tingling in your scalp. Most of these go away quite quickly, within an hour of treatment. It is possible to have a seizure during TMS, which is more common if you take certain medications. We carefully monitor and adjust dosing to prevent this, and in our clinic, the risk of a seizure is less than 1 in 10,000.  The chance of TMS working is a bit over 50%, and if it does, most people experience a benefit that lasts for about a year. This is why we usually favor TMS as a first treatment -- it is very safe, very tolerable, and produces long-lasting results.    We monitor your progress throughout TMS treatment. If you are not improving after about 3 weeks, we will usually change to a slightly different TMS protocol. This can extend the treatment course beyond the usual 1.5 months.        We are specifically a university and research clinic, and there are often research studies ongoing. Some of those are clinical trials of new brain stimulation treatments. Others are what we would call \"biomarker\" " "studies, where we ask you to participate in some kind of measurement while you are undergoing regular treatment.   If you are interested in hearing about research consider signing up for our research registry. This will allow researchers in our clinic to reach out if you become eligible for a study. Sign up today at [email or web address].link/[email or web address]_Registry    In some cases, a clinical trial is the only way to get access to an advanced treatment that is not covered by your insurance. Usually, we will talk about this in your visit if it is an option.      Patient Education       General Information:  Our Treatment-Resistant Disorders/Interventional Psychiatry clinic is what is often called a \"consultation\" or \"tertiary care\" clinic. That means we do not see patients long-term for medication management or talk therapy. We offer thorough evaluations, recommendations about medications/therapies you may have not yet tried, and in some cases, brain stimulation or office-based treatments. If you are likely to benefit from one of those advanced treatments, we will have talked about it today. Once that treatment is complete, we will see you once or twice afterwards to check in, and then you will return to getting ongoing psychiatric care from whomever you were seeing before you came for your evaluation with us. If for some reason you no longer have access to that clinician, we can help with a referral to our main [facility]. The only patients we see long-term are patients with implanted medical devices that require ongoing monitoring and programming.     Our recommendations almost always include some kind of cognitive-behavioral therapy (CBT) if you are not getting it already. Brain and nerve stimulation treatments work best when combined with certain talk therapies. We make these recommendations because we strongly believe that, without the therapy piece, most people will not get better, or will have limited clinical " benefit. It is often difficult or inconvenient to add therapy to an already busy schedule, but it is also necessary.    It is important to remember that, like all mental health treatments, our interventional therapies are not perfect. About one third of people will not feel better after treatment, and even when they work, they do not take away the symptoms entirely.If we have recommended something above, it means we think there is a better than even chance of it working, but things are never guaranteed. This is another reason we usually recommend CBT along with our advanced treatments -- it can address the symptoms that remain after the stimulation/ketamine treatment.       While we are waiting to implement the recommendations you and I have discussed, you should know what to do if your symptoms worsen. A variety of crisis numbers/resources are available. They include:    CRISIS GENERAL NUMBERS   0-062-SNBNHGI (1-340.109.1788)  OR  911     CRISIS INTERVENTION TEAM (CIT) - this is a POLICE UNIT, specially trained.  This website has information for all of [place]'s CITs. Lays out which areas have this team.  Http://cit.[email or web address]/[place].php  However, one can just call 911 and ask for this special team.   If police need to be called, DO ask for this team.    CRISIS MOBILE TEAMS  [and see end of this phrase*]  [facility] -COPE: 24hrs/7days:    [phone]  (Adults > 17yo)    [phone]  (Child   < 18yo)                                       FRONT DOOR (during the day could call)   [phone]    [facility] -[phone] (Adult)  [phone] (Child)     [facility] -[phone] (Adult and Child)     [facility] -[phone] ([facility] mobile crisis team; Adult and Child; 24hr)     [facility] -[phone] (Adult and Child)     CRISIS HOUSING  [address]               "[phone]  [facility]                                [address]                       [phone]  [facility]                               [address]     [phone]   [facility]  [address]                [phone]    [place] EMERGENCY NUMBERS    Crisis Connection:                                [phone]  [facility]:     [phone]  Crisis Intervention:                                [phone] or [phone]   COPE: Mobile Team 24hrs/7days:    [phone]  (Adults > 19yo)                                                                            [phone]  (Child   < 18yo)  [facility]        [phone] 24/7 line and Mobile Team   [address] [place] [address]  DROP IN:  M-F: 8:00 am - 7:00 pm  Sat: 11:00 am - 3:00 pm   Sun: Closed     WALK-IN COUNSELING:  [facility]       [phone]    [address]:   M, W, F:  1:00-3:00PM    M-Th:  6:30-8:30PM    TRANS and LGBT  Call [employer] at [phone]. This service is staffed by trans people 24/7.   LGBT youth <24 contemplating suicide, call the [employer] [phone].    POISON CONTROL CENTER  [phone]    OR  go to nearest ER    CHILD  \"[facility] has a needs assessment team who will do an intake evaluation. Based on the results of the intake they will recommended inpatient admission, partial hospitalization, intensive outpatient or outpatient care. The number is [phone]. \"    CRISIS TEXT LINE  Http://www.crisistextline.org  FREE SUPPORT AT YOUR FINGERTIPS, 24/7  Crisis Text Line serves anyone, in any type of crisis, providing access to free, 24/7 support and information via the medium people already use and trust: text. Here s how it works:  1)  Text [phone] from anywhere in the [place], anytime, about " "any type of crisis.  2)  A live, trained Crisis Counselor receives the text and responds quickly.      The volunteer Crisis Counselor will help you move from a 'hot moment to a cool moment'    [facility] EMERGENCY NUMBERS    Crisis Connection:                                [phone]  [facility]:              [phone]  Crisis Intervention:                                [phone] or [phone]   COPE: Mobile Team 24hrs/7days:    [phone]  (Adults > 17yo)                                                                            [phone]  (Child   < 16yo)  [facility]        [phone]  CALL 24 hours a day.  [address] [place] [address]  DROP IN:  M-F: 8:00 am - 7:00 pm  Sat: 11:00 am - 3:00 pm   Sun: Closed    WALK-IN COUNSELIN)  [facility]                                  [phone]        [facility] [address] [place] [address] Ave:                  M, W:      5:00-7:00PM       2)  [facility]                            [phone]        [place], [address] [facility]                T, Th:      6:00-8:00PM    TRANS and LGBT  Call [employer] at [phone]. This service is staffed by trans people .   LGBT youth <24 contemplating suicide, call the [employer] [phone].    POISON CONTROL CENTER  [phone]    OR  go to nearest ER    CHILD  \"[facility] has a needs assessment team who will do an intake evaluation. Based on the results of the intake they will recommended inpatient admission, partial hospitalization, intensive outpatient or outpatient care. The number is [phone] or [phone]. \"    CRISIS TEXT LINE  Http://www.crisistextline.org  FREE SUPPORT AT YOUR FINGERTIPS,   Crisis Text Line serves anyone, in any type of crisis, providing access to free,  support and information via the medium people already use and trust: text. Here s how it works:  1)  Text [phone] from anywhere in the [place], " anytime, about any type of crisis.  2)  A live, trained Crisis Counselor receives the text and responds quickly.      The volunteer Crisis Counselor will help you move from a 'hot moment to a cool moment'      * A Community Paramedic (CP) is an advanced paramedic that works to increase access to primary and preventive care and decrease use of emergency departments, which in turn decreases health care costs. Among other things, CPs may play a key role in providing follow-up services after a hospital discharge to prevent hospital readmission. CPs can provide health assessments, chronic disease monitoring and education, medication management, immunizations and vaccinations, laboratory specimen collection, hospital discharge follow-up care and minor medical procedures. CPs work under the direction of an Ambulance Medical Director.

## 2025-06-03 ENCOUNTER — VIRTUAL VISIT (OUTPATIENT)
Dept: FAMILY MEDICINE | Facility: CLINIC | Age: 35
End: 2025-06-03
Payer: COMMERCIAL

## 2025-06-03 DIAGNOSIS — K21.9 GASTROESOPHAGEAL REFLUX DISEASE, UNSPECIFIED WHETHER ESOPHAGITIS PRESENT: ICD-10-CM

## 2025-06-03 DIAGNOSIS — E66.812 CLASS 2 SEVERE OBESITY DUE TO EXCESS CALORIES WITH SERIOUS COMORBIDITY AND BODY MASS INDEX (BMI) OF 36.0 TO 36.9 IN ADULT (H): Primary | ICD-10-CM

## 2025-06-03 DIAGNOSIS — Z86.32 HISTORY OF GESTATIONAL DIABETES MELLITUS (GDM): ICD-10-CM

## 2025-06-03 DIAGNOSIS — Z87.59 HISTORY OF PRE-ECLAMPSIA: ICD-10-CM

## 2025-06-03 DIAGNOSIS — E66.01 CLASS 2 SEVERE OBESITY DUE TO EXCESS CALORIES WITH SERIOUS COMORBIDITY AND BODY MASS INDEX (BMI) OF 36.0 TO 36.9 IN ADULT (H): Primary | ICD-10-CM

## 2025-06-03 DIAGNOSIS — E78.2 MIXED HYPERLIPIDEMIA: ICD-10-CM

## 2025-06-03 PROCEDURE — 98005 SYNCH AUDIO-VIDEO EST LOW 20: CPT | Performed by: FAMILY MEDICINE

## 2025-06-03 NOTE — PROGRESS NOTES
Collette is a 35 year old who is being evaluated via a billable video visit.    What phone number would you like to be contacted at? [phone]  How would you like to obtain your AVS? Christ      Assessment & Plan     Class 2 severe obesity due to excess calories with serious comorbidity and body mass index (BMI) of 36.0 to 36.9 in adult (H)  36yo female with a history of obesity, GDM, preeclampsia, hyperlipidemia, and GERD. She has been working on lifestyle changes including diet and exercise for over one year without significant weight loss. We discussed options for weight management including medications for weight management, and she is interested in a GLP1 agonist. We discussed common side effects, dosing schedule, insurance coverage. All questions answered. Will start with wegovy 0.25mg weekly - she ralf let us know if any issues filling, and will also let us know when she is done with first month if she is ready to increase dose.  - semaglutide-weight management (WEGOVY) 0.25 MG/0.5ML pen; Inject 0.5 mLs (0.25 mg) subcutaneously once a week.    History of gestational diabetes mellitus (GDM)  2h GTT postpartum was normal - she is due for A1c. Discussed increased risk of diabetes in future. Weight loss will help mitigate this risk.  - semaglutide-weight management (WEGOVY) 0.25 MG/0.5ML pen; Inject 0.5 mLs (0.25 mg) subcutaneously once a week.  - Hemoglobin A1c; Future    History of pre-eclampsia  Bps have been normal since delivery. Weight loss will help mitigate future risk of CVD.  - semaglutide-weight management (WEGOVY) 0.25 MG/0.5ML pen; Inject 0.5 mLs (0.25 mg) subcutaneously once a week.    Mixed hyperlipidemia  Last lipids 1/2024 - she will come in for fasting lipids. Weight loss will help lower cholesterol.  - semaglutide-weight management (WEGOVY) 0.25 MG/0.5ML pen; Inject 0.5 mLs (0.25 mg) subcutaneously once a week.  - Lipid panel reflex to direct LDL Fasting; Future    Gastroesophageal reflux  "disease, unspecified whether esophagitis present  Weight loss will help alleviate GERD symptoms.  - semaglutide-weight management (WEGOVY) 0.25 MG/0.5ML pen; Inject 0.5 mLs (0.25 mg) subcutaneously once a week.          BMI  Estimated body mass index is 36.13 kg/m  as calculated from the following:    Height as of 4/28/25: 1.7 m (5' 6.93\").    Weight as of 5/20/25: 104.4 kg (230 lb 3.2 oz).   Weight management plan: Discussed healthy diet and exercise guidelines , start semaglutide      Follow up as scheduled in September for weight management    Subjective   Collette is a 35 year old, presenting for the following health issues:  Weight Management      6/3/2025     3:20 PM   Additional Questions   Roomed by MIKEY Recinos   Accompanied by self     History of Present Illness       Reason for visit:  Wt management       Didn't struggle with weight growing up  Over past 10y weight has been worse - hard to lose weight  Vegetarian, eats well - met with nutritionist several times; saw nutritionist when she got pregnant throughout second trimester; also saw someone many years ago. Nutritionist taught her that she wasn't feeling enough good food to feel full.  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  Started personal training   recently started GLP1 (he has obesity and HTN) and she is wondering if it would be a good option  She feels like she has benefited from his healthy habits after starting GLP1 - Increase protein in meals, more walks with dog and daughter  Perimenopause has added to situation, feels like depression medication also a barrier - both make weight loss hard  She feels like weight prevents her from being an active and engaged mom                      Review of Systems  Constitutional, HEENT, cardiovascular, pulmonary, gi and gu systems are negative, except as otherwise noted.      Objective           Vitals:  No vitals were obtained " "today due to virtual visit.  Wt Readings from Last 3 Encounters:   05/20/25 104.4 kg (230 lb 3.2 oz)   04/28/25 104.3 kg (230 lb)   07/01/24 106.8 kg (235 lb 8 oz)     Estimated body mass index is 36.13 kg/m  as calculated from the following:    Height as of 4/28/25: 1.7 m (5' 6.93\").    Weight as of 5/20/25: 104.4 kg (230 lb 3.2 oz). This was an in person visit with psychiatry - most recent weight    Physical Exam   GENERAL: alert and no distress  EYES: Eyes grossly normal to inspection.  No discharge or erythema, or obvious scleral/conjunctival abnormalities.  RESP: No audible wheeze, cough, or visible cyanosis.    SKIN: Visible skin clear. No significant rash, abnormal pigmentation or lesions.  NEURO: Cranial nerves grossly intact.  Mentation and speech appropriate for age.  PSYCH: Appropriate affect, tone, and pace of words          Video-Visit Details    Type of service:  Video Visit   Originating Location (pt. Location): Home    Distant Location (provider location):  On-site  Platform used for Video Visit: Anuj  Signed Electronically by: Yesy Mays MD    "

## 2025-06-16 ENCOUNTER — TELEPHONE (OUTPATIENT)
Dept: PSYCHIATRY | Facility: CLINIC | Age: 35
End: 2025-06-16

## 2025-06-16 NOTE — TELEPHONE ENCOUNTER
Collette called in wanting to check the status of her pre auth determination for TMS. She is hoping for a call back today to get an update on this.

## 2025-06-16 NOTE — TELEPHONE ENCOUNTER
Returned call to patient to update on prior auth process. We are still waiting to hear back from insurance at this time, informed patient this can take a couple weeks. Writer will call patient as soon as determination is made.

## 2025-06-24 ENCOUNTER — LAB (OUTPATIENT)
Dept: LAB | Facility: CLINIC | Age: 35
End: 2025-06-24
Payer: COMMERCIAL

## 2025-06-24 DIAGNOSIS — E78.2 MIXED HYPERLIPIDEMIA: ICD-10-CM

## 2025-06-24 DIAGNOSIS — Z86.32 HISTORY OF GESTATIONAL DIABETES MELLITUS (GDM): ICD-10-CM

## 2025-06-24 LAB
CHOLEST SERPL-MCNC: 236 MG/DL
EST. AVERAGE GLUCOSE BLD GHB EST-MCNC: 100 MG/DL
FASTING STATUS PATIENT QL REPORTED: YES
HBA1C MFR BLD: 5.1 % (ref 0–5.6)
HDLC SERPL-MCNC: 56 MG/DL
LDLC SERPL CALC-MCNC: 139 MG/DL
NONHDLC SERPL-MCNC: 180 MG/DL
TRIGL SERPL-MCNC: 203 MG/DL

## 2025-06-24 PROCEDURE — 80061 LIPID PANEL: CPT

## 2025-06-24 PROCEDURE — 36415 COLL VENOUS BLD VENIPUNCTURE: CPT

## 2025-06-24 PROCEDURE — 83036 HEMOGLOBIN GLYCOSYLATED A1C: CPT

## 2025-06-25 ENCOUNTER — RESULTS FOLLOW-UP (OUTPATIENT)
Dept: FAMILY MEDICINE | Facility: CLINIC | Age: 35
End: 2025-06-25

## 2025-07-14 ENCOUNTER — MYC MEDICAL ADVICE (OUTPATIENT)
Dept: FAMILY MEDICINE | Facility: CLINIC | Age: 35
End: 2025-07-14
Payer: COMMERCIAL

## 2025-07-14 ENCOUNTER — TELEPHONE (OUTPATIENT)
Dept: FAMILY MEDICINE | Facility: CLINIC | Age: 35
End: 2025-07-14
Payer: COMMERCIAL

## 2025-07-14 NOTE — TELEPHONE ENCOUNTER
A prior authorization started.  Patient updated.    Juan José Andujar RN   [facility]

## 2025-07-14 NOTE — TELEPHONE ENCOUNTER
Prior Authorization Specialty Medication Request    Medication/Dose: semaglutide-weight management (WEGOVY) 0.25 MG/0.5ML pen   Diagnosis and ICD code (if different than what is on RX):  E66.812, E66.01  New/renewal/insurance change PA/secondary ins. PA:  Previously Tried and Failed:  n/a    Important Lab Values: n/a  Rationale: n/a    Insurance   Primary: AETNA  Insurance ID:  [id number]    Secondary (if applicable):BCBS OUT OF STATE  Insurance ID:  [id number]    Pharmacy Information (if different than what is on RX)  Name:    [id number] [place], [place] - [address]     Phone:  [phone]   Fax:[phone]     Clinic Information  Preferred routing pool for dept communication: prior authorization    Juan José Andujar RN   [facility]

## 2025-07-14 NOTE — TELEPHONE ENCOUNTER
Juan José Andujar, KATHRYN routed this conversation to [facility] (Selected Message)  Juan José Andujar RN      7/14/25  1:07 PM  Note  Prior Authorization Specialty Medication Request     Medication/Dose: semaglutide-weight management (WEGOVY) 0.25 MG/0.5ML pen   Diagnosis and ICD code (if different than what is on RX):  E66.812, E66.01  New/renewal/insurance change PA/secondary ins. PA:  Previously Tried and Failed:  n/a     Important Lab Values: n/a  Rationale: n/a     Insurance   Primary: AETNA  Insurance ID:  [id number]     Secondary (if applicable):BCBS OUT OF STATE  Insurance ID:  [id number]     Pharmacy Information (if different than what is on RX)  Name:    [id number] - [place], [place] - [address]      Phone:  [phone]   Fax:[phone]      Clinic Information  Preferred routing pool for dept communication: prior authorization     Juan José Andujar RN   [facility]                    Collette Boss to [facility] - Primary Care (supporting Yesy Mays MD)        7/14/25 11:47 AM  Dear Dr. Mays,     I am still waiting for the preauthorization for Wegovy. Just following up because I am confused where things stand at the moment. The pharmacy is saying that the doctor s office needs to send pre authorization to insurance but maybe this has already been done and insurance is just taking their time?     Thank you,     Collette

## 2025-07-17 NOTE — TELEPHONE ENCOUNTER
PA Initiation    Medication: WEGOVY 0.25 MG/0.5ML SC SOAJ  Insurance Company: [employer] Non-Specialty PA's - Phone [phone] Fax [phone]  Pharmacy Filling the Rx: [id number] [place], [place] - [address]  Filling Pharmacy Phone: [phone]  Filling Pharmacy Fax: [phone]  Start Date: 7/17/2025

## 2025-07-17 NOTE — TELEPHONE ENCOUNTER
Prior Authorization Approval    Medication: WEGOVY 0.25 MG/0.5ML SC SOAJ  Authorization Effective Date: 6/17/2025  Authorization Expiration Date: 2/12/2026  Approved Dose/Quantity:   Reference #:     Insurance Company: [employer] Non-Specialty PA's - Phone [phone] Fax [phone]  Expected CoPay: $    CoPay Card Available:      Financial Assistance Needed:   Which Pharmacy is filling the prescription: [id number] - [place], [place] - [address]  Pharmacy Notified: YES  Patient Notified: **Instructed pharmacy to notify patient when script is ready to /ship.**

## 2025-07-17 NOTE — TELEPHONE ENCOUNTER
Per pharmacy processing info is    BIN [id number]  [facility] A4  GROUP [id number]  ID [id number]

## 2025-07-21 DIAGNOSIS — F33.0 MILD EPISODE OF RECURRENT MAJOR DEPRESSIVE DISORDER: ICD-10-CM

## 2025-07-21 RX ORDER — BUPROPION HYDROCHLORIDE 300 MG/1
300 TABLET ORAL EVERY MORNING
Qty: 90 TABLET | Refills: 0 | Status: SHIPPED | OUTPATIENT
Start: 2025-07-21

## 2025-07-22 ENCOUNTER — ALLIED HEALTH/NURSE VISIT (OUTPATIENT)
Dept: PSYCHIATRY | Facility: CLINIC | Age: 35
End: 2025-07-22
Payer: COMMERCIAL

## 2025-07-22 ENCOUNTER — OFFICE VISIT (OUTPATIENT)
Dept: PSYCHIATRY | Facility: CLINIC | Age: 35
End: 2025-07-22
Payer: COMMERCIAL

## 2025-07-22 DIAGNOSIS — F33.1 MODERATE EPISODE OF RECURRENT MAJOR DEPRESSIVE DISORDER (H): Primary | ICD-10-CM

## 2025-07-22 DIAGNOSIS — F33.1 MAJOR DEPRESSIVE DISORDER, RECURRENT EPISODE, MODERATE (H): Primary | ICD-10-CM

## 2025-07-22 ASSESSMENT — PATIENT HEALTH QUESTIONNAIRE - PHQ9: SUM OF ALL RESPONSES TO PHQ QUESTIONS 1-9: 11

## 2025-07-22 NOTE — PROGRESS NOTES
TMS Education     Collette Boss MRN# [id number]  Age: 35 year old year old YOB: 1990        Patient is here in clinic for TMS education and consenting.  Patient will be starting TMS today on the [employer].  Writer and patient reviewed mechanics of TMS.  Discussed using magnetic pulses to stimulate and induce an electrical field inside the brain.  Discussed the difference between F8 and H1 coil.  Patient was able to verbalize understanding of this.  Discussed that the idea is that we are treating the DLPFC of the brain, as it has been shown by in studies that people who have depression have decreased activity in this part of the brain, the idea is that we stimulate this part of the brain to increase activity.       Reviewed processing of mapping and how visit today would go.  Encouraged patient to communicate with staff if treatment at anytime became painful.         Discussed side effects of TMS.  Side effects include headaches after treatment, hearing loss, lightheadedness/dizziness, short lived vision changes, and seizures.  Discussed ways that TMS Clinic helps with preventing these side effects.  Such as retesting motor thresholds and having patient wear ear plugs.  Instructed patient that she can take OTC pain relievers such as tylenol or IBU if she has a headache after today's treatment.  Reviewed safety concerns regarding sleep and use of illegal drugs/alcohol.        Patient was able to ask questions regarding procedure.  Patient informed of 10 minute late policy and attendance policy.  Consent was signed by patient today.

## 2025-07-22 NOTE — PROGRESS NOTES
Interventional Psychiatry Program  [address] [facility] [address], [address]  [place] [address]  TMS Procedure Note   Collette Boss MRN# [id number]  Age: 35 year old year old YOB: 1990    Collette Boss comes into clinic today at the request of Nolberto Rice MD Ordering Provider for TMS.    Pre-Procedure:  History and Physical: Reviewed in medical record  Consent Signed by: Collette Boss for this course of treatment.  On: 07/22/2025    Reviewed possible side effects associated with treatment as well as questions regarding possible course of treatments.  Patient agreed to procedure and was able to reflect implications.    Clinical Narrative:  Patient tolerated treatment. Patient presents to initiate an acute course of TMS for management of her symptoms of resistant depression.    Indications for TMS:  MDD, recurrent, severe; 4+ medication trials (from 2+ classes) ineffective; Psychotherapy ineffective     Procedure Diagnosis:  No diagnosis found.    Treatment Hx:  Treatment number this series: 1  Total lifetime treatment number: 1    No Known Allergies   LMP  (LMP Unknown)     Pause for the Cause  Right patient: Yes  Right procedure/correct coil:  Yes; rTMS; [id number]; H1 coil.   Earplugs in place:  Yes    Procedure  Patient was seated in procedure chair. Identity and procedure was verified. Ear plugs were placed in ears and patient-specific cap was placed on head and tightened appropriately. Ruler locations were verified. Bone conducting headphones were not used.  Coil was placed at 0 - eyebrow - 15.5 and stimulator was set to 59% (80% of MT).  Initial train was well tolerated so 55 trains were delivered.  Patient tolerated procedure well with minimal right eyebrow movement.    Motor Threshold Determination  Distance from nasion to inion: 37cm  MT 1: 0 - 7 - 15.5 @ 74% on 07/22/2025    Standard LDLPFC    Frequency: 18  Train Duration: 2  Total pulses delivered: 1980  Inter-train interval: 20  Tx Loc: 0 -  eye - 15.5  Energy: 59% (80%MT)  Trains: 55            4/9/2025     2:34 PM 5/12/2025     2:13 PM 7/22/2025     3:02 PM   PHQ-9 SCORE   PHQ-9 Total Score MyChart 2 (Minimal depression) 4 (Minimal depression)    PHQ-9 Total Score 2  4  11       Patient-reported     Date MADRS QIDS PHQ-9   07/22/25 23 13 11    --          Plan   - Continue treatments   - Increase energy as tolerated    The service provided today was under the supervisory provider of the day PERI Nieto MD, who determined the motor threshold and was available if needed.     Rosalinda Faria, TMS Technician  [facility]

## 2025-07-23 ENCOUNTER — OFFICE VISIT (OUTPATIENT)
Dept: PSYCHIATRY | Facility: CLINIC | Age: 35
End: 2025-07-23
Payer: COMMERCIAL

## 2025-07-23 DIAGNOSIS — F33.1 MAJOR DEPRESSIVE DISORDER, RECURRENT EPISODE, MODERATE (H): Primary | ICD-10-CM

## 2025-07-23 ASSESSMENT — PATIENT HEALTH QUESTIONNAIRE - PHQ9: SUM OF ALL RESPONSES TO PHQ QUESTIONS 1-9: 9

## 2025-07-23 NOTE — PROGRESS NOTES
Interventional Psychiatry Program  [address] [facility] [address], [address]  [place] [address]  TMS Procedure Note   Collette Boss MRN# [id number]  Age: 35 year old  YOB: 1990    Collette Boss comes into clinic today at the request of, Nolberto Rice MD, ordering provider for TMS treatments.    Pre-Procedure:  History and Physical: Reviewed in medical record  Consent signed by: Collette Boss for this course of treatment on: 07/22/2025    Clinical Narrative:  Patient tolerated treatment. Tolerated increase to 90%.    Indications for TMS:  MDD, recurrent, severe; 4+ medication trials (from 2+ classes) ineffective; Psychotherapy ineffective     Procedure Diagnosis:  MDD, severe, recurrent F33.2    Treatment Hx:  Treatment number this series: 2  Total lifetime treatment number: 2    No Known Allergies   LMP  (LMP Unknown)     Pause for the Cause  Right patient: Yes  Right procedure/correct coil:  Yes; rTMS; cpt 40935; H1 coil.   Earplugs in place:  Yes    Procedure  Patient was seated in procedure chair. Identity and procedure was verified. Ear plugs were placed in ears and patient-specific cap was placed on head and tightened appropriately. Ruler locations were verified. Bone conducting headphones were not used. Coil was placed at 0 - eyebrow - 15.5 and stimulator was set to 67% (90% of MT). Initial train was well tolerated so 55 trains were delivered. Patient tolerated procedure well with minimal right eyebrow movement.    Motor Threshold Determination  Distance from nasion to inion: 37cm  MT 1: 0 - 7 - 15.5 @ 74% on 07/22/2025    Standard LDLPFC  Frequency: 18  Train Duration: 2  Total pulses delivered: 1980  Inter-train interval: 20  Tx Loc: 0 - eye - 15.5  Energy: 67% (90% MT)  Trains: 55          4/9/2025     2:34 PM 5/12/2025     2:13 PM 7/22/2025     3:02 PM   PHQ-9 SCORE   PHQ-9 Total Score MyChart 2 (Minimal depression) 4 (Minimal depression)    PHQ-9 Total Score 2  4  11       Patient-reported      Date MADRS QIDS PHQ-9   07/22/25 23 13 11    --          Plan   - Continue TMS treatments   - Increase energy as tolerated    The service provided today was under the supervisory provider of the day, Carlos Angulo MD, who was available if needed.     Norma Paiz, TMS Technician  [facility]

## 2025-07-24 ENCOUNTER — OFFICE VISIT (OUTPATIENT)
Dept: PSYCHIATRY | Facility: CLINIC | Age: 35
End: 2025-07-24
Payer: COMMERCIAL

## 2025-07-24 DIAGNOSIS — F33.1 MAJOR DEPRESSIVE DISORDER, RECURRENT EPISODE, MODERATE (H): Primary | ICD-10-CM

## 2025-07-24 DIAGNOSIS — F41.1 GAD (GENERALIZED ANXIETY DISORDER): ICD-10-CM

## 2025-07-24 ASSESSMENT — PATIENT HEALTH QUESTIONNAIRE - PHQ9: SUM OF ALL RESPONSES TO PHQ QUESTIONS 1-9: 10

## 2025-07-24 NOTE — PROGRESS NOTES
Interventional Psychiatry Program  [address], [address]  [place] [address]  TMS Procedure Note   Collette Boss MRN# [id number]  Age: 35 year old  YOB: 1990    Collette Boss comes into clinic today at the request of, Nolberto Rice MD, ordering provider for TMS treatments.    Pre-Procedure:  History and Physical: Reviewed in medical record  Consent signed by: Collette Boss for this course of treatment on: 07/22/2025    Clinical Narrative:  Patient tolerated treatment. Tolerated increase to 100%.    Indications for TMS:  MDD, recurrent, severe; 4+ medication trials (from 2+ classes) ineffective; Psychotherapy ineffective     Procedure Diagnosis:  MDD, severe, recurrent F33.2    Treatment Hx:  Treatment number this series: 3  Total lifetime treatment number: 3    No Known Allergies   LMP  (LMP Unknown)     Pause for the Cause  Right patient: Yes  Right procedure/correct coil:  Yes; rTMS; cpt 56260; H1 coil.   Earplugs in place:  Yes    Procedure  Patient was seated in procedure chair. Identity and procedure was verified. Ear plugs were placed in ears and patient-specific cap was placed on head and tightened appropriately. Ruler locations were verified. Bone conducting headphones were not used. Coil was placed at 0 - eyebrow - 15.5 and stimulator was set to 74% (100% of MT). Initial train was well tolerated so 55 trains were delivered. Patient tolerated procedure well with minimal right eyebrow movement.    Motor Threshold Determination  Distance from nasion to inion: 37cm  MT 1: 0 - 7 - 15.5 @ 74% on 07/22/2025    Standard LDLPFC  Frequency: 18  Train Duration: 2  Total pulses delivered: 1980  Inter-train interval: 20  Tx Loc: 0 - eye - 15.5  Energy: 74% (100% MT)  Trains: 55          7/22/2025     3:02 PM 7/23/2025    10:06 AM 7/24/2025    10:02 AM   PHQ-9 SCORE   PHQ-9 Total Score 11 9 10     Date MADRS QIDS PHQ-9   07/22/25 23 13 11    --          Plan   - Continue TMS treatments   -  Increase energy as tolerated    The service provided today was under the supervisory provider of the day, PERI Nieto MD, who was available if needed.     Rosalinda Faria, TMS Technician  [facility]

## 2025-07-28 ENCOUNTER — OFFICE VISIT (OUTPATIENT)
Dept: PSYCHIATRY | Facility: CLINIC | Age: 35
End: 2025-07-28
Payer: COMMERCIAL

## 2025-07-28 DIAGNOSIS — F33.1 MAJOR DEPRESSIVE DISORDER, RECURRENT EPISODE, MODERATE (H): Primary | ICD-10-CM

## 2025-07-28 RX ORDER — GABAPENTIN 100 MG/1
100-300 CAPSULE ORAL
Qty: 90 CAPSULE | Refills: 0 | Status: SHIPPED | OUTPATIENT
Start: 2025-07-28

## 2025-07-28 ASSESSMENT — PATIENT HEALTH QUESTIONNAIRE - PHQ9: SUM OF ALL RESPONSES TO PHQ QUESTIONS 1-9: 14

## 2025-07-28 NOTE — PROGRESS NOTES
Interventional Psychiatry Program  [address] [facility] [address] [facility], [address]  [place] [address]  TMS Procedure Note   Collette Boss MRN# [id number]  Age: 35 year old  YOB: 1990    Collette Boss comes into clinic today at the request of, Nolberto Rice MD, ordering provider for TMS treatments.    Pre-Procedure:  History and Physical: Reviewed in medical record  Consent signed by: Collette Boss for this course of treatment on: 07/22/2025    Clinical Narrative:  Patient tolerated treatment. [place] the zoo [place].    Indications for TMS:  MDD, recurrent, severe; 4+ medication trials (from 2+ classes) ineffective; Psychotherapy ineffective     Procedure Diagnosis:  MDD, severe, recurrent F33.2    Treatment Hx:  Treatment number this series: 5  Total lifetime treatment number: 5    No Known Allergies   LMP  (LMP Unknown)     Pause for the Cause  Right patient: Yes  Right procedure/correct coil:  Yes; rTMS; cpt 47151; H1 coil.   Earplugs in place:  Yes    Procedure  Patient was seated in procedure chair. Identity and procedure was verified. Ear plugs were placed in ears and patient-specific cap was placed on head and tightened appropriately. Ruler locations were verified. Bone conducting headphones were not used. Coil was placed at 0 - eyebrow - 15.5 and stimulator was set to 81% (110% of MT). Initial train was well tolerated so 55 trains were delivered. Patient tolerated procedure well with minimal right eyebrow movement.    Motor Threshold Determination  Distance from nasion to inion: 37cm  MT 1: 0 - 7 - 15.5 @ 74% on 07/22/2025    Standard LDLPFC  Frequency: 18  Train Duration: 2  Total pulses delivered: 1980  Inter-train interval: 20  Tx Loc: 0 - eye - 15.5  Energy: 81% (110% MT)  Trains: 55          7/23/2025    10:06 AM 7/24/2025    10:02 AM 7/25/2025    10:19 AM   PHQ-9 SCORE   PHQ-9 Total Score 9 10 14     Date MADRS QIDS PHQ-9   07/22/25 23 13 11   7/25/25  13 14        Plan   - Continue  TMS treatments   - Increase energy as tolerated    The service provided today was under the supervisory provider of the day, Carlos Angulo MD, who was available if needed.     Norma Paiz TMS Technician  [facility]

## 2025-07-29 ENCOUNTER — OFFICE VISIT (OUTPATIENT)
Dept: PSYCHIATRY | Facility: CLINIC | Age: 35
End: 2025-07-29
Payer: COMMERCIAL

## 2025-07-29 DIAGNOSIS — F33.1 MAJOR DEPRESSIVE DISORDER, RECURRENT EPISODE, MODERATE (H): Primary | ICD-10-CM

## 2025-07-29 ASSESSMENT — PATIENT HEALTH QUESTIONNAIRE - PHQ9: SUM OF ALL RESPONSES TO PHQ QUESTIONS 1-9: 10

## 2025-07-29 NOTE — PROGRESS NOTES
Virtual Visit Details    Type of service:  Video Visit     Originating Location (pt. Location): Home    Distant Location (provider location):  Off-site  Platform used for Video Visit: [facility]  MEDICAL PROGRESS NOTE     Collette Boss is a  35 year old  referred by Yesy Simeon for evaluation of mental health..    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    Today, Collette is currently on TMS treatment and at the start of her second week. Feels hopeful about its potential benefit and also grounded about the possibility that it might not be as effective as she hopes. Currently, she 's starting to feel more like herself and feels like the mother she always wanted to eb to Montserrat. Bonding is better than it was and she finds domonique in being a mother. We plan to have our next follow up a week after the completion of TMS. No SI, HI, or acute mental safety concerns.    Safety Risk-Collette did not appear to be an imminent safety risk to self or others.    Psychotropic Drug Interactions:  [PSYCHCLINICDDI]  FLUoxetine may enhance the neuroexcitatory and/or seizure-potentiating effect of BuPROPion. BuPROPion may increase the serum concentration of FLUoxetine.   BuPROPion may enhance the adverse/toxic effect of Vilazodone   CNS Depressants(Gabapentin) may enhance the adverse/toxic effect of  Selective Serotonin Reuptake Inhibitors. Specifically, the risk of psychomotor impairment may be enhanced.     Management: limit med redundancy    [place] was [place] today: not using controlled substances       Plan     1) Medications:   - Continue Viibryd 20mg daily  - Continue Wellbutrin XR 300mg daily  - Continue Gabapentin 100-300mg at bedtime PRN (can take 100mg during the day as needed)    2) Psychotherapy: Continue individual therapy    3) Next due:  Labs- Routine monitoring is not indicated for current psychotropic medication regimen   EKG- Routine monitoring is not indicated for current psychotropic medication regimen   Rating scales- PHQ-9, ALLI-7    4) Referrals: Specialty Program- SLP/TRD clinic    5) Other: none    6) Follow-up: Return to clinic in 7 weeks         Pertinent Background                                                   [most recent eval 05/29/24]     Collette first experienced mental health symptoms of anxiety as an undergraduate student. Had a lot of test anxiety with IBS. Started to experience depression 10 years ago(2014). In that period, she lived [place] and drank heavily. She used Paxil which was helpful for some time. When [place] to the [place], was placed on Lexapro which was helpful but got into a terrible work situation and started drinking heavily again. She was placed on a different medication she doesn't remember.     Pertinent items include: SIB, hx of depression, alcohol use as a way to cope in the past, TMS procedure in 07/2025.       Interim History     Since last visit:  - Had a TMS consultation and started TMS procedures. In her second week and just got to her full level dose. The experience feels intense but the technicians are responsive to her suggestions.  -Feels hopeful that it would help with the lingering depression. She's feeling a bit more like herself. Has experienced a few headaches but it resolves with Exedrine.  -Has noticed that sleep quality feels better and  able to sleep through the night.  -Started Wegovy and has taken her second dose. Depression combination with medication led to overeating in the past but hopeful that it would help. This hasn't happened so much with Viibryd.  -Montserrat is 2+years old and Collette is feeling like the mom she always wanted to be. She feels well bonded with Montserrat.  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      Recent Substance Use  Alcohol: Hasn't had any for the past few months    Reproductive Plans: Is on Apri.  Important Summary Points & Treatment Events   24: Established care  24: Started Prozac taper. Start Wellbutrin. Continue PRN Gabapentin started by OB.  24: Pause Prozac taper due to side effects. Continue dose as 60mg.  7/10/24: Continue Prozac taper. Reduce dose to 40mg and increase Wellbutrin to 300mg.  24: Hold off on Prozac taper.  24: Reduce Prozac to 20mg for 2weeks, then stop. Start Viibryd for depression and anxiety. Encouraged to use PRN Gabapentin prescribed by OB during the day for daytime anxiety.  24: completed Prozac taper. Starting to notice efficacy on Viibryd. TMS referral sent.     Mood & Anxiety Disorder (PMAD) History   Hx of  mood or anxiety disorder: Worsened depression while pregnant. Dose of antidepressant was increased to 80mg  Hx of  psychosis: N.A  Hx premenstrual mood/anxiety problems: Experienced dips in mood leading up to her period.   Hx mood symptoms while taking hormonal birth control: On birth control in college. Doesn't remember  Hx of infertility: 5 rounds of IVF, IUI  Hx of traumatic birth: Had a traumatic pregnancy and is now just processing everything.  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 Supported by her income and 's       Children- Roxanne Sutton)       Living situation- Lives with her , daughter and pets.      Legal- None  Early history/Education- Born and raised in [place]. An only child. Struggled to have friends because of her upbringing. Did well in school and highest education level is a masters.  Social support-  for about 11 years. Recently moved from [place] . Moved because of 's job.  In-laws that live here. She finds them supportive. Most of her friends are still in [place].  Cultural influences/Impact- None       Feels safe at home- Yes  Family History-  Depression and AUD: Mother      Psychiatric Medication Trials       Drug /  Start Date Dose (mg) Helpful Taken during a  period? Adverse Effects   DC Reason / Date   Prozac  Y   Discontinued due to lack of efficacy   Paxil/ Started many years ago  Was helpful but discontinued when [place].      Lexapro  Y but wore off      Cymbalta                    Medical / Surgical History                                                                                                                     Patient Active Problem List   Diagnosis    History of gestational diabetes mellitus (GDM)    History of pre-eclampsia    Diminished ovarian reserve    Family history of malignant neoplasm of breast    Gastroesophageal reflux disease, unspecified whether esophagitis present    Major depressive disorder, recurrent episode, moderate (H)    Class 2 severe obesity due to excess calories with serious comorbidity in adult (H)    Mixed hyperlipidemia       Past Surgical History:   Procedure Laterality Date     SECTION      OTHER SURGICAL HISTORY       "Uterine polyp removal, hysteroscopy        Medical Review of Systems                                                                                       2,10   none in addition to that documented above  Allergy                                Patient has no known allergies.  Current Medications                                                                                                         Current Outpatient Medications   Medication Sig Dispense Refill    APRI 0.15-30 MG-MCG tablet Take 1 tablet by mouth daily at 2 pm. Skip week of placebo pills. 84 tablet 4    buPROPion (WELLBUTRIN XL) 300 MG 24 hr tablet Take 1 tablet (300 mg) by mouth every morning. 90 tablet 0    calcium carbonate (TUMS) 500 MG chewable tablet Take 1 chew tab by mouth daily as needed for heartburn      famotidine (PEPCID) 20 MG tablet Take 1 tablet (20 mg) by mouth 2 times daily as needed (heartburn) 60 tablet 1    gabapentin (NEURONTIN) 100 MG capsule Take 1-3 capsules (100-300 mg) by mouth nightly as needed for other (sleep). 90 capsule 0    semaglutide-weight management (WEGOVY) 0.25 MG/0.5ML pen Inject 0.5 mLs (0.25 mg) subcutaneously once a week. 2 mL 0    triamcinolone (KENALOG) 0.1 % external cream Apply topically daily as needed for irritation (Patient not taking: Reported on 6/3/2025)      vilazodone (VIIBRYD) 20 MG TABS tablet Take 1 tablet (20 mg) by mouth daily. 90 tablet 0     Vitals                                                                                             LMP  (LMP Unknown)    Mental Status Exam                                                                            9, 14 cog gs   Alertness: alert  and oriented  Appearance: well groomed  Behavior/Demeanor: cooperative, with good  eye contact   Speech: regular rate and rhythm  Language: Fluent in English  Psychomotor: normal or unremarkable  Mood: \"okay\"  Affect: appropriate; was congruent to mood; was congruent to content  Thought Process/Associations: " linear and logical  Thought Content:  Reports none;  Denies suicidal ideation and violent ideation  Perception:  Reports none;  Denies auditory hallucinations and visual hallucinations  Insight: good  Judgment: good  Cognition: (6) does  appear grossly intact; formal cognitive testing was not done  Gait and Station: N/A ([facility])     Labs and Data         7/29/2025    10:27 AM 7/30/2025     9:58 AM 7/30/2025     2:45 PM   PHQ   PHQ-9 Total Score 10 12 9    Q9: Thoughts of better off dead/self-harm past 2 weeks Not at all Not at all Not at all       Patient-reported     Answers submitted by the patient for this visit:  Women's Well-Being Follow Up on 7/30/2025  3:00 PM with Tolulope Oluwadamilola Odebunmi  Patient Health Questionnaire (Submitted on 7/30/2025)  If you checked off any problems, how difficult have these problems made it for you to do your work, take care of things at home, or get along with other people?: Somewhat difficult  PHQ9 TOTAL SCORE: 9  Patient Health Questionnaire (G7) (Submitted on 7/30/2025)  ALLI 7 TOTAL SCORE: 8          9/25/2024    11:50 AM 2/5/2025     3:20 PM 7/30/2025     2:47 PM   PROMIS-10 Total Score w/o Sub Scores   PROMIS TOTAL - SUBSCORES 32 29 27        Patient-reported         5/29/2024     8:26 AM   CAGE-AID Total Score   Total Score 2   Total Score MyChart 2 (A total score of 2 or greater is considered clinically significant)         7/29/2025    10:27 AM 7/30/2025     9:58 AM 7/30/2025     2:45 PM   PHQ-9 SCORE   PHQ-9 Total Score MyChart   9 (Mild depression)   PHQ-9 Total Score 10 12 9        Patient-reported         2/5/2025     3:19 PM 5/12/2025     2:14 PM 7/30/2025     2:46 PM   ALLI-7 SCORE   Total Score 6 (mild anxiety) 5 (mild anxiety) 8 (mild anxiety)   Total Score 6  5  8        Patient-reported       Liver/Kidney Function, TSH Metabolic Blood counts   No lab results found.  Recent Labs   Lab Test 05/26/24  1027   TSH 2.00    Recent Labs   Lab Test  06/24/25  0837   CHOL 236*   TRIG 203*   *   HDL 56     Recent Labs   Lab Test 06/24/25  0837   A1C 5.1     No lab results found. Recent Labs   Lab Test 01/22/24  0818   WBC 6.3   HGB 13.3   HCT 40.3   MCV 88              ECG N/A QTc = N/Ams      PROVIDER: Tolulope Oluwadamilola Odebunmi, MD    Level of Medical Decision Making:   - At least 2 stable chronic problems  - Engaged in prescription drug management during visit (discussed any medication benefits, side effects, alternatives, etc.)         The longitudinal plan of care for MDD was addressed during this visit. Due to the added complexity in care, I will continue to support Collette in the subsequent management of this condition(s) and with the ongoing continuity of care of this condition(s).        Psychiatry Individual Psychotherapy Note   Psychotherapy start time - 3: 10 PM  Psychotherapy end time - 3: 28 PM  Date treatment plan last reviewed with patient - 05/29/24  Subjective: This supportive psychotherapy session addressed issues related to goals of therapy and current psychosocial stressors. Patient's reaction: Action in the context of mental status appropriate for ambulatory setting.    Interactive complexity indicated? No  Plan: RTC in timeframe noted above  Psychotherapy services during this visit included myself and the patient.   Treatment Plan      SYMPTOMS; PROBLEMS   MEASURABLE GOALS;    FUNCTIONAL IMPROVEMENT / GAINS INTERVENTIONS DISCHARGE CRITERIA   Depression: depressed mood, anhedonia, and low energy    Psychosocial: TMS intervention  and other lifestyle changes to consider   reduce depressive symptoms          Reframing expectations around interventions and encouraging a holistic approach. Supportive / psychodynamic marked symptom improvement

## 2025-07-29 NOTE — PROGRESS NOTES
Interventional Psychiatry Program  [address], [address]  [place] [address]  TMS Procedure Note   Collette Boss MRN# [id number]  Age: 35 year old  YOB: 1990    Collette Boss comes into clinic today at the request of, Nolberto Rice MD, ordering provider for TMS treatments.    Pre-Procedure:  History and Physical: Reviewed in medical record  Consent signed by: Collette Boss for this course of treatment on: 07/22/2025    Clinical Narrative:  Patient tolerated treatment. Increased to 120%    Indications for TMS:  MDD, recurrent, severe; 4+ medication trials (from 2+ classes) ineffective; Psychotherapy ineffective     Procedure Diagnosis:  MDD, severe, recurrent F33.2    Treatment Hx:  Treatment number this series: 6  Total lifetime treatment number: 6    No Known Allergies   LMP  (LMP Unknown)     Pause for the Cause  Right patient: Yes  Right procedure/correct coil:  Yes; rTMS; cpt 86465; H1 coil.   Earplugs in place:  Yes    Procedure  Patient was seated in procedure chair. Identity and procedure was verified. Ear plugs were placed in ears and patient-specific cap was placed on head and tightened appropriately. Ruler locations were verified. Bone conducting headphones were not used. Coil was placed at 0 - eyebrow - 15.5 and stimulator was set to 89% (120% of MT). Initial train was well tolerated so 55 trains were delivered. Patient tolerated procedure well with minimal right eyebrow movement.    Motor Threshold Determination  Distance from nasion to inion: 37cm  MT 1: 0 - 7 - 15.5 @ 74% on 07/22/2025    Standard LDLPFC  Frequency: 18  Train Duration: 2  Total pulses delivered: 1980  Inter-train interval: 20  Tx Loc: 0 - eye - 15.5  Energy: 89% (120% MT)  Trains: 55          7/24/2025    10:02 AM 7/25/2025    10:19 AM 7/28/2025    10:15 AM   PHQ-9 SCORE   PHQ-9 Total Score 10 14 14     Date MADRS QIDS PHQ-9   07/22/25 23 13 11   7/25/25  13 14        Plan   - Continue TMS treatments      The service provided today was under the supervisory provider of the day, Nolberto Rice MD, who was available if needed.     Leticia Whitley TMS Technician  [facility]

## 2025-07-30 ENCOUNTER — OFFICE VISIT (OUTPATIENT)
Dept: PSYCHIATRY | Facility: CLINIC | Age: 35
End: 2025-07-30
Payer: COMMERCIAL

## 2025-07-30 ENCOUNTER — VIRTUAL VISIT (OUTPATIENT)
Dept: PSYCHIATRY | Facility: CLINIC | Age: 35
End: 2025-07-30
Attending: STUDENT IN AN ORGANIZED HEALTH CARE EDUCATION/TRAINING PROGRAM
Payer: COMMERCIAL

## 2025-07-30 DIAGNOSIS — F33.1 MAJOR DEPRESSIVE DISORDER, RECURRENT EPISODE, MODERATE (H): Primary | ICD-10-CM

## 2025-07-30 DIAGNOSIS — F41.1 GAD (GENERALIZED ANXIETY DISORDER): ICD-10-CM

## 2025-07-30 DIAGNOSIS — F33.0 MILD EPISODE OF RECURRENT MAJOR DEPRESSIVE DISORDER: ICD-10-CM

## 2025-07-30 PROCEDURE — 1126F AMNT PAIN NOTED NONE PRSNT: CPT | Mod: 95 | Performed by: STUDENT IN AN ORGANIZED HEALTH CARE EDUCATION/TRAINING PROGRAM

## 2025-07-30 PROCEDURE — 98006 SYNCH AUDIO-VIDEO EST MOD 30: CPT | Performed by: STUDENT IN AN ORGANIZED HEALTH CARE EDUCATION/TRAINING PROGRAM

## 2025-07-30 PROCEDURE — 90833 PSYTX W PT W E/M 30 MIN: CPT | Mod: 95 | Performed by: STUDENT IN AN ORGANIZED HEALTH CARE EDUCATION/TRAINING PROGRAM

## 2025-07-30 PROCEDURE — G2211 COMPLEX E/M VISIT ADD ON: HCPCS | Mod: 95 | Performed by: STUDENT IN AN ORGANIZED HEALTH CARE EDUCATION/TRAINING PROGRAM

## 2025-07-30 RX ORDER — VILAZODONE HYDROCHLORIDE 20 MG/1
20 TABLET ORAL DAILY
Qty: 90 TABLET | Refills: 0 | Status: SHIPPED | OUTPATIENT
Start: 2025-07-30

## 2025-07-30 ASSESSMENT — PATIENT HEALTH QUESTIONNAIRE - PHQ9
SUM OF ALL RESPONSES TO PHQ QUESTIONS 1-9: 9
SUM OF ALL RESPONSES TO PHQ QUESTIONS 1-9: 9
10. IF YOU CHECKED OFF ANY PROBLEMS, HOW DIFFICULT HAVE THESE PROBLEMS MADE IT FOR YOU TO DO YOUR WORK, TAKE CARE OF THINGS AT HOME, OR GET ALONG WITH OTHER PEOPLE: SOMEWHAT DIFFICULT
SUM OF ALL RESPONSES TO PHQ QUESTIONS 1-9: 12
10. IF YOU CHECKED OFF ANY PROBLEMS, HOW DIFFICULT HAVE THESE PROBLEMS MADE IT FOR YOU TO DO YOUR WORK, TAKE CARE OF THINGS AT HOME, OR GET ALONG WITH OTHER PEOPLE: SOMEWHAT DIFFICULT
SUM OF ALL RESPONSES TO PHQ QUESTIONS 1-9: 9
SUM OF ALL RESPONSES TO PHQ QUESTIONS 1-9: 9

## 2025-07-30 ASSESSMENT — ANXIETY QUESTIONNAIRES
5. BEING SO RESTLESS THAT IT IS HARD TO SIT STILL: SEVERAL DAYS
7. FEELING AFRAID AS IF SOMETHING AWFUL MIGHT HAPPEN: NOT AT ALL
7. FEELING AFRAID AS IF SOMETHING AWFUL MIGHT HAPPEN: NOT AT ALL
4. TROUBLE RELAXING: MORE THAN HALF THE DAYS
1. FEELING NERVOUS, ANXIOUS, OR ON EDGE: SEVERAL DAYS
IF YOU CHECKED OFF ANY PROBLEMS ON THIS QUESTIONNAIRE, HOW DIFFICULT HAVE THESE PROBLEMS MADE IT FOR YOU TO DO YOUR WORK, TAKE CARE OF THINGS AT HOME, OR GET ALONG WITH OTHER PEOPLE: SOMEWHAT DIFFICULT
GAD7 TOTAL SCORE: 8
6. BECOMING EASILY ANNOYED OR IRRITABLE: MORE THAN HALF THE DAYS
8. IF YOU CHECKED OFF ANY PROBLEMS, HOW DIFFICULT HAVE THESE MADE IT FOR YOU TO DO YOUR WORK, TAKE CARE OF THINGS AT HOME, OR GET ALONG WITH OTHER PEOPLE?: SOMEWHAT DIFFICULT
GAD7 TOTAL SCORE: 8
3. WORRYING TOO MUCH ABOUT DIFFERENT THINGS: SEVERAL DAYS
GAD7 TOTAL SCORE: 8
2. NOT BEING ABLE TO STOP OR CONTROL WORRYING: SEVERAL DAYS

## 2025-07-30 ASSESSMENT — PAIN SCALES - GENERAL: PAINLEVEL_OUTOF10: NO PAIN (0)

## 2025-07-30 NOTE — PROGRESS NOTES
Interventional Psychiatry Program  [address] [facility], [address]  [place] [address]  TMS Procedure Note   Collette Boss MRN# [id number]  Age: 35 year old  YOB: 1990    Collette Boss comes into clinic today at the request of, Nolberto Rice MD, ordering provider for TMS treatments.    Pre-Procedure:  History and Physical: Reviewed in medical record  Consent signed by: Collette Boss for this course of treatment on: 07/22/2025    Clinical Narrative:  Patient tolerated treatment. Ran pt at 100%. Did not get good sleep and was having trouble tolerating treatment.     Indications for TMS:  MDD, recurrent, severe; 4+ medication trials (from 2+ classes) ineffective; Psychotherapy ineffective     Procedure Diagnosis:  MDD, severe, recurrent F33.2    Treatment Hx:  Treatment number this series: 7  Total lifetime treatment number: 7    No Known Allergies   LMP  (LMP Unknown)     Pause for the Cause  Right patient: Yes  Right procedure/correct coil:  Yes; rTMS; cpt 06696; H1 coil.   Earplugs in place:  Yes    Procedure  Patient was seated in procedure chair. Identity and procedure was verified. Ear plugs were placed in ears and patient-specific cap was placed on head and tightened appropriately. Ruler locations were verified. Bone conducting headphones were not used. Coil was placed at 0 - eyebrow - 15.5 and stimulator was set to 74% (100% of MT). Initial train was well tolerated so 55 trains were delivered. Patient tolerated procedure well with minimal right eyebrow movement.    Motor Threshold Determination  Distance from nasion to inion: 37cm  MT 1: 0 - 7 - 15.5 @ 74% on 07/22/2025    Standard LDLPFC  Frequency: 18  Train Duration: 2  Total pulses delivered: 1980  Inter-train interval: 20  Tx Loc: 0 - eye - 15.5  Energy: 74% (100% MT)  Trains: 55          7/28/2025    10:15 AM 7/29/2025    10:27 AM 7/30/2025     9:58 AM   PHQ-9 SCORE   PHQ-9 Total Score 14 10 12     Date MADRS QIDS PHQ-9   07/22/25 23  13 11   7/25/25  13 14        Plan   - Continue TMS treatments     The service provided today was under the supervisory provider of the day, Carlos Angulo MD, who was available if needed.     Rosalinda Faria TMS Technician  [facility]

## 2025-07-30 NOTE — PATIENT INSTRUCTIONS
**For crisis resources, please see the information at the end of this document**   Patient Education    Thank you for coming to the [facility] Mobile CLINIC.     Lab Testing:  If you had lab testing today and your results are reassuring or normal they will be mailed to you or sent through [employer] within 7 days. If the lab tests need quick action we will call you with the results. The phone number we will call with results is # [phone]. If this is not the best number please call our clinic and change the number.     Medication Refills:  If you need any refills please call your pharmacy and they will contact us. Our fax number for refills is [phone].   Three business days of notice are needed for general medication refill requests.   Five business days of notice are needed for controlled substance refill requests.   If you need to change to a different pharmacy, please contact the new pharmacy directly. The new pharmacy will help you get your medications transferred.     Contact Us:  Please call [phone] during business hours (8-5:00 M-F).   If you have medication related questions after clinic hours, or on the weekend, please call [phone].     Financial Assistance [phone]   Medical Records [phone]       MENTAL HEALTH CRISIS RESOURCES:  For a emergency help, please call 911 or go to the nearest Emergency Department.     Emergency Walk-In Options:   [facility] Unit @ [facility] ([place]): [phone] - Specialized mental health emergency area designed to be calming  [facility] ([place]): [phone]  [facility] ([place]): [phone]  [facility]): [phone]    [facility] Crisis Information:   [place]: [phone]  [place]: [phone]  [place] ([place]) - Adult: [phone]     Child: [phone]  [place] - Adult: [phone]     Child: [phone]  [place]:  [phone]  List of all [facility] resources:   https://[place].gov/dhs/people-we-serve/adults/health-care/mental-health/resources/crisis-contacts.jsp    National Crisis Information:   Crisis Text Line: Text  [place]  to 153740  Suicide & Crisis Lifeline: 988  National Suicide Prevention Lifeline: 0-148-628-TALK (1-937.248.7887)       For online chat options, visit https://suicidepreventionlifeline.org/chat/  Poison Control Center: 1-155-909-5802  Trans Lifeline: 6-589-891-5654 - Hotline for transgender people of all ages  [employer]: 6-955-722-1533 - Hotline for LGBT youth     For Non-Emergency Support:   Fast Tracker: Mental Health & Substance Use Disorder Resources -   https://www.[employer].org/

## 2025-07-30 NOTE — NURSING NOTE
Current patient location: [address] [place] [address]    Is the patient currently in the state of [place]? YES    Visit mode: VIDEO    If the visit is dropped, the patient can be reconnected by:VIDEO VISIT: Text to cell phone:   Telephone Information:   Mobile [phone]       Will anyone else be joining the visit? NO  (If patient encounters technical issues they should call [phone]    Are changes needed to the allergy or medication list? No    Are refills needed on medications prescribed by this physician? NO    Rooming Documentation:  Questionnaire(s) completed    Reason for visit: MANINDER QUINNF

## 2025-07-31 ENCOUNTER — OFFICE VISIT (OUTPATIENT)
Dept: PSYCHIATRY | Facility: CLINIC | Age: 35
End: 2025-07-31
Payer: COMMERCIAL

## 2025-07-31 DIAGNOSIS — F33.1 MAJOR DEPRESSIVE DISORDER, RECURRENT EPISODE, MODERATE (H): Primary | ICD-10-CM

## 2025-07-31 ASSESSMENT — PATIENT HEALTH QUESTIONNAIRE - PHQ9: SUM OF ALL RESPONSES TO PHQ QUESTIONS 1-9: 9

## 2025-07-31 NOTE — PROGRESS NOTES
Interventional Psychiatry Program  [address] [facility], [address]  [place] [address]  TMS Procedure Note   Collette Boss MRN# [id number]  Age: 35 year old  YOB: 1990    Collette Boss comes into clinic today at the request of, Nolberto Rice MD, ordering provider for TMS treatments.    Pre-Procedure:  History and Physical: Reviewed in medical record  Consent signed by: Collette Boss for this course of treatment on: 07/22/2025    Clinical Narrative:  Patient tolerated treatment. Daughter is off from  next week.     Indications for TMS:  MDD, recurrent, severe; 4+ medication trials (from 2+ classes) ineffective; Psychotherapy ineffective     Procedure Diagnosis:  MDD, severe, recurrent F33.2    Treatment Hx:  Treatment number this series: 8  Total lifetime treatment number: 8    No Known Allergies   LMP  (LMP Unknown)     Pause for the Cause  Right patient: Yes  Right procedure/correct coil:  Yes; rTMS; cpt 90730; H1 coil.   Earplugs in place:  Yes    Procedure  Patient was seated in procedure chair. Identity and procedure was verified. Ear plugs were placed in ears and patient-specific cap was placed on head and tightened appropriately. Ruler locations were verified. Bone conducting headphones were not used. Coil was placed at 0 - eyebrow - 15.5 and stimulator was set to 89% (120% of MT). Initial train was well tolerated so 55 trains were delivered. Patient tolerated procedure well with minimal right eyebrow movement.    Motor Threshold Determination  Distance from nasion to inion: 37cm  MT 1: 0 - 7 - 15.5 @ 74% on 07/22/2025    Standard LDLPFC  Frequency: 18  Train Duration: 2  Total pulses delivered: 1980  Inter-train interval: 20  Tx Loc: 0 - eye - 15.5  Energy: 89% (120% MT)  Trains: 55          7/29/2025    10:27 AM 7/30/2025     9:58 AM 7/30/2025     2:45 PM   PHQ-9 SCORE   PHQ-9 Total Score MyChart   9 (Mild depression)   PHQ-9 Total Score 10 12 9        Patient-reported     Date  MADRS QIDS PHQ-9   07/22/25 23 13 11   7/25/25  13 14        Plan   - Continue TMS treatments     The service provided today was under the supervisory provider of the day, PERI Nieto MD, who was available if needed.     Norma Paiz TMS Technician  [facility]

## 2025-08-04 ENCOUNTER — OFFICE VISIT (OUTPATIENT)
Dept: PSYCHIATRY | Facility: CLINIC | Age: 35
End: 2025-08-04
Payer: COMMERCIAL

## 2025-08-04 DIAGNOSIS — Z86.32 HISTORY OF GESTATIONAL DIABETES MELLITUS (GDM): ICD-10-CM

## 2025-08-04 DIAGNOSIS — E66.812 CLASS 2 SEVERE OBESITY DUE TO EXCESS CALORIES WITH SERIOUS COMORBIDITY AND BODY MASS INDEX (BMI) OF 35.0 TO 35.9 IN ADULT (H): Primary | ICD-10-CM

## 2025-08-04 DIAGNOSIS — F33.1 MAJOR DEPRESSIVE DISORDER, RECURRENT EPISODE, MODERATE (H): Primary | ICD-10-CM

## 2025-08-04 DIAGNOSIS — K21.9 GASTROESOPHAGEAL REFLUX DISEASE, UNSPECIFIED WHETHER ESOPHAGITIS PRESENT: ICD-10-CM

## 2025-08-04 DIAGNOSIS — Z87.59 HISTORY OF PRE-ECLAMPSIA: ICD-10-CM

## 2025-08-04 DIAGNOSIS — E78.2 MIXED HYPERLIPIDEMIA: ICD-10-CM

## 2025-08-04 DIAGNOSIS — E66.01 CLASS 2 SEVERE OBESITY DUE TO EXCESS CALORIES WITH SERIOUS COMORBIDITY AND BODY MASS INDEX (BMI) OF 35.0 TO 35.9 IN ADULT (H): Primary | ICD-10-CM

## 2025-08-04 ASSESSMENT — PATIENT HEALTH QUESTIONNAIRE - PHQ9: SUM OF ALL RESPONSES TO PHQ QUESTIONS 1-9: 7

## 2025-08-05 ENCOUNTER — OFFICE VISIT (OUTPATIENT)
Dept: PSYCHIATRY | Facility: CLINIC | Age: 35
End: 2025-08-05
Payer: COMMERCIAL

## 2025-08-05 DIAGNOSIS — F33.1 MAJOR DEPRESSIVE DISORDER, RECURRENT EPISODE, MODERATE (H): Primary | ICD-10-CM

## 2025-08-05 ASSESSMENT — PATIENT HEALTH QUESTIONNAIRE - PHQ9: SUM OF ALL RESPONSES TO PHQ QUESTIONS 1-9: 12

## 2025-08-06 ENCOUNTER — OFFICE VISIT (OUTPATIENT)
Dept: PSYCHIATRY | Facility: CLINIC | Age: 35
End: 2025-08-06
Payer: COMMERCIAL

## 2025-08-06 DIAGNOSIS — F33.1 MAJOR DEPRESSIVE DISORDER, RECURRENT EPISODE, MODERATE (H): Primary | ICD-10-CM

## 2025-08-06 ASSESSMENT — PATIENT HEALTH QUESTIONNAIRE - PHQ9: SUM OF ALL RESPONSES TO PHQ QUESTIONS 1-9: 9

## 2025-08-07 ENCOUNTER — OFFICE VISIT (OUTPATIENT)
Dept: PSYCHIATRY | Facility: CLINIC | Age: 35
End: 2025-08-07
Payer: COMMERCIAL

## 2025-08-07 DIAGNOSIS — F33.1 MAJOR DEPRESSIVE DISORDER, RECURRENT EPISODE, MODERATE (H): Primary | ICD-10-CM

## 2025-08-18 ENCOUNTER — OFFICE VISIT (OUTPATIENT)
Dept: PSYCHIATRY | Facility: CLINIC | Age: 35
End: 2025-08-18
Payer: COMMERCIAL

## 2025-08-18 DIAGNOSIS — F33.1 MAJOR DEPRESSIVE DISORDER, RECURRENT EPISODE, MODERATE (H): Primary | ICD-10-CM

## 2025-08-18 ASSESSMENT — PATIENT HEALTH QUESTIONNAIRE - PHQ9
SUM OF ALL RESPONSES TO PHQ QUESTIONS 1-9: 9
10. IF YOU CHECKED OFF ANY PROBLEMS, HOW DIFFICULT HAVE THESE PROBLEMS MADE IT FOR YOU TO DO YOUR WORK, TAKE CARE OF THINGS AT HOME, OR GET ALONG WITH OTHER PEOPLE: SOMEWHAT DIFFICULT
SUM OF ALL RESPONSES TO PHQ QUESTIONS 1-9: 9
10. IF YOU CHECKED OFF ANY PROBLEMS, HOW DIFFICULT HAVE THESE PROBLEMS MADE IT FOR YOU TO DO YOUR WORK, TAKE CARE OF THINGS AT HOME, OR GET ALONG WITH OTHER PEOPLE: SOMEWHAT DIFFICULT
SUM OF ALL RESPONSES TO PHQ QUESTIONS 1-9: 9
10. IF YOU CHECKED OFF ANY PROBLEMS, HOW DIFFICULT HAVE THESE PROBLEMS MADE IT FOR YOU TO DO YOUR WORK, TAKE CARE OF THINGS AT HOME, OR GET ALONG WITH OTHER PEOPLE: SOMEWHAT DIFFICULT
SUM OF ALL RESPONSES TO PHQ QUESTIONS 1-9: 9

## 2025-08-19 ENCOUNTER — OFFICE VISIT (OUTPATIENT)
Dept: PSYCHIATRY | Facility: CLINIC | Age: 35
End: 2025-08-19
Payer: COMMERCIAL

## 2025-08-19 ENCOUNTER — ALLIED HEALTH/NURSE VISIT (OUTPATIENT)
Dept: PSYCHIATRY | Facility: CLINIC | Age: 35
End: 2025-08-19
Payer: COMMERCIAL

## 2025-08-19 DIAGNOSIS — F33.1 MAJOR DEPRESSIVE DISORDER, RECURRENT EPISODE, MODERATE (H): Primary | ICD-10-CM

## 2025-08-19 ASSESSMENT — ANXIETY QUESTIONNAIRES
GAD7 TOTAL SCORE: 10
2. NOT BEING ABLE TO STOP OR CONTROL WORRYING: MORE THAN HALF THE DAYS
GAD7 TOTAL SCORE: 10
3. WORRYING TOO MUCH ABOUT DIFFERENT THINGS: SEVERAL DAYS
4. TROUBLE RELAXING: MORE THAN HALF THE DAYS
IF YOU CHECKED OFF ANY PROBLEMS ON THIS QUESTIONNAIRE, HOW DIFFICULT HAVE THESE PROBLEMS MADE IT FOR YOU TO DO YOUR WORK, TAKE CARE OF THINGS AT HOME, OR GET ALONG WITH OTHER PEOPLE: SOMEWHAT DIFFICULT
7. FEELING AFRAID AS IF SOMETHING AWFUL MIGHT HAPPEN: SEVERAL DAYS
7. FEELING AFRAID AS IF SOMETHING AWFUL MIGHT HAPPEN: SEVERAL DAYS
8. IF YOU CHECKED OFF ANY PROBLEMS, HOW DIFFICULT HAVE THESE MADE IT FOR YOU TO DO YOUR WORK, TAKE CARE OF THINGS AT HOME, OR GET ALONG WITH OTHER PEOPLE?: SOMEWHAT DIFFICULT
5. BEING SO RESTLESS THAT IT IS HARD TO SIT STILL: SEVERAL DAYS
1. FEELING NERVOUS, ANXIOUS, OR ON EDGE: MORE THAN HALF THE DAYS
GAD7 TOTAL SCORE: 10
6. BECOMING EASILY ANNOYED OR IRRITABLE: SEVERAL DAYS

## 2025-08-20 ENCOUNTER — OFFICE VISIT (OUTPATIENT)
Dept: PSYCHIATRY | Facility: CLINIC | Age: 35
End: 2025-08-20
Payer: COMMERCIAL

## 2025-08-20 DIAGNOSIS — F33.1 MAJOR DEPRESSIVE DISORDER, RECURRENT EPISODE, MODERATE (H): Primary | ICD-10-CM

## 2025-08-20 ASSESSMENT — PATIENT HEALTH QUESTIONNAIRE - PHQ9
SUM OF ALL RESPONSES TO PHQ QUESTIONS 1-9: 7
SUM OF ALL RESPONSES TO PHQ QUESTIONS 1-9: 7
10. IF YOU CHECKED OFF ANY PROBLEMS, HOW DIFFICULT HAVE THESE PROBLEMS MADE IT FOR YOU TO DO YOUR WORK, TAKE CARE OF THINGS AT HOME, OR GET ALONG WITH OTHER PEOPLE: SOMEWHAT DIFFICULT
SUM OF ALL RESPONSES TO PHQ QUESTIONS 1-9: 7
10. IF YOU CHECKED OFF ANY PROBLEMS, HOW DIFFICULT HAVE THESE PROBLEMS MADE IT FOR YOU TO DO YOUR WORK, TAKE CARE OF THINGS AT HOME, OR GET ALONG WITH OTHER PEOPLE: SOMEWHAT DIFFICULT
SUM OF ALL RESPONSES TO PHQ QUESTIONS 1-9: 7

## 2025-08-21 ENCOUNTER — OFFICE VISIT (OUTPATIENT)
Dept: PSYCHIATRY | Facility: CLINIC | Age: 35
End: 2025-08-21
Payer: COMMERCIAL

## 2025-08-21 DIAGNOSIS — F33.1 MAJOR DEPRESSIVE DISORDER, RECURRENT EPISODE, MODERATE (H): Primary | ICD-10-CM

## 2025-08-25 ENCOUNTER — OFFICE VISIT (OUTPATIENT)
Dept: PSYCHIATRY | Facility: CLINIC | Age: 35
End: 2025-08-25
Payer: COMMERCIAL

## 2025-08-25 DIAGNOSIS — F33.1 MAJOR DEPRESSIVE DISORDER, RECURRENT EPISODE, MODERATE (H): Primary | ICD-10-CM

## 2025-08-25 ASSESSMENT — PATIENT HEALTH QUESTIONNAIRE - PHQ9
10. IF YOU CHECKED OFF ANY PROBLEMS, HOW DIFFICULT HAVE THESE PROBLEMS MADE IT FOR YOU TO DO YOUR WORK, TAKE CARE OF THINGS AT HOME, OR GET ALONG WITH OTHER PEOPLE: SOMEWHAT DIFFICULT
SUM OF ALL RESPONSES TO PHQ QUESTIONS 1-9: 5
10. IF YOU CHECKED OFF ANY PROBLEMS, HOW DIFFICULT HAVE THESE PROBLEMS MADE IT FOR YOU TO DO YOUR WORK, TAKE CARE OF THINGS AT HOME, OR GET ALONG WITH OTHER PEOPLE: SOMEWHAT DIFFICULT
SUM OF ALL RESPONSES TO PHQ QUESTIONS 1-9: 5

## 2025-08-26 ENCOUNTER — OFFICE VISIT (OUTPATIENT)
Dept: PSYCHIATRY | Facility: CLINIC | Age: 35
End: 2025-08-26
Payer: COMMERCIAL

## 2025-08-26 DIAGNOSIS — F33.1 MAJOR DEPRESSIVE DISORDER, RECURRENT EPISODE, MODERATE (H): Primary | ICD-10-CM

## 2025-08-27 ENCOUNTER — OFFICE VISIT (OUTPATIENT)
Dept: PSYCHIATRY | Facility: CLINIC | Age: 35
End: 2025-08-27
Payer: COMMERCIAL

## 2025-08-27 DIAGNOSIS — F33.1 MAJOR DEPRESSIVE DISORDER, RECURRENT EPISODE, MODERATE (H): Primary | ICD-10-CM

## 2025-08-27 ASSESSMENT — PATIENT HEALTH QUESTIONNAIRE - PHQ9
SUM OF ALL RESPONSES TO PHQ QUESTIONS 1-9: 5
SUM OF ALL RESPONSES TO PHQ QUESTIONS 1-9: 5
10. IF YOU CHECKED OFF ANY PROBLEMS, HOW DIFFICULT HAVE THESE PROBLEMS MADE IT FOR YOU TO DO YOUR WORK, TAKE CARE OF THINGS AT HOME, OR GET ALONG WITH OTHER PEOPLE: SOMEWHAT DIFFICULT
10. IF YOU CHECKED OFF ANY PROBLEMS, HOW DIFFICULT HAVE THESE PROBLEMS MADE IT FOR YOU TO DO YOUR WORK, TAKE CARE OF THINGS AT HOME, OR GET ALONG WITH OTHER PEOPLE: SOMEWHAT DIFFICULT
SUM OF ALL RESPONSES TO PHQ QUESTIONS 1-9: 5
SUM OF ALL RESPONSES TO PHQ QUESTIONS 1-9: 5

## 2025-08-28 ENCOUNTER — OFFICE VISIT (OUTPATIENT)
Dept: PSYCHIATRY | Facility: CLINIC | Age: 35
End: 2025-08-28
Payer: COMMERCIAL

## 2025-08-28 DIAGNOSIS — F33.1 MAJOR DEPRESSIVE DISORDER, RECURRENT EPISODE, MODERATE (H): Primary | ICD-10-CM

## 2025-09-02 ENCOUNTER — OFFICE VISIT (OUTPATIENT)
Dept: PSYCHIATRY | Facility: CLINIC | Age: 35
End: 2025-09-02
Payer: COMMERCIAL

## 2025-09-02 DIAGNOSIS — F33.1 MAJOR DEPRESSIVE DISORDER, RECURRENT EPISODE, MODERATE (H): Primary | ICD-10-CM

## 2025-09-02 ASSESSMENT — PATIENT HEALTH QUESTIONNAIRE - PHQ9
10. IF YOU CHECKED OFF ANY PROBLEMS, HOW DIFFICULT HAVE THESE PROBLEMS MADE IT FOR YOU TO DO YOUR WORK, TAKE CARE OF THINGS AT HOME, OR GET ALONG WITH OTHER PEOPLE: SOMEWHAT DIFFICULT
10. IF YOU CHECKED OFF ANY PROBLEMS, HOW DIFFICULT HAVE THESE PROBLEMS MADE IT FOR YOU TO DO YOUR WORK, TAKE CARE OF THINGS AT HOME, OR GET ALONG WITH OTHER PEOPLE: SOMEWHAT DIFFICULT
SUM OF ALL RESPONSES TO PHQ QUESTIONS 1-9: 6

## 2025-09-03 ENCOUNTER — OFFICE VISIT (OUTPATIENT)
Dept: PSYCHIATRY | Facility: CLINIC | Age: 35
End: 2025-09-03
Payer: COMMERCIAL

## 2025-09-03 DIAGNOSIS — F33.1 MAJOR DEPRESSIVE DISORDER, RECURRENT EPISODE, MODERATE (H): Primary | ICD-10-CM

## 2025-09-04 ENCOUNTER — OFFICE VISIT (OUTPATIENT)
Dept: PSYCHIATRY | Facility: CLINIC | Age: 35
End: 2025-09-04
Payer: COMMERCIAL

## 2025-09-04 DIAGNOSIS — F33.1 MAJOR DEPRESSIVE DISORDER, RECURRENT EPISODE, MODERATE (H): Primary | ICD-10-CM

## 2025-09-04 ASSESSMENT — PATIENT HEALTH QUESTIONNAIRE - PHQ9
10. IF YOU CHECKED OFF ANY PROBLEMS, HOW DIFFICULT HAVE THESE PROBLEMS MADE IT FOR YOU TO DO YOUR WORK, TAKE CARE OF THINGS AT HOME, OR GET ALONG WITH OTHER PEOPLE: SOMEWHAT DIFFICULT
SUM OF ALL RESPONSES TO PHQ QUESTIONS 1-9: 2
SUM OF ALL RESPONSES TO PHQ QUESTIONS 1-9: 2